# Patient Record
Sex: MALE | Race: WHITE | NOT HISPANIC OR LATINO | Employment: FULL TIME | URBAN - METROPOLITAN AREA
[De-identification: names, ages, dates, MRNs, and addresses within clinical notes are randomized per-mention and may not be internally consistent; named-entity substitution may affect disease eponyms.]

---

## 2017-01-11 ENCOUNTER — OFFICE VISIT (OUTPATIENT)
Dept: FAMILY MEDICINE | Facility: CLINIC | Age: 57
End: 2017-01-11
Payer: COMMERCIAL

## 2017-01-11 VITALS
HEIGHT: 72 IN | SYSTOLIC BLOOD PRESSURE: 141 MMHG | HEART RATE: 100 BPM | TEMPERATURE: 98.2 F | BODY MASS INDEX: 31.56 KG/M2 | OXYGEN SATURATION: 95 % | DIASTOLIC BLOOD PRESSURE: 86 MMHG | WEIGHT: 233 LBS

## 2017-01-11 DIAGNOSIS — I10 HYPERTENSION GOAL BP (BLOOD PRESSURE) < 140/90: ICD-10-CM

## 2017-01-11 DIAGNOSIS — E66.811 OBESITY (BMI 30.0-34.9): ICD-10-CM

## 2017-01-11 DIAGNOSIS — J20.9 ACUTE BRONCHITIS, UNSPECIFIED ORGANISM: Primary | ICD-10-CM

## 2017-01-11 DIAGNOSIS — E11.9 TYPE 2 DIABETES MELLITUS WITHOUT COMPLICATION, WITHOUT LONG-TERM CURRENT USE OF INSULIN (H): ICD-10-CM

## 2017-01-11 DIAGNOSIS — M79.672 LEFT FOOT PAIN: ICD-10-CM

## 2017-01-11 PROCEDURE — 99214 OFFICE O/P EST MOD 30 MIN: CPT | Performed by: FAMILY MEDICINE

## 2017-01-11 RX ORDER — DOXYCYCLINE 100 MG/1
100 CAPSULE ORAL 2 TIMES DAILY
Qty: 20 CAPSULE | Refills: 0 | Status: SHIPPED | OUTPATIENT
Start: 2017-01-11 | End: 2017-04-14

## 2017-01-11 ASSESSMENT — PAIN SCALES - GENERAL: PAINLEVEL: EXTREME PAIN (8)

## 2017-01-11 NOTE — MR AVS SNAPSHOT
After Visit Summary   1/11/2017    Zachary Chand    MRN: [id number]           Patient Information     Date Of Birth          1960        Visit Information        Provider Department      1/11/2017 12:20 PM Terry Emanuel MD [facility]        Today's Diagnoses     Acute bronchitis, unspecified organism    -  1     Left foot pain            Follow-ups after your visit        Additional Services     PODIATRY/FOOT & ANKLE SURGERY REFERRAL       Your provider has referred you to: [facility]: [facility] [phone]   [email or web address]/    Please be aware that coverage of these services is subject to the terms and limitations of your health insurance plan.  Call member services at your health plan with any benefit or coverage questions.      Please bring the following to your appointment:  >>   Any x-rays, CTs or MRIs which have been performed.  Contact the facility where they were done to arrange for  prior to your scheduled appointment.    >>   List of current medications   >>   This referral request   >>   Any documents/labs given to you for this referral                  Follow-up notes from your care team     Return if symptoms worsen or fail to improve.      Who to contact     If you have questions or need follow up information about today's clinic visit or your schedule please contact [facility] directly at [phone].  Normal or non-critical lab and imaging results will be communicated to you by MyChart, letter or phone within 4 business days after the clinic has received the results. If you do not hear from us within 7 days, please contact the clinic through MyChart or phone. If you have a critical or abnormal lab result, we will notify you by phone as soon as possible.  Submit refill requests through [employer] or call your pharmacy and they will forward the refill request to  "us. Please allow 3 business days for your refill to be completed.          Additional Information About Your Visit        [email or web address] Information     [employer] lets you send messages to your doctor, view your test results, renew your prescriptions, schedule appointments and more. To sign up, go to [email or web address] . Click on \"Log in\" on the left side of the screen, which will take you to the Welcome page. Then click on \"Sign up Now\" on the right side of the page.     You will be asked to enter the access code listed below, as well as some personal information. Please follow the directions to create your username and password.     Your access code is: [email or web address]  Expires: 2017 10:34 AM     Your access code will  in 90 days. If you need help or a new code, please call your [place] clinic or [phone].        Care EveryWhere ID     This is your [facility] EveryWhere ID. This could be used by other organizations to access your [place] medical records  [id number]        Your Vitals Were     Pulse Temperature Height BMI (Body Mass Index) Pulse Oximetry       100 98.2  F (36.8  C) (Oral) 5' 11.75\" (1.822 m) 31.84 kg/m2 95%        Blood Pressure from Last 3 Encounters:   17 147/84   16 152/97   16 144/88    Weight from Last 3 Encounters:   17 233 lb (105.688 kg)   16 232 lb 3.2 oz (105.325 kg)   16 235 lb (106.595 kg)              We Performed the Following     PODIATRY/FOOT & ANKLE SURGERY REFERRAL          Today's Medication Changes          These changes are accurate as of: 17  1:04 PM.  If you have any questions, ask your nurse or doctor.               Start taking these medicines.        Dose/Directions    doxycycline 100 MG capsule   Commonly known as:  VIBRAMYCIN   Used for:  Acute bronchitis, unspecified organism   Started by:  Terry Emanuel MD        Dose:  100 mg   Take 1 capsule (100 mg) by mouth 2 times daily   Quantity:  20 capsule   Refills:  0       "      Where to get your medicines      These medications were sent to [facility] - [place], [place] - [address]  [address], [facility] [address]     Phone:  [phone]    - doxycycline 100 MG capsule             Primary Care Provider Office Phone # Fax #    Fernando Sutton [phone]       [facility] [address]  [facility] [address]        Thank you!     Thank you for choosing [facility]  for your care. Our goal is always to provide you with excellent care. Hearing back from our patients is one way we can continue to improve our services. Please take a few minutes to complete the written survey that you may receive in the mail after your visit with us. Thank you!             Your Updated Medication List - Protect others around you: Learn how to safely use, store and throw away your medicines at www.disposemymeds.org.          This list is accurate as of: 1/11/17  1:04 PM.  Always use your most recent med list.                   Brand Name Dispense Instructions for use    ACE NOT PRESCRIBED (INTENTIONAL)     0 each    1 each continuous prn. ACE Inhibitor not prescribed due to Other:questionable SE. Will try again after DM stable.       aspirin 325 MG buffered tablet      Take  by mouth daily.       blood glucose monitoring meter device kit    no brand specified    1 kit    Use to test blood sugar 1 times daily or as directed.  Also refills on strips and lancets, refills for a year       doxycycline 100 MG capsule    VIBRAMYCIN    20 capsule    Take 1 capsule (100 mg) by mouth 2 times daily       EPINEPHrine 0.3 MG/0.3ML injection    EPIPEN 2-FAITH    1 each    Inject 0.3 mLs (0.3 mg) into the muscle once as needed for anaphylaxis       gabapentin 100 MG capsule    NEURONTIN    90 capsule    Take 1 capsule (100 mg) by mouth 3 times daily       glipiZIDE 5 MG tablet    GLUCOTROL    180 tablet     Take 1 tablet (5 mg) by mouth 2 times daily (before meals)       ibuprofen 200 MG tablet    ADVIL/MOTRIN     Take 200 mg by mouth every 4 hours as needed. As needed       indomethacin 50 MG capsule    INDOCIN    30 capsule    Take 1 capsule (50 mg) by mouth 2 times daily (with meals)       lisinopril 10 MG tablet    PRINIVIL/ZESTRIL    30 tablet    Take 1 tablet (10 mg) by mouth daily       metFORMIN 1000 MG tablet    GLUCOPHAGE    180 tablet    Take 1 tablet (1,000 mg) by mouth 2 times daily (with meals)       nicotine 21 MG/24HR 24 hr patch    NICODERM CQ    30 patch    Place 1 patch onto the skin every 24 hours       sildenafil 100 MG cap/tab    VIAGRA    6 tablet    Take 1 tablet (100 mg) by mouth daily as needed for erectile dysfunction

## 2017-01-11 NOTE — NURSING NOTE
"Chief Complaint   Patient presents with     URI     x 4 weeks, cough, fever for 4 days     Ankle/Foot left     Was hunting in November and moving a tree with a tree and strap. Chunk of wood hit the side of his foot. Feels like he is walking on a roll of dimes on the instep of his foot. Was seen on 11/18/16     Health Maintenance     Flu shot declined, FIT       Initial /84 mmHg  Pulse 100  Temp(Src) 98.2  F (36.8  C) (Oral)  Ht 5' 11.75\" (1.822 m)  Wt 233 lb (105.688 kg)  BMI 31.84 kg/m2  SpO2 95% Estimated body mass index is 31.84 kg/(m^2) as calculated from the following:    Height as of this encounter: 5' 11.75\" (1.822 m).    Weight as of this encounter: 233 lb (105.688 kg).  BP completed using cuff size: large right arm  Suzy Beebe CMA       "

## 2017-01-11 NOTE — PROGRESS NOTES
SUBJECTIVE:                                                    Zachary Chand is a 56 year old male who presents to clinic today for the following health issues:      Acute Illness   Acute illness concerns: cough, fever, chest congestion. Was in bed for 3-4 days  Onset: 4 weeks     Fever: no    Chills/Sweats: YES- Had it    Headache (location?): no    Sinus Pressure:no    Conjunctivitis:  no    Ear Pain: no    Rhinorrhea: YES- Little bit    Congestion: YES    Sore Throat: no     Cough: YES-productive of green sputum    Wheeze: no    Decreased Appetite: YES- Little bit    Nausea: no    Vomiting: no    Diarrhea:  no    Dysuria/Freq.: no    Fatigue/Achiness: YES    Sick/Strep Exposure: YES- girlfriend     Therapies Tried and outcome: OTC cold medicines    He is a smoker. He smokes about 1 pack per day.    Left foot      Duration: 2 months    Description (location/character/radiation): Left foot, Was hunting in November and moving a tree with a truck and strap. Chunk of wood hit the side of his foot. Feels like he is walking on a roll of dimes on the instep of his foot. Hurts to walk. Was seen on 11/18/16    Intensity:  severe    Accompanying signs and symptoms:     History (similar episodes/previous evaluation): Yes    Precipitating or alleviating factors: None    Therapies tried and outcome: None       He had an x-ray done in mid November which is negative for any fracture. He's continued to have ongoing discomfort, primarily in the mid arch of his foot.    He does have underlying diabetes. He is on baseline medications as below. He is not taking anything specifically for blood pressure now.    Problem list and histories reviewed & adjusted, as indicated.  Additional history: as documented    Patient Active Problem List   Diagnosis     Chronic daily headache     Venous stasis ulcers (H)     Type 2 diabetes, HbA1c goal < 7% (H)     Hyperlipidemia LDL goal <100     Hypertension goal BP (blood pressure) < 140/90      Venous insufficiency     Bee sting allergy     Contusion of bone     Varicose veins     Tobacco abuse     Past Surgical History   Procedure Laterality Date     Hernia repair  1997     bilateral groin     Orthopedic surgery  1977     right shoulder dislocation       Social History   Substance Use Topics     Smoking status: Current Every Day Smoker -- 1.00 packs/day     Types: Cigarettes     Smokeless tobacco: Never Used     Alcohol Use: Yes      Comment: 1-5 daily     Family History   Problem Relation Age of Onset     Alcohol/Drug Father          Current Outpatient Prescriptions   Medication Sig Dispense Refill            metFORMIN (GLUCOPHAGE) 1000 MG tablet Take 1 tablet (1,000 mg) by mouth 2 times daily (with meals) 180 tablet 1     glipiZIDE (GLUCOTROL) 5 MG tablet Take 1 tablet (5 mg) by mouth 2 times daily (before meals) 180 tablet 1     blood glucose monitoring (NO BRAND SPECIFIED) meter device kit Use to test blood sugar 1 times daily or as directed.    Also refills on strips and lancets, refills for a year 1 kit 11     EPINEPHrine (EPIPEN 2-FAITH) 0.3 MG/0.3ML injection Inject 0.3 mLs (0.3 mg) into the muscle once as needed for anaphylaxis 1 each 1     ACE NOT PRESCRIBED, INTENTIONAL, 1 each continuous prn. ACE Inhibitor not prescribed due to Other:questionable SE. Will try again after DM stable. 0 each 0     ibuprofen (ADVIL,MOTRIN) 200 MG tablet Take 200 mg by mouth every 4 hours as needed. As needed       indomethacin (INDOCIN) 50 MG capsule Take 1 capsule (50 mg) by mouth 2 times daily (with meals) 30 capsule 0     lisinopril (PRINIVIL,ZESTRIL) 10 MG tablet Take 1 tablet (10 mg) by mouth daily 30 tablet 2     gabapentin (NEURONTIN) 100 MG capsule Take 1 capsule (100 mg) by mouth 3 times daily 90 capsule 2     nicotine (NICODERM CQ) 21 MG/24HR patch 2h hr Place 1 patch onto the skin every 24 hours 30 patch 2     sildenafil (VIAGRA) 100 MG tablet Take 1 tablet (100 mg) by mouth daily as needed for erectile  "dysfunction 6 tablet 12     aspirin 325 MG buffered tablet Take  by mouth daily.       Allergies   Allergen Reactions     Bee Pollen      Swelling      No Known Drug Allergies        ROS:  Noncontributory except as above    OBJECTIVE:                                                    /86 mmHg  Pulse 100  Temp(Src) 98.2  F (36.8  C) (Oral)  Ht 5' 11.75\" (1.822 m)  Wt 233 lb (105.688 kg)  BMI 31.84 kg/m2  SpO2 95%  Body mass index is 31.84 kg/(m^2).  GENERAL: alert, no distress and over weight  EYES: Eyes grossly normal to inspection, PERRL and conjunctivae and sclerae normal  HENT: ear canals and TM's normal, nose and mouth without ulcers or lesions  NECK: no adenopathy, no asymmetry, masses, or scars and thyroid normal to palpation  RESP: lungs clear to auscultation - no rales, rhonchi or wheezes  MS: he has mild discomfort to palpation of the left mid foot on the plantar aspect. No swelling. No gross deformity. Minimal discomfort over the lateral left foot.    Diagnostic Test Results:  none      ASSESSMENT/PLAN:                                                        ICD-10-CM    1. Acute bronchitis, unspecified organism J20.9 doxycycline (VIBRAMYCIN) 100 MG capsule   2. Left foot pain M79.672 PODIATRY/FOOT & ANKLE SURGERY REFERRAL   3. Hypertension goal BP (blood pressure) < 140/90 I10    4. Type 2 diabetes mellitus without complication, without long-term current use of insulin (H) E11.9    5. Obesity (BMI 30.0-34.9) E66.9      Given his persistent cough and other URI symptoms in the setting of underlying smoking and diabetes, we'll treat him with doxycycline as above ( he did not want to use azithromycin )  I recommended conservative care for his left foot symptoms including an over-the-counter orthotic/insert  I offered him consultation with podiatry and he may pursue that if desired  His blood pressure is above goal and I encouraged him to go back on lisinopril, but to follow-up with his PCP at some " point on this  I also encouraged smoking cessation  I encouraged diet and exercise treatment for weight loss    If new, worsening or persistent symptoms, the patient is to call or return for a recheck.      Terry Emanuel MD  [facility]

## 2017-01-20 ENCOUNTER — TELEPHONE (OUTPATIENT)
Dept: FAMILY MEDICINE | Facility: CLINIC | Age: 57
End: 2017-01-20

## 2017-01-20 NOTE — TELEPHONE ENCOUNTER
1/20/2017    Call Regarding Preventive Health Screening Colonoscopy    Attempt 1    Message: VOICE MAIL NOT SET UP    Comments:       Outreach   CC

## 2017-02-06 PROBLEM — E66.9 OBESITY: Status: ACTIVE | Noted: 2017-02-06

## 2017-02-22 ENCOUNTER — TELEPHONE (OUTPATIENT)
Dept: FAMILY MEDICINE | Facility: CLINIC | Age: 57
End: 2017-02-22

## 2017-02-22 DIAGNOSIS — Z12.11 SPECIAL SCREENING FOR MALIGNANT NEOPLASMS, COLON: Primary | ICD-10-CM

## 2017-02-22 NOTE — TELEPHONE ENCOUNTER
Panel Management Review      Patient has the following on his problem list:     Diabetes    ASA: Passed    Last A1C  Lab Results   Component Value Date    A1C 7.9 12/30/2016    A1C 8.4 11/18/2016    A1C 10.7 12/14/2015    A1C 7.2 04/09/2013    A1C 7.5 08/02/2012     A1C tested: Passed    Last LDL:    Lab Results   Component Value Date    CHOL 198 12/30/2016     Lab Results   Component Value Date    HDL 29 12/30/2016     Lab Results   Component Value Date     12/30/2016     Lab Results   Component Value Date    TRIG 137 12/30/2016     Lab Results   Component Value Date    CHOLHDLRATIO 6.0 07/20/2012     Lab Results   Component Value Date    NHDL 169 12/30/2016       Is the patient on a Statin? NO             Is the patient on Aspirin? YES    Medications     Salicylates    aspirin 325 MG buffered tablet          Last three blood pressure readings:  BP Readings from Last 3 Encounters:   01/11/17 141/86   11/18/16 (!) 152/97   04/01/16 144/88       Date of last diabetes office visit: 04/2016 patient will follow up on April.     Tobacco History:     History   Smoking Status     Current Every Day Smoker     Packs/day: 1.00     Types: Cigarettes   Smokeless Tobacco     Never Used           Composite cancer screening  Chart review shows that this patient is due/due soon for the following Fecal Colorectal (FIT)  Summary:    Patient is due/failing the following:   FIT    Action needed:   Patient needs referral/order: fit test pended    Type of outreach:    Phone, spoke to patient.  he is willing to complete a fit test will have lab mail to him    Questions for provider review:    Will have Dr. Sutton sign order                                                                                                                                    Sandhya Hernandez [facility]       Chart routed to Provider .

## 2017-04-14 ENCOUNTER — OFFICE VISIT (OUTPATIENT)
Dept: INTERNAL MEDICINE | Facility: CLINIC | Age: 57
End: 2017-04-14
Payer: COMMERCIAL

## 2017-04-14 ENCOUNTER — TELEPHONE (OUTPATIENT)
Dept: FAMILY MEDICINE | Facility: CLINIC | Age: 57
End: 2017-04-14

## 2017-04-14 ENCOUNTER — RADIANT APPOINTMENT (OUTPATIENT)
Dept: ULTRASOUND IMAGING | Facility: CLINIC | Age: 57
End: 2017-04-14
Attending: NURSE PRACTITIONER
Payer: COMMERCIAL

## 2017-04-14 ENCOUNTER — TELEPHONE (OUTPATIENT)
Dept: NURSING | Facility: CLINIC | Age: 57
End: 2017-04-14

## 2017-04-14 VITALS
WEIGHT: 237 LBS | DIASTOLIC BLOOD PRESSURE: 72 MMHG | HEART RATE: 108 BPM | BODY MASS INDEX: 32.37 KG/M2 | TEMPERATURE: 98.5 F | OXYGEN SATURATION: 98 % | SYSTOLIC BLOOD PRESSURE: 138 MMHG

## 2017-04-14 DIAGNOSIS — M79.605 PAIN OF LEFT LOWER EXTREMITY: ICD-10-CM

## 2017-04-14 DIAGNOSIS — Z72.0 TOBACCO ABUSE: ICD-10-CM

## 2017-04-14 DIAGNOSIS — E11.9 TYPE 2 DIABETES MELLITUS WITHOUT COMPLICATION, WITHOUT LONG-TERM CURRENT USE OF INSULIN (H): ICD-10-CM

## 2017-04-14 DIAGNOSIS — M79.605 PAIN OF LEFT LOWER EXTREMITY: Primary | ICD-10-CM

## 2017-04-14 DIAGNOSIS — I10 HYPERTENSION GOAL BP (BLOOD PRESSURE) < 140/90: ICD-10-CM

## 2017-04-14 PROCEDURE — 93971 EXTREMITY STUDY: CPT | Mod: LT

## 2017-04-14 PROCEDURE — 99213 OFFICE O/P EST LOW 20 MIN: CPT | Performed by: NURSE PRACTITIONER

## 2017-04-14 ASSESSMENT — PAIN SCALES - GENERAL: PAINLEVEL: MODERATE PAIN (4)

## 2017-04-14 NOTE — TELEPHONE ENCOUNTER
"Call Type: Triage Call    Presenting Problem: \"I have a blood clot in my leg\". It was set to  strip it out. Insurance ran out then so didn't have it done. Other  leg is starting to hurt between knee and groin on the inner side.  Should get looked at.  Triage Note:  Guideline Title: Leg Non-Injury  Recommended Disposition: See Provider within 72 Hours  Original Inclination:  Override Disposition:  Intended Action:  Physician Contacted: No  Open area on soft tissue or over any pressure point not previously evaluated  WITHOUT signs of infection. ?  YES  Orthopedic hardware (metal plate, nahed or screw) newly bulging under or through  skin ? NO  Gradual onset (weeks to months) of episodes of pain shooting down any extremity ?  NO  Gradual onset or worsening weakness/paralysis OR inability to purposely move ? NO  Gradual onset or worsening numbness/tingling ? NO  Generalized muscle pain or aching ? NO  New marked swelling (twice the normal size as compared to usual appearance) ? NO  New swelling of legs that does NOT resolve with rest and elevation of legs ? NO  Painful spasms or cramping of large muscle groups (back, legs or abdomen) AND  recent heat exposure ? NO  New onset of severe pain AND change in sensation (numb, tingling, or no feeling),  change in color (pale or blue), feels cool to touch compared to other extremity.  ? NO  Sensations of numbness, tingling, extreme sensitivity, shooting or burning  discomfort that occurs with movement and stops with rest AND not previously  evaluated ? NO  Soft, balloon-like swelling behind knee that may or may not be painful AND has not  been previously evaluated ? NO  Open area on soft tissue or over any pressure point that has not improved with 2  weeks of treatment OR is getting larger, has changes in surrounding skin color,  or becoming painful. ? NO  New onset of inability to take unassisted weight-bearing steps ? NO  Sudden onset of shortness of breath, chest pain and " cough with blood tinged sputum  ? NO  New, painful cord-like swelling in calf or thigh of the leg; cord-like swelling  may feel warm or look red or discolored. ? NO  New or worsening one-sided leg swelling with pain that may be described as achy;  pain may worsen with standing or walking. ? NO  New onset of unbearable pain within last 24 hours ? NO  New onset mild to moderate pain that has not improved with 48 hours of medical  treatment or home care ? NO  Extremity swelling or limitation of range of motion AND known bleeding disorder OR  taking blood thinner, chemotherapy or transplant medications ? NO  Any new OR worsening signs and symptoms of soft tissue infection ? NO  Any signs and symptoms of soft tissue infection that have not improved with 48  hours of medical care ? NO  New unexplained weakness/paralysis, change in sensation (numbness or tingling) or  inability to purposely move, especially when one side of body is involved  occurring now or within last 4 hours ? NO  Physician Instructions:  Care Advice: SYMPTOM / CONDITION MANAGEMENT  See a provider within 4 hours if having new signs or symptoms of soft  tissue infection (such as redness, warmth, tenderness, swelling  may have drainage).

## 2017-04-14 NOTE — PROGRESS NOTES
"  SUBJECTIVE:                                                    Zachary Chand is a 56 year old male who presents to clinic today for the following health issues:      Joint Pain     Onset: 2 weeks    Description:   Location: Upper left leg  Character: Dull ace and throb    Intensity: mild    Progression of Symptoms: worse    Accompanying Signs & Symptoms:  Other symptoms: none   History:   Previous similar pain: YES- other leg, had a blood clot about 3 years ago      Precipitating factors:   Trauma or overuse: no     Alleviating factors:  Improved by: nothing       Therapies Tried and outcome:     Denies injury or trauma  Left medial thigh pain  Hx superficial thrombophlebitis right greater saphenous vein in 2013  States that this feels similar  No redness, warmth or swelling  Symptoms not worsened with exertion  Feels throbbing sensation when leg in certain positions    He is not taking an aspirin  Stopped taking lisinopril  \"I don't like to be on a lot of pills\"  Reports compliance with diabetes meds  A1c 7.9% 12/30/16      Problem list and histories reviewed & adjusted, as indicated.  Additional history: none    Patient Active Problem List   Diagnosis     Chronic daily headache     Venous stasis ulcers (H)     Type 2 diabetes, HbA1c goal < 7% (H)     Hyperlipidemia LDL goal <100     Hypertension goal BP (blood pressure) < 140/90     Venous insufficiency     Bee sting allergy     Contusion of bone     Varicose veins     Tobacco abuse     Obesity     Past Surgical History:   Procedure Laterality Date     HERNIA REPAIR  1997    bilateral groin     ORTHOPEDIC SURGERY  1977    right shoulder dislocation       Social History   Substance Use Topics     Smoking status: Current Every Day Smoker     Packs/day: 1.00     Types: Cigarettes     Smokeless tobacco: Never Used     Alcohol use Yes      Comment: 1-5 daily     Family History   Problem Relation Age of Onset     Alcohol/Drug Father          BP Readings from Last 3 " "Encounters:   04/14/17 138/72   01/11/17 141/86   11/18/16 (!) 152/97    Wt Readings from Last 3 Encounters:   04/14/17 237 lb (107.5 kg)   01/11/17 233 lb (105.7 kg)   11/18/16 232 lb 3.2 oz (105.3 kg)                  Labs reviewed in EPIC    Reviewed and updated as needed this visit by clinical staff  Tobacco  Allergies  Meds  Med Hx  Surg Hx  Fam Hx  Soc Hx      Reviewed and updated as needed this visit by Provider         ROS:  Constitutional, HEENT, cardiovascular, pulmonary, gi and gu systems are negative, except as otherwise noted.    OBJECTIVE:                                                    /72  Pulse 108  Temp 98.5  F (36.9  C) (Oral)  Wt 237 lb (107.5 kg)  SpO2 98%  BMI 32.37 kg/m2  Body mass index is 32.37 kg/(m^2).  GENERAL: healthy, alert and no distress  RESP: lungs clear to auscultation - no rales, rhonchi or wheezes  CV: regular rate and rhythm, normal S1 S2, no S3 or S4, no murmur, click or rub, no peripheral edema and peripheral pulses strong  Pain along proximal left greater saphenous vein, so swelling, erythema, or warmth  MS: no gross musculoskeletal defects noted, no edema  SKIN: no suspicious lesions or rashes  NEURO: Normal strength and tone, mentation intact and speech normal    Diagnostic Test Results:  none      ASSESSMENT/PLAN:                                                        ICD-10-CM    1. Pain of left lower extremity M79.605 US Lower Extremity Venous Duplex Left   2. Type 2 diabetes mellitus without complication, without long-term current use of insulin (H) E11.9    3. Tobacco abuse Z72.0    4. Hypertension goal BP (blood pressure) < 140/90 I10        Does not have typical S/S of DVT or thrombophlebitis but patient at higher risk with hx superficial thrombophlebitis, poorly controlled diabetes, HTN and tobacco abuse  He also states it \"feels\" like when he had thrombophlebitis in the past. Therefore, I will get US. He will be scheduled for this today.  I " advised that he should be taking aspirin daily. Reviewed risks and benefits, especially with diabetes  BP improved upon recheck. He would like to reduce BP through lifestyle changes. With improvement in BP, I did agree to this. Continue to monitor  A1c improved but above goal. He is due for follow up. I advised him to follow up with his primary care provider     BALDOMERO Herrera [facility]

## 2017-04-14 NOTE — TELEPHONE ENCOUNTER
"I see FNA triage note.  I called patient to clarify, he states he had US for superficial clot to right leg and was told was not an emergency, his insurance ran out and he never followed up with vascular.  He reports his right leg \"is a mess\" but denies changes in the past few years.  He is diabetic.  He states the left upper inner thigh has been moderately painful/achey for 2 days, denies recent history of travel, hospitalization or immobilization.   Denies chest pain or shortness of breath.  Denies redness, warm, or swelling to left leg.  States he has neuropathy but no acute change to sensation of left foot.  Is able to walk.  Advised he be seen to evaluate plan and need for US.  Scheduled with Sara Hernandes as PCP is already overbooked.  Patient verbalized understanding of and agreement with plan.  Source:  Telephone Triage Protocols for Nurses, Maldonado, 5th ed, leg pain/swelling  Gabi Lee, RN  [facility]    "

## 2017-04-14 NOTE — MR AVS SNAPSHOT
After Visit Summary   4/14/2017    Zachary Chand    MRN: [id number]           Patient Information     Date Of Birth          1960        Visit Information        Provider Department      4/14/2017 1:20 PM Sara Hernandes APRN CNP [facility]        Today's Diagnoses     Pain of left lower extremity    -  1    Type 2 diabetes mellitus without complication, without long-term current use of insulin (H)        Tobacco abuse        Hypertension goal BP (blood pressure) < 140/90           Follow-ups after your visit        Your next 10 appointments already scheduled     Apr 14, 2017  2:40 PM CDT   US LOWER EXTREMITY VENOUS DUPLEX LEFT with [id number]   [facility]    [address] [place] [address] [phone]           Please bring a list of your medicines (including vitamins, minerals and over-the-counter drugs). Also, tell your doctor about any allergies you may have. Wear comfortable clothes and leave your valuables at home.  You do not need to do anything special to prepare for your exam.  Please call the Imaging Department at your exam site with any questions.              Future tests that were ordered for you today     Open Future Orders        Priority Expected Expires Ordered    US Lower Extremity Venous Duplex Left Routine  4/14/2018 4/14/2017            Who to contact     If you have questions or need follow up information about today's clinic visit or your schedule please contact [facility] directly at [phone].  Normal or non-critical lab and imaging results will be communicated to you by MyChart, letter or phone within 4 business days after the clinic has received the results. If you do not hear from us within 7 days, please contact the clinic through MyChart or phone. If you have a critical or abnormal lab result, we will notify you by phone as soon as possible.  Submit refill  "requests through Dovo or call your pharmacy and they will forward the refill request to us. Please allow 3 business days for your refill to be completed.          Additional Information About Your Visit        Vertive ([email or web address])hart Information     Dovo lets you send messages to your doctor, view your test results, renew your prescriptions, schedule appointments and more. To sign up, go to [email or web address] . Click on \"Log in\" on the left side of the screen, which will take you to the Welcome page. Then click on \"Sign up Now\" on the right side of the page.     You will be asked to enter the access code listed below, as well as some personal information. Please follow the directions to create your username and password.     Your access code is: [email or web address]  Expires: 2017  2:04 PM     Your access code will  in 90 days. If you need help or a new code, please call your [place] clinic or [phone].        Care EveryWhere ID     This is your Care EveryWhere ID. This could be used by other organizations to access your [place] medical records  [id number]        Your Vitals Were     Pulse Temperature Pulse Oximetry BMI (Body Mass Index)          108 98.5  F (36.9  C) (Oral) 98% 32.37 kg/m2         Blood Pressure from Last 3 Encounters:   17 138/72   17 141/86   16 (!) 152/97    Weight from Last 3 Encounters:   17 237 lb (107.5 kg)   17 233 lb (105.7 kg)   16 232 lb 3.2 oz (105.3 kg)               Primary Care Provider Office Phone # Fax #    Fernando Sutton [phone]       [facility] [address] [facility] [address]        Thank you!     Thank you for choosing [facility]  for your care. Our goal is always to provide you with excellent care. Hearing back from our patients is one way we can continue to improve our services. Please take a few minutes to complete the written survey that you may receive in " the mail after your visit with us. Thank you!             Your Updated Medication List - Protect others around you: Learn how to safely use, store and throw away your medicines at www.disposemymeds.org.          This list is accurate as of: 4/14/17  2:09 PM.  Always use your most recent med list.                   Brand Name Dispense Instructions for use    ACE NOT PRESCRIBED (INTENTIONAL)     0 each    1 each continuous prn. ACE Inhibitor not prescribed due to Other:questionable SE. Will try again after DM stable.       aspirin 325 MG buffered tablet      Take  by mouth daily.       blood glucose monitoring meter device kit    no brand specified    1 kit    Use to test blood sugar 1 times daily or as directed.  Also refills on strips and lancets, refills for a year       EPINEPHrine 0.3 MG/0.3ML injection    EPIPEN 2-FAITH    1 each    Inject 0.3 mLs (0.3 mg) into the muscle once as needed for anaphylaxis       glipiZIDE 5 MG tablet    GLUCOTROL    180 tablet    Take 1 tablet (5 mg) by mouth 2 times daily (before meals)       ibuprofen 200 MG tablet    ADVIL/MOTRIN     Take 200 mg by mouth every 4 hours as needed Reported on 4/14/2017       metFORMIN 1000 MG tablet    GLUCOPHAGE    180 tablet    Take 1 tablet (1,000 mg) by mouth 2 times daily (with meals)       nicotine 21 MG/24HR 24 hr patch    NICODERM CQ    30 patch    Place 1 patch onto the skin every 24 hours       sildenafil 100 MG cap/tab    VIAGRA    6 tablet    Take 1 tablet (100 mg) by mouth daily as needed for erectile dysfunction

## 2017-04-14 NOTE — NURSING NOTE
"Chief Complaint   Patient presents with     Pain     Left Leg pain       Initial /84 (BP Location: Right arm, Patient Position: Chair, Cuff Size: Adult Large)  Pulse 108  Temp 98.5  F (36.9  C) (Oral)  Wt 237 lb (107.5 kg)  SpO2 98%  BMI 32.37 kg/m2 Estimated body mass index is 32.37 kg/(m^2) as calculated from the following:    Height as of 1/11/17: 5' 11.75\" (1.822 m).    Weight as of this encounter: 237 lb (107.5 kg).  Medication Reconciliation: complete.  RUBEN Gallardo MA      "

## 2017-04-14 NOTE — TELEPHONE ENCOUNTER
Reason for Call:  Other appointment    Detailed comments: Patient spoke with a triage nurse about a blood clot in his leg. Triage nurse suggested that he get an appt with a provider to get an ultrasound asap. No appointments were available today, so patient was wondering if he could either be squeezed in, or have an ultrasound ordered without an appt. Please call to advise.    Phone Number Patient can be reached at: Home number on file [phone] (home)    Best Time: anytime    Can we leave a detailed message on this number? YES    Call taken on 4/14/2017 at 8:20 AM by Kaveh Badillo

## 2017-06-05 ENCOUNTER — TELEPHONE (OUTPATIENT)
Dept: FAMILY MEDICINE | Facility: CLINIC | Age: 57
End: 2017-06-05

## 2017-06-05 NOTE — TELEPHONE ENCOUNTER
Panel Management Review      Patient has the following on his problem list:     Diabetes    ASA: Passed    Last A1C  Lab Results   Component Value Date    A1C 7.9 12/30/2016    A1C 8.4 11/18/2016    A1C 10.7 12/14/2015    A1C 7.2 04/09/2013    A1C 7.5 08/02/2012     A1C tested: Passed    Last LDL:    Lab Results   Component Value Date    CHOL 198 12/30/2016     Lab Results   Component Value Date    HDL 29 12/30/2016     Lab Results   Component Value Date     12/30/2016     Lab Results   Component Value Date    TRIG 137 12/30/2016     Lab Results   Component Value Date    CHOLHDLRATIO 6.0 07/20/2012     Lab Results   Component Value Date    NHDL 169 12/30/2016       Is the patient on a Statin? NO             Is the patient on Aspirin? YES    Medications     Salicylates    aspirin 325 MG buffered tablet          Last three blood pressure readings:  BP Readings from Last 3 Encounters:   04/14/17 138/72   01/11/17 141/86   11/18/16 (!) 152/97       Date of last diabetes office visit: 4/2016     Tobacco History:     History   Smoking Status     Current Every Day Smoker     Packs/day: 1.00     Types: Cigarettes   Smokeless Tobacco     Never Used         Hypertension   Last three blood pressure readings:  BP Readings from Last 3 Encounters:   04/14/17 138/72   01/11/17 141/86   11/18/16 (!) 152/97     Blood pressure: Passed    HTN Guidelines:  Age 18-59 BP range:  Less than 140/90  Age 60-85 with Diabetes:  Less than 140/90  Age 60-85 without Diabetes:  less than 150/90      Composite cancer screening  Chart review shows that this patient is due/due soon for the following Fecal Colorectal (FIT)  Summary:    Patient is due/failing the following:   FIT    Action needed:   Patient needs referral/order: FIT test given in February.    Type of outreach:    Sent letter.    Questions for provider review:    None                                                                                                                                     Kalina See NNAMDI Sol     Chart routed to Care Team .

## 2017-06-05 NOTE — LETTER
June 5, 2017    Zachary Chand  [address]  [facility] [address]    Dear Zachary    We care about your health and have reviewed your health plan. We have reviewed your medical conditions, medication list, and lab results and are making recommendations based on this review, to better manage your health.    You are in particular need of attention regarding:  - Completing a Colon Cancer Screening (FIT) - Please complete FIT test that was given to you in February and mail completed test to clinic.      Here is a list of Health Maintenance topics that are due now or due soon:  Health Maintenance Due   Topic Date Due     EYE EXAM Q1 YEAR  [birth year]06/1961     PNEUMOVAX 1X HI RISK PATIENT < 65 (NO IB MSG)  07/06/1963     FIT Q1 YR  07/06/1971     ADVANCE DIRECTIVE PLANNING Q5 YRS  07/06/1979     TOBACCO CESSATION COUNSELING Q1 YR  12/14/2016     A1C Q6 MO  06/30/2017     We will be calling you in the next couple of weeks to help you schedule any appointments that are needed.  Please call us at [phone] (or use AviantLogic) to address the above recommendations.     Thank you for trusting [facility] and we appreciate the opportunity to serve you.  We look forward to supporting your healthcare needs in the future.    Healthy Regards,    Your [facility]

## 2017-07-13 NOTE — TELEPHONE ENCOUNTER
Called patient and left voice message for patient to call back. Final letter sent.  Kalina See NNAMDI Sol

## 2017-08-21 ENCOUNTER — OFFICE VISIT (OUTPATIENT)
Dept: FAMILY MEDICINE | Facility: CLINIC | Age: 57
End: 2017-08-21
Payer: COMMERCIAL

## 2017-08-21 VITALS
OXYGEN SATURATION: 99 % | DIASTOLIC BLOOD PRESSURE: 80 MMHG | HEART RATE: 98 BPM | BODY MASS INDEX: 33.05 KG/M2 | TEMPERATURE: 97.1 F | SYSTOLIC BLOOD PRESSURE: 138 MMHG | WEIGHT: 242 LBS

## 2017-08-21 DIAGNOSIS — E11.65 TYPE 2 DIABETES MELLITUS WITH HYPERGLYCEMIA, WITHOUT LONG-TERM CURRENT USE OF INSULIN (H): ICD-10-CM

## 2017-08-21 DIAGNOSIS — M51.369 DDD (DEGENERATIVE DISC DISEASE), LUMBAR: Primary | ICD-10-CM

## 2017-08-21 PROCEDURE — 99213 OFFICE O/P EST LOW 20 MIN: CPT | Performed by: NURSE PRACTITIONER

## 2017-08-21 RX ORDER — GLIPIZIDE 5 MG/1
5 TABLET ORAL
Qty: 180 TABLET | Refills: 0 | Status: SHIPPED | OUTPATIENT
Start: 2017-08-21 | End: 2018-01-08

## 2017-08-21 RX ORDER — DIAZEPAM 10 MG
10 TABLET ORAL EVERY 6 HOURS PRN
Qty: 1 TABLET | Refills: 0 | Status: SHIPPED | OUTPATIENT
Start: 2017-08-21 | End: 2017-09-01

## 2017-08-21 ASSESSMENT — PAIN SCALES - GENERAL: PAINLEVEL: SEVERE PAIN (6)

## 2017-08-21 NOTE — NURSING NOTE
"Chief Complaint   Patient presents with     Back Pain     Recheck Medication       Initial /80 (BP Location: Right arm, Patient Position: Chair, Cuff Size: Adult Regular)  Pulse 98  Temp 97.1  F (36.2  C) (Oral)  Wt 242 lb (109.8 kg)  SpO2 99%  BMI 33.05 kg/m2 Estimated body mass index is 33.05 kg/(m^2) as calculated from the following:    Height as of 1/11/17: 5' 11.75\" (1.822 m).    Weight as of this encounter: 242 lb (109.8 kg).  Medication Reconciliation: complete     Jeniffer Reyes Gomez, MA      "

## 2017-08-21 NOTE — LETTER
[address] [address]  Phone: [phone]  Fax: [phone]    August 21, 2017        Zachary Chand  [address] [facility] [address]          To whom it may concern:    RE: Zachary hCand    Patient was seen and treated today at our clinic. He is experiencing low back pain that needs further workup. He reports he can not tolerate working until this workup can be done.     Please contact me for questions or concerns.      Sincerely,        BALDOMERO Herrera CNP

## 2017-08-21 NOTE — MR AVS SNAPSHOT
After Visit Summary   8/21/2017    Zachary Chand    MRN: [id number]           Patient Information     Date Of Birth          1960        Visit Information        Provider Department      8/21/2017 3:00 PM Sara Hernandes APRN [facility]        Today's Diagnoses     DDD (degenerative disc disease), lumbar    -  1    Type 2 diabetes mellitus with hyperglycemia, without long-term current use of insulin (H)          Care Instructions    Schedule appointment with orthopedist  Bring previous records with you    Schedule a follow up appointment with Dr. Sutton for your diabetes          Follow-ups after your visit        Additional Services     ORTHO  REFERRAL       [facility] is referring you to the Orthopedic  Services at [facility].       The  Representative will assist you in the coordination of your Orthopedic and Musculoskeletal Care as prescribed by your physician.    The  Representative will call you within 1 business day to help schedule your appointment, or you may contact the  Representative at:    All areas ~ [phone]     Type of Referral : Spine: Lumbar  **Choose Medical Spine Specialist (unless patient was seen by a Medical Spine Specialist within the past 6 months).**  Surgical Evaluation is advised if the patient presents with one or more of the following red flags: Evidence of Spinal Tumor, Infection or Fracture, Cauda Equina Syndrome, Sudden or Progressive Weakness, Loss of Bowel or Bladder Control, or any other documented emergent neurological condition resulting from a Lumbar Spinal Condition. Medical Spine Specialist        Timeframe requested: 3 - 5 days    Coverage of these services is subject to the terms and limitations of your health insurance plan.  Please call member services at your health plan with any benefit or coverage questions.      If X-rays, CT or  "MRI's have been performed, please contact the facility where they were done to arrange for , prior to your scheduled appointment.  Please bring this referral request to your appointment and present it to your specialist.                  Future tests that were ordered for you today     Open Future Orders        Priority Expected Expires Ordered    MR Lumbar Spine w/o Contrast Routine  2018            Who to contact     If you have questions or need follow up information about today's clinic visit or your schedule please contact [facility] directly at [phone].  Normal or non-critical lab and imaging results will be communicated to you by "[employer] ([email or web address])"hart, letter or phone within 4 business days after the clinic has received the results. If you do not hear from us within 7 days, please contact the clinic through E96t or phone. If you have a critical or abnormal lab result, we will notify you by phone as soon as possible.  Submit refill requests through CarWale or call your pharmacy and they will forward the refill request to us. Please allow 3 business days for your refill to be completed.          Additional Information About Your Visit        CarWale Information     [employer]Wale lets you send messages to your doctor, view your test results, renew your prescriptions, schedule appointments and more. To sign up, go to [email or web address] . Click on \"Log in\" on the left side of the screen, which will take you to the Welcome page. Then click on \"Sign up Now\" on the right side of the page.     You will be asked to enter the access code listed below, as well as some personal information. Please follow the directions to create your username and password.     Your access code is: [email or web address]  Expires: 2017  3:52 PM     Your access code will  in 90 days. If you need help or a new code, please call your [facility] or [phone].        Care EveryWhere ID     This is " your Care EveryWhere ID. This could be used by other organizations to access your [place] medical records  [id number]        Your Vitals Were     Pulse Temperature Pulse Oximetry BMI (Body Mass Index)          98 97.1  F (36.2  C) (Oral) 99% 33.05 kg/m2         Blood Pressure from Last 3 Encounters:   08/21/17 138/80   04/14/17 138/72   01/11/17 141/86    Weight from Last 3 Encounters:   08/21/17 242 lb (109.8 kg)   04/14/17 237 lb (107.5 kg)   01/11/17 233 lb (105.7 kg)              We Performed the Following     ORTHO  REFERRAL          Today's Medication Changes          These changes are accurate as of: 8/21/17  3:52 PM.  If you have any questions, ask your nurse or doctor.               Start taking these medicines.        Dose/Directions    blood glucose monitoring test strip   Commonly known as:  no brand specified   Used for:  Type 2 diabetes mellitus with hyperglycemia, without long-term current use of insulin (H)   Started by:  Sara Hernandes APRN CNP        Use to test blood sugars 2  times daily or as directed   Quantity:  100 strip   Refills:  1       diazepam 10 MG tablet   Commonly known as:  VALIUM   Used for:  DDD (degenerative disc disease), lumbar   Started by:  Sara Hernandes APRN CNP        Dose:  10 mg   Take 1 tablet (10 mg) by mouth every 6 hours as needed for anxiety or sleep Take 30-60 minutes before procedure.  Do not operate a vehicle after taking this medication.   Quantity:  1 tablet   Refills:  0            Where to get your medicines      These medications were sent to [facility] [place], [place] - [address]  [address], [facility] [address]     Phone:  [phone]     glipiZIDE 5 MG tablet    metFORMIN 1000 MG tablet         Some of these will need a paper prescription and others can be bought over the counter.  Ask your nurse if you have questions.     Bring a paper prescription for each of these  medications     blood glucose monitoring test strip    diazepam 10 MG tablet                Primary Care Provider Office Phone # Fax #    Fernando Sutton [phone]       [address] [facility] [address]        Equal Access to Services     AQUILINO JUAREZ : Taniya jolanta roche delio Sooneliaali, waaxda luqadaha, qaybta kaalmada adekaelynda, eddie shetty laEnedinaesthela ba. So [facility] [phone].    ATENCIÓN: Si habla español, tiene a quintero disposición servicios gratuitos de asistencia lingüística. Llame al [phone].    We comply with applicable federal civil rights laws and [place] laws. We do not discriminate on the basis of race, color, national origin, age, disability sex, sexual orientation or gender identity.            Thank you!     Thank you for choosing [facility]  for your care. Our goal is always to provide you with excellent care. Hearing back from our patients is one way we can continue to improve our services. Please take a few minutes to complete the written survey that you may receive in the mail after your visit with us. Thank you!             Your Updated Medication List - Protect others around you: Learn how to safely use, store and throw away your medicines at www.disposemymeds.org.          This list is accurate as of: 8/21/17  3:52 PM.  Always use your most recent med list.                   Brand Name Dispense Instructions for use Diagnosis    ACE NOT PRESCRIBED (INTENTIONAL)     0 each    1 each continuous prn. ACE Inhibitor not prescribed due to Other:questionable SE. Will try again after DM stable.    Type 2 diabetes, HbA1c goal < 7% (H)       aspirin 325 MG buffered tablet      Take  by mouth daily.        blood glucose monitoring meter device kit    no brand specified    1 kit    Use to test blood sugar 1 times daily or as directed.  Also refills on strips and lancets, refills for a year    Type 2 diabetes mellitus without  complication (H)       blood glucose monitoring test strip    no brand specified    100 strip    Use to test blood sugars 2  times daily or as directed    Type 2 diabetes mellitus with hyperglycemia, without long-term current use of insulin (H)       diazepam 10 MG tablet    VALIUM    1 tablet    Take 1 tablet (10 mg) by mouth every 6 hours as needed for anxiety or sleep Take 30-60 minutes before procedure.  Do not operate a vehicle after taking this medication.    DDD (degenerative disc disease), lumbar       EPINEPHrine 0.3 MG/0.3ML injection    EPIPEN 2-FAITH    1 each    Inject 0.3 mLs (0.3 mg) into the muscle once as needed for anaphylaxis    Bee sting allergy       glipiZIDE 5 MG tablet    GLUCOTROL    180 tablet    Take 1 tablet (5 mg) by mouth 2 times daily (before meals)    Type 2 diabetes mellitus with hyperglycemia, without long-term current use of insulin (H)       ibuprofen 200 MG tablet    ADVIL/MOTRIN     Take 200 mg by mouth every 4 hours as needed Reported on 4/14/2017        metFORMIN 1000 MG tablet    GLUCOPHAGE    180 tablet    Take 1 tablet (1,000 mg) by mouth 2 times daily (with meals)    Type 2 diabetes mellitus with hyperglycemia, without long-term current use of insulin (H)       nicotine 21 MG/24HR 24 hr patch    NICODERM CQ    30 patch    Place 1 patch onto the skin every 24 hours    Smoker       sildenafil 100 MG cap/tab    VIAGRA    6 tablet    Take 1 tablet (100 mg) by mouth daily as needed for erectile dysfunction    Erectile dysfunction

## 2017-08-21 NOTE — PATIENT INSTRUCTIONS
Schedule appointment with orthopedist  Bring previous records with you    Schedule a follow up appointment with Dr. Sutton for your diabetes

## 2017-08-21 NOTE — PROGRESS NOTES
"  SUBJECTIVE:   Zachary Chand is a 57 year old male who presents to clinic today for the following health issues:      Back Pain Follow Up      Description:   Location of pain:  bilateral  Character of pain: sharp and tightness   Pain radiation: radiates to upper back   Since last visit, pain is:  worsened  New numbness or weakness in legs, not attributed to pain:  no     Intensity: Currently 6/10    History:   Pain interferes with job: YES  Therapies tried without relief: med   Therapies tried with relief: ibuprofen      Accompanying Signs & Symptoms:  Risk of Fracture:  None  Risk of Cauda Equina:  None  Risk of Infection:  None  Risk of Cancer:  None    History of DDD lumbar spine  Now having an acute flare  MRIs in past (reports through [employer], but no records available)  Went fishing this weekend. Afterward, tightening of spine. Difficulty moving afterwards  Does not radiate  Denies paresthesias  Denies B/B incontinence  Was followed at pain clinic in the past  Took 1/2 percocet yesterday (leftover from old prescription) provided some relief  \"Popping sensation\" occurs intermittently past few months  Denies fever      Problem list and histories reviewed & adjusted, as indicated.  Additional history: none    Patient Active Problem List   Diagnosis     Chronic daily headache     Venous stasis ulcers (H)     Type 2 diabetes, HbA1c goal < 7% (H)     Hyperlipidemia LDL goal <100     Hypertension goal BP (blood pressure) < 140/90     Venous insufficiency     Bee sting allergy     Contusion of bone     Varicose veins     Tobacco abuse     Obesity     Past Surgical History:   Procedure Laterality Date     HERNIA REPAIR  1997    bilateral groin     ORTHOPEDIC SURGERY  1977    right shoulder dislocation       Social History   Substance Use Topics     Smoking status: Current Every Day Smoker     Packs/day: 1.00     Types: Cigarettes     Smokeless tobacco: Never Used     Alcohol use Yes      Comment: 1-5 daily     Family " History   Problem Relation Age of Onset     Alcohol/Drug Father              Reviewed and updated as needed this visit by clinical staffTobacco  Med Hx  Surg Hx  Fam Hx  Soc Hx      Reviewed and updated as needed this visit by Provider         ROS:  Constitutional, HEENT, cardiovascular, pulmonary, gi and gu systems are negative, except as otherwise noted.      OBJECTIVE:   /80 (BP Location: Right arm, Patient Position: Chair, Cuff Size: Adult Regular)  Pulse 98  Temp 97.1  F (36.2  C) (Oral)  Wt 242 lb (109.8 kg)  SpO2 99%  BMI 33.05 kg/m2  Body mass index is 33.05 kg/(m^2).  GENERAL: healthy, alert and no distress  RESP: lungs clear to auscultation - no rales, rhonchi or wheezes  CV: regular rate and rhythm, normal S1 S2, no S3 or S4, no murmur, click or rub, no peripheral edema and peripheral pulses strong  MS: No pain to palpation thoracic or lumbar spinous processes. Guarding with ambulation. Pain in bilateral lumbar region. No masses. Reduced ROM d/t pain. Lower extremity strength 5/5 and symmetric  SKIN: no suspicious lesions or rashes  NEURO: Normal strength and tone, mentation intact and speech normal  PSYCH: mentation appears normal, affect normal/bright, poor historian    Diagnostic Test Results:  none     ASSESSMENT/PLAN:       ICD-10-CM    1. DDD (degenerative disc disease), lumbar M51.36 ORTHO  REFERRAL     MR Lumbar Spine w/o Contrast     diazepam (VALIUM) 10 MG tablet   2. Type 2 diabetes mellitus with hyperglycemia, without long-term current use of insulin (H) E11.65 metFORMIN (GLUCOPHAGE) 1000 MG tablet     glipiZIDE (GLUCOTROL) 5 MG tablet     blood glucose monitoring (NO BRAND SPECIFIED) test strip     Patient reports he previously followed at [facility] for chronic back pain, but no imaging or records are available. Perhaps he was seen at another clinic. With acute worsening of pain, I recommend repeating an MRI. He does not know when his last one was, but thinks it's more  than 5 years ago. Advised to schedule ortho appointment after MRI complete and bring previous records to appointment.   No red flag symptoms to warrant emergent imaging or consult  He requests a letter for work. Reports that light duty is not available and his employer will not let him return to work unless he is at full capacity. Letter is written excusing his absence until his conditions improves, or he can be seen by ortho.   He is overdue for follow up of diabetes. Meds refilled x1. He is to schedule f/u with primary care provider. No further refills will be given    Patient Instructions   Schedule appointment with orthopedist  Bring previous records with you    Schedule a follow up appointment with Dr. Sutton for your diabetes      BALDOMERO Herrera [facility]

## 2017-08-24 ENCOUNTER — RADIANT APPOINTMENT (OUTPATIENT)
Dept: MRI IMAGING | Facility: CLINIC | Age: 57
End: 2017-08-24
Attending: NURSE PRACTITIONER
Payer: COMMERCIAL

## 2017-08-24 DIAGNOSIS — M51.369 DDD (DEGENERATIVE DISC DISEASE), LUMBAR: ICD-10-CM

## 2017-08-24 PROCEDURE — 72148 MRI LUMBAR SPINE W/O DYE: CPT | Mod: TC

## 2017-08-24 NOTE — LETTER
[facility]  [address] [place]  [address]  [phone]        August 24, 2017    Zachary Chand  [address]  [facility] [address]        Dear Zachary,    The results of your recent MRI are enclosed.   The MRI shows disc protrusion at L5-S1 and other changes consistent with degenerative disc disease. I see you have an appointment scheduled with Char Fritz CNP on 8/28/17. You can further discuss these findings at that time.     Please call the clinic if you have any concerns.    Results for orders placed or performed in visit on 08/24/17   MR Lumbar Spine w/o Contrast    Narrative    MRI LUMBAR SPINE WITHOUT CONTRAST   8/24/2017 7:54 AM     HISTORY: Two months of low back pain.    COMPARISON: None.    TECHNIQUE: Sagittal T1, T2, and STIR, and transverse proton density  and T2-weighted images were obtained through the lumbar spine.    FINDINGS: Numbering of the levels is based on what appear to be five  lumbar type vertebral bodies. Vertebral body alignment is normal. No  fracture is seen. No pars interarticularis defect is demonstrated. No  osseous lesion is seen. There is a small Schmorl's node in the  superior endplate of the L4 vertebral body with mild adjacent Modic  type II signal change. No other abnormal marrow signal intensity is  identified. The conus medullaris terminates at the level of the L1  vertebral body. No intrathecal abnormality is seen. The adjacent soft  tissues are unremarkable.    Findings by specific level:    T12-L1: The disc and facet joints are normal. No stenosis is seen.    L1-L2: The disc and facet joints are normal. No stenosis is seen.    L2-L3: The disc and facet joints are normal. No stenosis is seen.    L3-L4: There is disc dehydration with mild posterior disc height loss.  There is a prominent diffuse disc bulge with no focal herniation seen.  Moderate right and mild left facet joint degenerative changes are  present.  There is also marked prominence of the ligamentum flava. This  results in moderate to severe central canal stenosis with mild  bilateral neural foraminal stenosis.    L4-L5: There is disc dehydration. The disc height is well preserved.  There is a mild disc bulge with no focal herniation seen. There are  mild degenerative changes in the facet joints. There is mild central  canal stenosis. No neural foraminal narrowing is demonstrated.    L5-S1: There is disc dehydration with mild disc height loss. There is  a mild disc bulge which is somewhat eccentric to the left. This could  be considered to be a small broad-based left central disc protrusion.  This contacts, but does not displace, the left S1 nerve root. No  central canal stenosis is seen. There is mild narrowing of the left  neural foramen. The right neural foramen is widely patent. There are  mild degenerative changes in the facet joints.      Impression    IMPRESSION:   1. At L5-S1 there are degenerative changes and a small left central  disc protrusion which contacts, but does not displace, the left S1  nerve root. There is mild left foraminal stenosis.  2. At L3-L4 degenerative changes result in moderate to severe central  canal and mild bilateral neural foraminal stenosis.  3. At L4-L5 degenerative changes result in mild central canal  stenosis.    ROOSEVELT YAN MD       If you have any questions please call the clinic at [phone].    Sincerely,    Sara ALEXANDRE CNP  LMD

## 2017-08-24 NOTE — PROGRESS NOTES
[facility]  [address] [facility] [address]  Phone: [phone]      08/24/17    Zachary Chand  [facility] [facility] [address]      Dear Zachary,    The results of your recent MRI are enclosed.   The MRI shows disc protrusion at L5-S1 and other changes consistent with degenerative disc disease. I see you have an appointment scheduled with Char Fritz CNP on 8/28/17. You can further discuss these findings at that time.     Please call the clinic if you have any concerns.    Sincerely,    BALDOMERO Herrera CNP    Your [facility] Care Team

## 2017-08-28 ENCOUNTER — OFFICE VISIT (OUTPATIENT)
Dept: NEUROSURGERY | Facility: CLINIC | Age: 57
End: 2017-08-28
Attending: NURSE PRACTITIONER
Payer: COMMERCIAL

## 2017-08-28 ENCOUNTER — TELEPHONE (OUTPATIENT)
Dept: PALLIATIVE MEDICINE | Facility: CLINIC | Age: 57
End: 2017-08-28

## 2017-08-28 VITALS
HEART RATE: 106 BPM | OXYGEN SATURATION: 97 % | TEMPERATURE: 98 F | SYSTOLIC BLOOD PRESSURE: 152 MMHG | HEIGHT: 72 IN | DIASTOLIC BLOOD PRESSURE: 94 MMHG | BODY MASS INDEX: 32.37 KG/M2 | WEIGHT: 239 LBS

## 2017-08-28 DIAGNOSIS — M51.369 DEGENERATIVE DISC DISEASE, LUMBAR: Primary | ICD-10-CM

## 2017-08-28 PROCEDURE — 99203 OFFICE O/P NEW LOW 30 MIN: CPT | Performed by: NURSE PRACTITIONER

## 2017-08-28 PROCEDURE — 99211 OFF/OP EST MAY X REQ PHY/QHP: CPT | Performed by: NURSE PRACTITIONER

## 2017-08-28 NOTE — PATIENT INSTRUCTIONS
Schedule medical branch blocks (someone will contact you)  Please contact the clinic if pain persists at [phone].

## 2017-08-28 NOTE — TELEPHONE ENCOUNTER
Pre-screening questions for Radiology Injections:    Injection to be done at which interventional clinic site? [facility]    Procedure ordered by Dr. Fritz     Procedure ordered? Lumbar Medial Branch Block    What insurance would patient like us to bill for this procedure? BCBS      Worker's comp- Any injection DO NOT SCHEDULE and route to Janie Andersonn.      [employer] insurance - If scheduling an SI joint injection DO NOT SCHEDULE and route to Judy Madera.     [employer]- MBB's must be scheduled at LEAST two weeks apart      [employer] - Any injection besides hip/shoulder/knee joint DO NOT SCHEDULE and route to Judy Madera. She will obtain PA and call pt back to schedule procedure or notify pt of denial.     [employer]- PA required for all MBB's    Is an  needed? No     Patient has a drive home? (mandatory) Yes     Is patient taking any blood thinners (plavix, coumadin, jantoven, warfarin, heparin, pradaxa or dabigatran )? No   (If so, do not schedule, contact RN and/or MD)     Is patient taking any aspirin products? No   (If more than 325mg/day do not schedule; Contact RN/MD. For all non-cervical interventional procedures if patient is taking MORE than 325mg/day, limit aspirin to 81-325mg/day x 1 week. No hold required day of procedure.  For CERVICAL procedures, hold all aspirin products for 6 days.)      Does the patient have a bleeding or clotting disorder? No   (If yes, okay to schedule, but contact RN/MD).  **For any patients with platelet count <100, must be forwarded to provider**    Is patient diabetic? Yes If YES, have them bring their glucometer.    Does patient have an active infection or treated for one within the past week? No    Is patient currently taking any antibiotics?  No  For patients on chronic, preventative, or prophylactic antibiotics, procedures can be scheduled.   For patients on antibiotics for active or recent infection:  Nigel Loredo,  Shraddha Alexander Burton-antibiotic course must have been completed for 4 days  Nigel Garcia-antibiotic course must have been completed for 7 days    Is patient currently taking any steroid medications? (i.e. Prednisone, Medrol)  No   For patients on steroid medications:  Benjamin Franco [place], Burton-steroid course must have been completed for 4 days  Nigel Garcia-steroid course must have been completed for 7 days    Review with patient:  If you are started on any steroids or antibiotics between now and your appointment, you must contact us because it may affect our ability to perform your procedure Yes    Is patient actively being treated for cancer or immunocompromised, including the spleen having been removed? No  **For Dr. Tam patients without spleens should have the chart sent to her**  (If YES, do NOT schedule and route to RN)    Are you able to get on and off an exam table with minimal or no assistance? Yes  (If NO, do NOT schedule and route to RN)  Are you able to roll over and lay on your stomach with minimal or no assistance? Yes  (If NO, do NOT schedule and route to RN)         Any allergies to contrast dye, iodine, shellfish, or numbing and steroid medications? No  (If so, inform nursing and note in scheduling comments.)    Allergies: Bee pollen and No known drug allergies      Any chance of pregnancy? No    Has the patient had a flu shot or any other vaccinations within 7 days before or after the procedure.    No       Does patient have an MRI/CT? MRI  (SI joint, hip injections, lumbar sympathetic blocks, and stellate ganglion blocks do not require an MRI)    If so, was it done at [facility]? Yes      If not, where was it done?      Was the MRI done w/in the last 3 years? Yes     If MRI was not done at [facility], [facility] do NOT schedule. Route to nursing.  (If pt has disc the injection can be scheduled but pt has to bring disc to appt. If they show up w/out disc  the injection cannot be done)      **Must be scheduled with elapsed time interval of at least 2 weeks and not more than 6 months between the First MBB and the Second MBB**       Medial Branch Block Pre-Procedure Instructions    It is okay to take long acting pain medications (if you are on them) the day of the procedure but try not to take any short acting medications unless absolutely necessary. Informed        Long acting meds would include: Gabapentin (Neurontin), MS Contin, Oxycontin        Short acting meds would include:  Percocet, Oxycodone, Vicodin, Ibuprofen     The day of the procedure, you should try to do things that provoke your pain, since the injection is being done to see if it will relieve your pain . Informed    If your pain level is a 4 out of 10 or less on the day of the procedure, please call [phone] to reschedule.  Informed      Reminders (please tell patient if applicable):        Instructed pt to arrive 30 minutes early for IV start if this is for a cervical procedure, ALL sympathetic (stellate ganglion, hypogastric, or lumbar sympathetic block) and all sedation procedures (RFA, spinal cord stimulation trials).         -IVs are not routinely placed for Alexander and Egyhazi cervical cases       If NPO for sedation, it is okay to take medications with sips of water (except if they are to hold blood thinners).    *DO take blood pressure medication if it is prescribed*      If this is for a cervical MBB aspirin needs to be held for 6 days.        Do not schedule procedures requiring IV placement in the first appointment after lunch         For patients 85 or older we recommend having an adult stay w/ them for the remainder of the day.              Does the patient have any questions?

## 2017-08-28 NOTE — MR AVS SNAPSHOT
After Visit Summary   8/28/2017    Zachary Chand    MRN: [id number]           Patient Information     Date Of Birth          1960        Visit Information        Provider Department      8/28/2017 1:50 PM Char Fritz NP [facility]        Today's Diagnoses     Degenerative disc disease, lumbar    -  1      Care Instructions    Schedule medical branch blocks (someone will contact you)  Please contact the clinic if pain persists at [phone].            Follow-ups after your visit        Additional Services     PAIN MANAGEMENT CENTER ([place]) REFERRAL       Your provider has referred you to the [facility].    Reason for Referral: Procedure or injection only - patient will be contacted within 24 hrs to schedule: Diagnostic Medial Branch Block: Lumbar    Please complete the following questions:    What is your diagnosis for the patient's pain? Low back pain, lumbar ddd    Do you have any specific questions for the pain specialist? No    Are there any red flags that may impact the assessment or management of the patient? None    **ANY DIAGNOSTIC TESTS THAT ARE NOT IN EPIC SHOULD BE SENT TO THE PAIN CENTER**    Please note the Pre-Op Pain Consult must be scheduled 2-3 weeks prior to the patient's surgery.  Patient's trying to schedule within 2 weeks of surgery may not be accommodated.     Pre-Op Pain Consults are only good for 30 days.    REGARDING OPIOID MEDICATIONS:  We will always address appropriateness of opioid pain medications, but we generally will not automatically take on a prescribing role. When we do take on prescribing of opioids for chronic pain, it is in collaboration with the referring physician for an intermediate period of time (months), with an expectation that the primary physician or provider will assume the prescribing role if medications are effective at stable doses with demonstrated compliance.  Therefore, please do  "not assume that your prescribing responsibilities end on the day of pain clinic consultation.  Is this agreeable to you? YES    For any questions, contact the [facility] at [phone].    Please be aware that coverage of these services is subject to the terms and limitations of your health insurance plan.  Call member services at your health plan with any benefit or coverage questions.      Please bring the following with you to your appointment:    (1) Any X-Rays, CTs or MRIs which have been performed.  Contact the facility where they were done to arrange for  prior to your scheduled appointment.    (2) List of current medications   (3) This referral request   (4) Any documents/labs given to you for this referral                  Who to contact     If you have questions or need follow up information about today's clinic visit or your schedule please contact [facility] directly at [phone].  Normal or non-critical lab and imaging results will be communicated to you by MyChart, letter or phone within 4 business days after the clinic has received the results. If you do not hear from us within 7 days, please contact the clinic through MyChart or phone. If you have a critical or abnormal lab result, we will notify you by phone as soon as possible.  Submit refill requests through [employer] or call your pharmacy and they will forward the refill request to us. Please allow 3 business days for your refill to be completed.          Additional Information About Your Visit        [employer] Information     [employer] lets you send messages to your doctor, view your test results, renew your prescriptions, schedule appointments and more. To sign up, go to [email or web address] . Click on \"Log in\" on the left side of the screen, which will take you to the Welcome page. Then click on \"Sign up Now\" on the right side of the page.     You will be asked to enter the access " code listed below, as well as some personal information. Please follow the directions to create your username and password.     Your access code is: [email or web address]  Expires: 2017  3:52 PM     Your access code will  in 90 days. If you need help or a new code, please call your [facility] clinic or [phone].        Care EveryWhere ID     This is your Care EveryWhere ID. This could be used by other organizations to access your [facility] medical records  [id number]        Your Vitals Were     Pulse Temperature Height Pulse Oximetry BMI (Body Mass Index)       106 98  F (36.7  C) (Oral) 6' (1.829 m) 97% 32.41 kg/m2        Blood Pressure from Last 3 Encounters:   17 (!) 152/94   17 138/80   17 138/72    Weight from Last 3 Encounters:   17 239 lb (108.4 kg)   17 242 lb (109.8 kg)   17 237 lb (107.5 kg)              We Performed the Following     PAIN MANAGEMENT CENTER ([place]) REFERRAL        Primary Care Provider Office Phone # Fax #    Fernando Sutton [phone]       [address] [address]        Equal Access to Services     KORTNEY JUAREZ : Hadii aad ku hadasho Soomaali, waaxda luqadaha, qaybta kaalmada adeegyada, waxay idiin hayevonn john kelly . So [facility] [phone].    ATENCIÓN: Si habla español, tiene a quintero disposición servicios gratuitos de asistencia lingüística. Llame al [phone].    We comply with applicable federal civil rights laws and [place] laws. We do not discriminate on the basis of race, color, national origin, age, disability sex, sexual orientation or gender identity.            Thank you!     Thank you for choosing [facility]  for your care. Our goal is always to provide you with excellent care. Hearing back from our patients is one way we can continue to improve our services. Please take a few minutes to complete the written survey that you may receive in the mail after  your visit with us. Thank you!             Your Updated Medication List - Protect others around you: Learn how to safely use, store and throw away your medicines at www.disposemymeds.org.          This list is accurate as of: 8/28/17  2:16 PM.  Always use your most recent med list.                   Brand Name Dispense Instructions for use Diagnosis    ACE NOT PRESCRIBED (INTENTIONAL)     0 each    1 each continuous prn. ACE Inhibitor not prescribed due to Other:questionable SE. Will try again after DM stable.    Type 2 diabetes, HbA1c goal < 7% (H)       aspirin 325 MG buffered tablet      Take  by mouth daily.        blood glucose monitoring meter device kit    no brand specified    1 kit    Use to test blood sugar 1 times daily or as directed.  Also refills on strips and lancets, refills for a year    Type 2 diabetes mellitus without complication (H)       blood glucose monitoring test strip    no brand specified    100 strip    Use to test blood sugars 2  times daily or as directed    Type 2 diabetes mellitus with hyperglycemia, without long-term current use of insulin (H)       diazepam 10 MG tablet    VALIUM    1 tablet    Take 1 tablet (10 mg) by mouth every 6 hours as needed for anxiety or sleep Take 30-60 minutes before procedure.  Do not operate a vehicle after taking this medication.    DDD (degenerative disc disease), lumbar       EPINEPHrine 0.3 MG/0.3ML injection    EPIPEN 2-FAITH    1 each    Inject 0.3 mLs (0.3 mg) into the muscle once as needed for anaphylaxis    Bee sting allergy       glipiZIDE 5 MG tablet    GLUCOTROL    180 tablet    Take 1 tablet (5 mg) by mouth 2 times daily (before meals)    Type 2 diabetes mellitus with hyperglycemia, without long-term current use of insulin (H)       ibuprofen 200 MG tablet    ADVIL/MOTRIN     Take 200 mg by mouth every 4 hours as needed Reported on 4/14/2017        metFORMIN 1000 MG tablet    GLUCOPHAGE    180 tablet    Take 1 tablet (1,000 mg) by mouth 2  times daily (with meals)    Type 2 diabetes mellitus with hyperglycemia, without long-term current use of insulin (H)       nicotine 21 MG/24HR 24 hr patch    NICODERM CQ    30 patch    Place 1 patch onto the skin every 24 hours    Smoker       sildenafil 100 MG cap/tab    VIAGRA    6 tablet    Take 1 tablet (100 mg) by mouth daily as needed for erectile dysfunction    Erectile dysfunction

## 2017-08-28 NOTE — PROGRESS NOTES
"Dr. Levon Andres  [facility]  Neurosurgery Clinic Visit        CC: Low back pain    Primary care Provider: Fernando Sutton      Reason For Visit:   I was asked by Polina Ruiz CNP to consult on the patient for lumbar DDD.      HPI: Zachary Chand is a 57 year old male with low back pain for the past 10+ years.  He states it has progressively worsened over the past several months.  He describes his pain as a constant pain, \"Like a bubble,\" to his mid-lower back.  The pain intensifies with walking, standing, bending, reaching, \"And just about everything.\"  He has some relief while seated but he needs to switch positions frequently.  He denies radicular symptoms.  He denies leg weakness or foot drop.  He denies changes to bowel or bladder.  He has not had recent injections in the past 2 years, however, states he has had epidurals in the past without relief.    Pain right now:  8-9    Past Medical History:   Diagnosis Date     Arthritis      Chronic daily headache 3/24/2011     ED (erectile dysfunction)      Hyperlipidemia LDL goal <100 8/2/2012     Hypertension goal BP (blood pressure) < 140/90 8/2/2012     Smoker 8/2/2012     Type 2 diabetes, HbA1c goal < 7% (H) 8/2/2012     Venous stasis ulcers (H) 7/26/2012       Past Medical History reviewed with patient during visit.    Past Surgical History:   Procedure Laterality Date     HERNIA REPAIR  1997    bilateral groin     ORTHOPEDIC SURGERY  1977    right shoulder dislocation     Past Surgical History reviewed with patient during visit.    Current Outpatient Prescriptions   Medication     metFORMIN (GLUCOPHAGE) 1000 MG tablet     glipiZIDE (GLUCOTROL) 5 MG tablet     blood glucose monitoring (NO BRAND SPECIFIED) test strip     blood glucose monitoring (NO BRAND SPECIFIED) meter device kit     aspirin 325 MG buffered tablet     ibuprofen (ADVIL,MOTRIN) 200 MG tablet     diazepam (VALIUM) 10 MG tablet     nicotine (NICODERM CQ) 21 MG/24HR patch 2h " hr     sildenafil (VIAGRA) 100 MG tablet     EPINEPHrine (EPIPEN 2-FAITH) 0.3 MG/0.3ML injection     ACE NOT PRESCRIBED, INTENTIONAL,     No current facility-administered medications for this visit.        Allergies   Allergen Reactions     Bee Pollen      Swelling      No Known Drug Allergies        Social History     Social History     Marital status: Single     Spouse name: N/A     Number of children: 0     Years of education: 12     Occupational History     [employer]     Social History Main Topics     Smoking status: Current Every Day Smoker     Packs/day: 1.00     Types: Cigarettes     Smokeless tobacco: Never Used     Alcohol use Yes      Comment: 1-5 daily     Drug use: No     Sexual activity: Yes     Partners: Female     Other Topics Concern     Parent/Sibling W/ Cabg, Mi Or Angioplasty Before 65f 55m? No     Social History Narrative       Family History   Problem Relation Age of Onset     Alcohol/Drug Father          ROS: 10 point ROS neg other than the symptoms noted above in the HPI.    Vital Signs: BP (!) 152/94 (BP Location: Right arm, Cuff Size: Adult Regular)  Pulse 106  Temp 98  F (36.7  C) (Oral)  Ht 6' (1.829 m)  Wt 239 lb (108.4 kg)  SpO2 97%  BMI 32.41 kg/m2    Examination:  Constitutional:  Alert, well nourished, NAD.  HEENT: Normocephalic, atraumatic.   Pulm:  Without shortness of breath   CV:  No pitting edema of BLE.    Neurological:  Awake  Alert  Oriented x 3  Speech clear  Cranial nerves II - XII intact    Motor exam   Shoulder Abduction:  Right:  5/5   Left:  5/5  Biceps:                      Right:  5/5   Left:  5/5  Triceps:                     Right:  5/5   Left:  5/5  Wrist Extensors:       Right:  5/5   Left:  5/5  Wrist Flexors:           Right:  5/5   Left:  5/5  Intrinsics:                   Right:  5/5   Left:  5/5  Hip Flexor:                Right: 5/5  Left:  5/5  Hip Adductor:             Right:  5/5  Left:  5/5  Hip Abductor:             Right:  5/5  Left:   "5/5  Gastroc Soleus:        Right:  5/5  Left:  5/5  Tib/Ant:                      Right:  5/5  Left:  5/5  EHL:                          Right:  5/5  Left:  5/5   Sensation normal to bilateral upper and lower extremities  Clonus negative  DTRs 1+ symmetric  Gait: Able to stand from a seated position. Normal non-antalgic, non-myelopathic gait.  Able to heel/toe walk without loss of balance  Cervical examination reveals good range of motion.  No tenderness to palpation of the cervical spine or paraspinous muscles bilaterally.    Lumbar examination reveals tenderness of the spine midline and paraspinous muscles. Limited ROM due to pain. Hip height is symmetrical. Negative SI joint, sciatic notch or greater trochanteric tenderness to palpation bilaterally.  Straight leg raise is negative bilaterally.      Imaging: Lumbar MRI:  IMPRESSION:   1. At L5-S1 there are degenerative changes and a small left central disc protrusion which contacts, but does not displace, the left S1 nerve root. There is mild left foraminal stenosis.  2. At L3-L4 degenerative changes result in moderate to severe central canal and mild bilateral neural foraminal stenosis.  3. At L4-L5 degenerative changes result in mild central canal  stenosis.    Assessment/Plan:  -Lumbar DDD with L3-4 moderate to severe canal stenosis  -Lumbar Radiculopathy    Zachary Chand is a 57 year old male with low back pain for the past 10+ years.  He states it has progressively worsened over the past several months.  He describes his pain as a constant pain, \"Like a bubble,\" to his mid-lower back.  The pain intensifies with walking, standing, bending, reaching, \"And just about everything.\"  He has some relief while seated but he needs to switch positions frequently.  He denies radicular symptoms.  He denies leg weakness or foot drop.  We reviewed MRI results today.  Dr. Andres had reviewed them as well.  The patient is not having radicular symptoms and states the pain is " localized to the lower back.  We are not recommending surgery.  We discussed possible radiofrequency rhizotomy which the patient does not recall having completed in the past.  We will refer him for lumbar MBB to see if he is a candidate for RF.  He is agreeable.  He will contact us if the pain increases, weakness to extremities, and/or pain radiating into the legs.      Patient Instructions   Schedule medical branch blocks (someone will contact you)  Please contact the clinic if pain persists at [phone].        [facility]  [facility]  [address] [address]    Tel [phone]  Pager [phone]

## 2017-08-28 NOTE — NURSING NOTE
"Zachary Chand is a 57 year old male who presents for:  Chief Complaint   Patient presents with     Neurologic Problem     referral from CELI Hernandes CNP for lumbar DDD        Initial Vitals:  There were no vitals taken for this visit. Estimated body mass index is 33.05 kg/(m^2) as calculated from the following:    Height as of 1/11/17: 5' 11.75\" (1.822 m).    Weight as of 8/21/17: 242 lb (109.8 kg).. There is no height or weight on file to calculate BSA. BP completed using cuff size: regular  Data Unavailable    Do you feel safe in your environment?  Yes  Do you need any refills today? No    Nursing Comments: referral from CELI Hernandes CNP for lumbar DDD.  Patient rates his pain today as 8-9       5 min. nursing intake time  Rosalie Giron CMA, AAS      Discharge plan:   See providers dictation   2 min. nursing discharge time  Rosalie Giron CMA, AAS       "

## 2017-08-30 ENCOUNTER — TELEPHONE (OUTPATIENT)
Dept: FAMILY MEDICINE | Facility: CLINIC | Age: 57
End: 2017-08-30

## 2017-08-30 NOTE — TELEPHONE ENCOUNTER
Reason for Call:  Form, our goal is to have forms completed with 72 hours, however, some forms may require a visit or additional information.    Type of letter, form or note:  FMLA    Who is the form from?: Patient    Where did the form come from: Patient or family brought in       What clinic location was the form placed at?: [place] ()    Where the form was placed: 's Box    What number is listed as a contact on the form?: [phone]       Additional comments: Patient says he needs a new letter for work.  It should say something about the return of date is unknown and also what's wrong.  Patient also wants a copy of the form once it has been completed.    Please fax to [phone] and to [phone].     Call taken on 8/30/2017 at 12:33 PM by Monica Caballero

## 2017-08-31 NOTE — TELEPHONE ENCOUNTER
Patient should make an office visit to discuss plans to return to work. With the medial branch block, he should be able to return at reduced capacity and increase as tolerated. I will wait to complete the paperwork, incase he schedules with his primary care provider.

## 2017-09-01 ENCOUNTER — RADIANT APPOINTMENT (OUTPATIENT)
Dept: RADIOLOGY | Facility: CLINIC | Age: 57
End: 2017-09-01
Attending: ANESTHESIOLOGY

## 2017-09-01 ENCOUNTER — RADIOLOGY INJECTION OFFICE VISIT (OUTPATIENT)
Dept: PALLIATIVE MEDICINE | Facility: CLINIC | Age: 57
End: 2017-09-01
Payer: COMMERCIAL

## 2017-09-01 ENCOUNTER — OFFICE VISIT (OUTPATIENT)
Dept: FAMILY MEDICINE | Facility: CLINIC | Age: 57
End: 2017-09-01
Payer: COMMERCIAL

## 2017-09-01 VITALS
WEIGHT: 240 LBS | TEMPERATURE: 99.2 F | HEART RATE: 110 BPM | DIASTOLIC BLOOD PRESSURE: 78 MMHG | OXYGEN SATURATION: 96 % | SYSTOLIC BLOOD PRESSURE: 122 MMHG | BODY MASS INDEX: 32.55 KG/M2

## 2017-09-01 VITALS
HEART RATE: 120 BPM | TEMPERATURE: 99.9 F | DIASTOLIC BLOOD PRESSURE: 85 MMHG | OXYGEN SATURATION: 97 % | SYSTOLIC BLOOD PRESSURE: 141 MMHG

## 2017-09-01 DIAGNOSIS — M54.16 LUMBAR RADICULOPATHY: Primary | ICD-10-CM

## 2017-09-01 DIAGNOSIS — M47.816 LUMBAR FACET ARTHROPATHY: ICD-10-CM

## 2017-09-01 DIAGNOSIS — Z79.2 PREVENTIVE ANTIBIOTIC: Primary | ICD-10-CM

## 2017-09-01 DIAGNOSIS — M51.369 LUMBAR DEGENERATIVE DISC DISEASE: ICD-10-CM

## 2017-09-01 DIAGNOSIS — M47.817 LUMBOSACRAL SPONDYLOSIS WITHOUT MYELOPATHY: ICD-10-CM

## 2017-09-01 DIAGNOSIS — M51.369 DDD (DEGENERATIVE DISC DISEASE), LUMBAR: ICD-10-CM

## 2017-09-01 PROCEDURE — 99213 OFFICE O/P EST LOW 20 MIN: CPT | Performed by: NURSE PRACTITIONER

## 2017-09-01 PROCEDURE — 64493 INJ PARAVERT F JNT L/S 1 LEV: CPT | Mod: 50 | Performed by: ANESTHESIOLOGY

## 2017-09-01 PROCEDURE — 64494 INJ PARAVERT F JNT L/S 2 LEV: CPT | Mod: 50 | Performed by: ANESTHESIOLOGY

## 2017-09-01 RX ORDER — AMOXICILLIN AND CLAVULANATE POTASSIUM 500; 125 MG/1; MG/1
1 TABLET, FILM COATED ORAL
Qty: 15 TABLET | Refills: 0 | Status: SHIPPED | OUTPATIENT
Start: 2017-09-01 | End: 2018-01-08

## 2017-09-01 RX ORDER — OXYCODONE AND ACETAMINOPHEN 5; 325 MG/1; MG/1
1 TABLET ORAL EVERY 6 HOURS PRN
Qty: 30 TABLET | Refills: 0 | Status: SHIPPED | OUTPATIENT
Start: 2017-09-01 | End: 2017-10-26

## 2017-09-01 ASSESSMENT — PAIN SCALES - GENERAL
PAINLEVEL: NO PAIN (0)
PAINLEVEL: SEVERE PAIN (6)
PAINLEVEL: WORST PAIN (10)

## 2017-09-01 NOTE — PATIENT INSTRUCTIONS
[facility]   Medial Branch Block Discharge Instructions      Your procedure was performed by:  Dr. Kian Loredo      Medications used include:  Lidocaine  Bupivicaine  Omnipaque      You will need to complete the Pain Scale Log form and return it to us as soon as possible.  Once we have received the form, we will review it and call you to determine the next steps.     The form can be faxed to [phone] or mailed to:   [facility]   [address]   [place] [address]      You may resume your regular medications    You may resume your regular activities    Be cautious with walking as numbness and/or weakness in the lower extremities may occur for up to 6-8 hours due to the effects of the anesthetic.    Avoid driving for 6 hours. The local anesthetic could slow your reflexes.     You may shower, however no swimming or tub baths or hot tubs for 24 hours following your procedure.    Your pain will return after the numbing medications have worn off.  You may use your current pain medications as needed.    You may use anti-inflammatory medications (such as ibuprofen, aleve, or advil) or Tylenol for pain control if necessary.  Some people find it helpful to alternate ibuprofen and Tylenol every 3 hours for a couple of days.    You may use ice packs 10-15 minutes three to four times a day at the injection site for comfort.     Do not use heat to painful areas for 6 to 8 hours. This will give the local anesthetic time to wear off and prevent you from accidentally burning your skin.     If you experience any of the following, call the pain center nursing line during work hours at [phone] or the after hours provider line at [phone]:  -Fever over 100 degree F  -Swelling, bleeding, redness, drainage, warmth at the injection site  -Progressive weakness or numbness in your legs   -If lumbar, call if you have a loss of bowel or bladder function  -Unusual new  onset of pain that is not improving

## 2017-09-01 NOTE — NURSING NOTE
Chief Complaint   Patient presents with     Pain       Initial /86  Pulse 123 Estimated body mass index is 32.41 kg/(m^2) as calculated from the following:    Height as of 8/28/17: 1.829 m (6').    Weight as of 8/28/17: 108.4 kg (239 lb).  Medication Reconciliation: complete     Injection intake:    If this procedure is requiring IV sedation has patient been NPO for 6  Hours? NA    Is patient on coumadin, plavix or other prescribed blood thinner?   No    If patient is on coumadin was it held for 5 days?   NA    If patient is on plavix was it held for 7 days?    Yes     Does patient take aspirin?  Yes -   ASA    If this is for a cervical procedure and patient is on aspirin has it been held for 6 days?   NA    Any allergies to contrast dye, iodine, steroid and/or numbing medications?  NO    Is patient currently taking antibiotics or have an active infection?  NO    Does patient have a ? Yes       Is patient pregnant or breastfeeding?  Not Applicable    Are the vital signs normal?  Yes    Kaitlynn Kuhn CMA ([facility])

## 2017-09-01 NOTE — PROGRESS NOTES
Pre procedure Diagnosis: lumbar facet arthropathy, lumbosacral spondylosis   Post procedure Diagnosis: Same  Procedure performed: lumbar medial branch block #1 bilaterally at L3, L4, and L5  Indication:  Diagnostic   Anesthesia: none  Complications: none  Operators: Kian Loredo MD     Indications:   Zachary Chand is a 57 year old male was sent by  BALDOMERO Rodriguez for lumbar facet procedures.  The patient has a history of chronic lower back pain without radiation.  Exam shows lumbar tenderness to palpation over the paraspinal muscles and lumbar facet joints and increased pain with extension and lateral rotation of the lumbar spine and they have tried conservative treatment including physical therapy, activity modifications, and medications.    Options/alternatives, benefits and risks were discussed with the patient including but not limited to bleeding, infection, tissue trauma, exposure to radiation, reaction to medications, spinal cord injury, weakness, numbness and paralysis.  Questions were answered to his satisfaction and he agrees to proceed. Voluntary informed consent was obtained and signed.     Vitals were reviewed: Yes  /85  Pulse 120  Temp 99.9  F (37.7  C)  SpO2 97%  Allergies were reviewed:  Yes   Medications were reviewed:  Yes   Pre-procedure pain score: 9/10    Procedure:  After obtaining signed informed consent, the patient was brought into the procedure suite and was placed in a prone position on the procedure table.   A Pause for the Cause was performed.  The patient was prepped and draped in the usual sterile fashion.     Under AP fluoroscopic guidance the L3, L4, L5 vertebral bodies were identified. The C-arm was rotated to the oblique view to afford optimal visualization the pedicles.  Lidocaine 1% was used to anesthetize the skin at each level.  Under intermittent fluoroscopy, 25 G 5 inch spinal needles were positioned inferior and lateral to the intersection of the  transverse process and pedicle at the Bilateral L3, L4 & L5 levels. The needle positions were verified and optimized from the AP view.    The anatomic targets for the L2, L3 & L4 medial nerves were the  L3, L4 & L5 transverse processes, with laterality as described above, resulting in blockade of the L3-4 and L4/5 facet joints.    Omnipaque-300 was injected at each site, and appropriate spread of contrast was noted without vascular uptake.  A total of 1 ml of Omnipaque was used.  9 ml was wasted. Bupivacaine 0.5% 0.5 ml was injected at each location.  The needles were removed.      Hemostasis was achieved, the area was cleaned, and bandaids were placed when appropriate.  The patient tolerated the procedure well, and was taken to the recovery room.    Images were saved to PACS.     The patient will continue to monitor progress, and they were given a pain diary to complete at home.  They will either fax or mail this back to us or bring it to their next appointment. We will determine the treatment plan after we review the diary.      Post-procedure pain score: 0/10  Follow-up includes:   -f/u phone call in one week  -will await diary for further planning.    Kian Loredo MD  [facility]

## 2017-09-01 NOTE — MR AVS SNAPSHOT
"              After Visit Summary   2017    Zachary Chand    MRN: [id number]           Patient Information     Date Of Birth          1960        Visit Information        Provider Department      2017 1:00 PM Sara Hernandes APRN CNP [facility]        Today's Diagnoses     Lumbar radiculopathy    -  1    DDD (degenerative disc disease), lumbar           Follow-ups after your visit        Who to contact     If you have questions or need follow up information about today's clinic visit or your schedule please contact [facility] directly at [phone].  Normal or non-critical lab and imaging results will be communicated to you by MyChart, letter or phone within 4 business days after the clinic has received the results. If you do not hear from us within 7 days, please contact the clinic through [facility] or phone. If you have a critical or abnormal lab result, we will notify you by phone as soon as possible.  Submit refill requests through [facility] or call your pharmacy and they will forward the refill request to us. Please allow 3 business days for your refill to be completed.          Additional Information About Your Visit        MyChart Information     [facility] lets you send messages to your doctor, view your test results, renew your prescriptions, schedule appointments and more. To sign up, go to [email or web address] . Click on \"Log in\" on the left side of the screen, which will take you to the Welcome page. Then click on \"Sign up Now\" on the right side of the page.     You will be asked to enter the access code listed below, as well as some personal information. Please follow the directions to create your username and password.     Your access code is: [email or web address]  Expires: 2017  3:52 PM     Your access code will  in 90 days. If you need help or a new code, please call your [facility] or [phone].        Care EveryWhere ID  "    This is your Care EveryWhere ID. This could be used by other organizations to access your [facility] medical records  [id number]        Your Vitals Were     Pulse Temperature Pulse Oximetry BMI (Body Mass Index)          110 99.2  F (37.3  C) (Oral) 96% 32.55 kg/m2         Blood Pressure from Last 3 Encounters:   09/01/17 122/78   09/01/17 141/85   08/28/17 (!) 152/94    Weight from Last 3 Encounters:   09/01/17 240 lb (108.9 kg)   08/28/17 239 lb (108.4 kg)   08/21/17 242 lb (109.8 kg)              Today, you had the following     No orders found for display         Today's Medication Changes          These changes are accurate as of: 9/1/17  1:25 PM.  If you have any questions, ask your nurse or doctor.               Start taking these medicines.        Dose/Directions    amoxicillin-clavulanate 500-125 MG per tablet   Commonly known as:  AUGMENTIN   Used for:  Preventive antibiotic   Started by:  Kian Ambrose MD        Dose:  1 tablet   Take 1 tablet by mouth 3 times daily (with meals)   Quantity:  15 tablet   Refills:  0       oxyCODONE-acetaminophen 5-325 MG per tablet   Commonly known as:  PERCOCET   Used for:  Lumbar radiculopathy, DDD (degenerative disc disease), lumbar   Started by:  Sara Hernandes APRN CNP        Dose:  1 tablet   Take 1 tablet by mouth every 6 hours as needed for pain maximum 2 tablet(s) per day   Quantity:  30 tablet   Refills:  0            Where to get your medicines      These medications were sent to [facility] - [address] [address]  [address] [address]     Phone:  [phone]     amoxicillin-clavulanate 500-125 MG per tablet         Some of these will need a paper prescription and others can be bought over the counter.  Ask your nurse if you have questions.     Bring a paper prescription for each of these medications     oxyCODONE-acetaminophen 5-325 MG per tablet                Primary Care Provider Office  Phone # Fax #    Fernando Sutton [phone]       [address] [facility] [address]        Equal Access to Services     AQUILINO JUAREZ : Hadii aad ku hadvalerichi Torytheo, waglendyda mitchelleidyha, giancarlota kaminhda lisha, eddie shetty laEnedinaesthela ba. So [facility] [phone].    ATENCIÓN: Si habla español, tiene a quintero disposición servicios gratuitos de asistencia lingüística. Llame al [phone].    We comply with applicable federal civil rights laws and [place] laws. We do not discriminate on the basis of race, color, national origin, age, disability sex, sexual orientation or gender identity.            Thank you!     Thank you for choosing [facility]  for your care. Our goal is always to provide you with excellent care. Hearing back from our patients is one way we can continue to improve our services. Please take a few minutes to complete the written survey that you may receive in the mail after your visit with us. Thank you!             Your Updated Medication List - Protect others around you: Learn how to safely use, store and throw away your medicines at www.disposemymeds.org.          This list is accurate as of: 9/1/17  1:25 PM.  Always use your most recent med list.                   Brand Name Dispense Instructions for use Diagnosis    ACE NOT PRESCRIBED (INTENTIONAL)     0 each    1 each continuous prn. ACE Inhibitor not prescribed due to Other:questionable SE. Will try again after DM stable.    Type 2 diabetes, HbA1c goal < 7% (H)       amoxicillin-clavulanate 500-125 MG per tablet    AUGMENTIN    15 tablet    Take 1 tablet by mouth 3 times daily (with meals)    Preventive antibiotic       aspirin 325 MG buffered tablet      Take  by mouth daily.        blood glucose monitoring meter device kit    no brand specified    1 kit    Use to test blood sugar 1 times daily or as directed.  Also refills on strips and lancets, refills for a year    Type 2  diabetes mellitus without complication (H)       blood glucose monitoring test strip    no brand specified    100 strip    Use to test blood sugars 2  times daily or as directed    Type 2 diabetes mellitus with hyperglycemia, without long-term current use of insulin (H)       EPINEPHrine 0.3 MG/0.3ML injection    EPIPEN 2-FAITH    1 each    Inject 0.3 mLs (0.3 mg) into the muscle once as needed for anaphylaxis    Bee sting allergy       glipiZIDE 5 MG tablet    GLUCOTROL    180 tablet    Take 1 tablet (5 mg) by mouth 2 times daily (before meals)    Type 2 diabetes mellitus with hyperglycemia, without long-term current use of insulin (H)       ibuprofen 200 MG tablet    ADVIL/MOTRIN     Take 200 mg by mouth every 4 hours as needed Reported on 4/14/2017        metFORMIN 1000 MG tablet    GLUCOPHAGE    180 tablet    Take 1 tablet (1,000 mg) by mouth 2 times daily (with meals)    Type 2 diabetes mellitus with hyperglycemia, without long-term current use of insulin (H)       nicotine 21 MG/24HR 24 hr patch    NICODERM CQ    30 patch    Place 1 patch onto the skin every 24 hours    Smoker       oxyCODONE-acetaminophen 5-325 MG per tablet    PERCOCET    30 tablet    Take 1 tablet by mouth every 6 hours as needed for pain maximum 2 tablet(s) per day    Lumbar radiculopathy, DDD (degenerative disc disease), lumbar       sildenafil 100 MG cap/tab    VIAGRA    6 tablet    Take 1 tablet (100 mg) by mouth daily as needed for erectile dysfunction    Erectile dysfunction

## 2017-09-01 NOTE — LETTER
[address] [address]  Phone: [phone]  Fax: [phone]    September 1, 2017        Zachary Chand  [address] [address]          To whom it may concern:    RE: Zachary Chand    Patient was seen and treated today at our clinic. He has degenerative disc disease of the lumbar spine with disc herniation and nerve impingement at L5-S1. He is undergoing treatment with our pain team and may require surgery in the future. I recommended he return to work with the following restrictions: work maximum of 4 hours/day, do not lift/push/pull greater than 10 pounds, no bending. If you are unable to accommodate these restrictions he will be unable to return to work until his symptoms resolve (date unknown).     Please contact me for questions or concerns.      Sincerely,        BALDOMERO Herrera CNP

## 2017-09-01 NOTE — NURSING NOTE
Discharge Information    IV Discontiued Time:  NA    Amount of Fluid Infused:  NA    Discharge Criteria = When patient returns to baseline or as per MD order    Consciousness:  Pt is fully awake    Circulation:  BP +/- 20% of pre-procedure level    Respiration:  Patient is able to breathe deeply    O2 Sat:  Patient is able to maintain O2 Sat >92% on room air    Activity:  Moves 4 extremities on command    Ambulation:  Patient is able to stand and walk or stand and pivot into wheelchair    Dressing:  Clean/dry or No Dressing    Notes:   Discharge instructions and AVS given to patient    Patient meets criteria for discharge?  YES    Admitted to PCU?  No    Responsible adult present to accompany patient home?  Yes    Signature/Title:    Nelly Walden RN Care Coordinator  [facility]

## 2017-09-01 NOTE — MR AVS SNAPSHOT
After Visit Summary   9/1/2017    Zachary Chand    MRN: [id number]           Patient Information     Date Of Birth          1960        Visit Information        Provider Department      9/1/2017 8:15 AM Kian Ambrose MD [facility]        Care Instructions    [facility]   Medial Branch Block Discharge Instructions      Your procedure was performed by:  Dr. Kian Loredo      Medications used include:  Lidocaine  Bupivicaine  Omnipaque      You will need to complete the Pain Scale Log form and return it to us as soon as possible.  Once we have received the form, we will review it and call you to determine the next steps.     The form can be faxed to [phone] or mailed to:   [facility]   [id number] [place] [address] [address]   [place] [address]      You may resume your regular medications    You may resume your regular activities    Be cautious with walking as numbness and/or weakness in the lower extremities may occur for up to 6-8 hours due to the effects of the anesthetic.    Avoid driving for 6 hours. The local anesthetic could slow your reflexes.     You may shower, however no swimming or tub baths or hot tubs for 24 hours following your procedure.    Your pain will return after the numbing medications have worn off.  You may use your current pain medications as needed.    You may use anti-inflammatory medications (such as ibuprofen, aleve, or advil) or Tylenol for pain control if necessary.  Some people find it helpful to alternate ibuprofen and Tylenol every 3 hours for a couple of days.    You may use ice packs 10-15 minutes three to four times a day at the injection site for comfort.     Do not use heat to painful areas for 6 to 8 hours. This will give the local anesthetic time to wear off and prevent you from accidentally burning your skin.     If you experience any of the following, call the pain center nursing line during work  "hours at [phone] or the after hours provider line at [phone]:  -Fever over 100 degree F  -Swelling, bleeding, redness, drainage, warmth at the injection site  -Progressive weakness or numbness in your legs   -If lumbar, call if you have a loss of bowel or bladder function  -Unusual new onset of pain that is not improving            Follow-ups after your visit        Your next 10 appointments already scheduled     Sep 01, 2017  1:00 PM CDT   Office Visit with BALDOMERO Stewart CNP   [facility]    [address] [place] [id number] [phone]           Bring a current list of meds and any records pertaining to this visit. For Physicals, please bring immunization records and any forms needing to be filled out. Please arrive 10 minutes early to complete paperwork.              Who to contact     If you have questions or need follow up information about today's clinic visit or your schedule please contact [facility] directly at [phone].  Normal or non-critical lab and imaging results will be communicated to you by nfonhart, letter or phone within 4 business days after the clinic has received the results. If you do not hear from us within 7 days, please contact the clinic through [employer] or phone. If you have a critical or abnormal lab result, we will notify you by phone as soon as possible.  Submit refill requests through [employer] or call your pharmacy and they will forward the refill request to us. Please allow 3 business days for your refill to be completed.          Additional Information About Your Visit        [employer] Information     [employer] lets you send messages to your doctor, view your test results, renew your prescriptions, schedule appointments and more. To sign up, go to [email or web address] . Click on \"Log in\" on the left side of the screen, which will take you to the Welcome " "page. Then click on \"Sign up Now\" on the right side of the page.     You will be asked to enter the access code listed below, as well as some personal information. Please follow the directions to create your username and password.     Your access code is: [email or web address]  Expires: 2017  3:52 PM     Your access code will  in 90 days. If you need help or a new code, please call your [facility] clinic or [phone].        Care EveryWhere ID     This is your Care EveryWhere ID. This could be used by other organizations to access your [facility] medical records  [id number]        Your Vitals Were     Pulse                   123            Blood Pressure from Last 3 Encounters:   17 146/86   17 (!) 152/94   17 138/80    Weight from Last 3 Encounters:   17 108.4 kg (239 lb)   17 109.8 kg (242 lb)   17 107.5 kg (237 lb)              Today, you had the following     No orders found for display       Primary Care Provider Office Phone # Fax #    Fernando Sutton [phone]       [address] [facility] [address]        Equal Access to Services     [facility] Hospital[facility]SEANN AH: Hadii aad ku hadasho Soomaali, waaxda luqadaha, qaybta kaalmada adeegyada, eddie gonzalezin hayevonn john kelly . So [facility] [phone].    ATENCIÓN: Si habla español, tiene a quintero disposición servicios gratuitos de asistencia lingüística. Llame al [phone].    We comply with applicable federal civil rights laws and [place] laws. We do not discriminate on the basis of race, color, national origin, age, disability sex, sexual orientation or gender identity.            Thank you!     Thank you for choosing [facility]  for your care. Our goal is always to provide you with excellent care. Hearing back from our patients is one way we can continue to improve our services. Please take a few minutes to complete the written survey that you may receive in the mail after your visit " with us. Thank you!             Your Updated Medication List - Protect others around you: Learn how to safely use, store and throw away your medicines at www.disposemymeds.org.          This list is accurate as of: 9/1/17  8:31 AM.  Always use your most recent med list.                   Brand Name Dispense Instructions for use Diagnosis    ACE NOT PRESCRIBED (INTENTIONAL)     0 each    1 each continuous prn. ACE Inhibitor not prescribed due to Other:questionable SE. Will try again after DM stable.    Type 2 diabetes, HbA1c goal < 7% (H)       aspirin 325 MG buffered tablet      Take  by mouth daily.        blood glucose monitoring meter device kit    no brand specified    1 kit    Use to test blood sugar 1 times daily or as directed.  Also refills on strips and lancets, refills for a year    Type 2 diabetes mellitus without complication (H)       blood glucose monitoring test strip    no brand specified    100 strip    Use to test blood sugars 2  times daily or as directed    Type 2 diabetes mellitus with hyperglycemia, without long-term current use of insulin (H)       diazepam 10 MG tablet    VALIUM    1 tablet    Take 1 tablet (10 mg) by mouth every 6 hours as needed for anxiety or sleep Take 30-60 minutes before procedure.  Do not operate a vehicle after taking this medication.    DDD (degenerative disc disease), lumbar       EPINEPHrine 0.3 MG/0.3ML injection    EPIPEN 2-FAITH    1 each    Inject 0.3 mLs (0.3 mg) into the muscle once as needed for anaphylaxis    Bee sting allergy       glipiZIDE 5 MG tablet    GLUCOTROL    180 tablet    Take 1 tablet (5 mg) by mouth 2 times daily (before meals)    Type 2 diabetes mellitus with hyperglycemia, without long-term current use of insulin (H)       ibuprofen 200 MG tablet    ADVIL/MOTRIN     Take 200 mg by mouth every 4 hours as needed Reported on 4/14/2017        metFORMIN 1000 MG tablet    GLUCOPHAGE    180 tablet    Take 1 tablet (1,000 mg) by mouth 2 times daily  (with meals)    Type 2 diabetes mellitus with hyperglycemia, without long-term current use of insulin (H)       nicotine 21 MG/24HR 24 hr patch    NICODERM CQ    30 patch    Place 1 patch onto the skin every 24 hours    Smoker       sildenafil 100 MG cap/tab    VIAGRA    6 tablet    Take 1 tablet (100 mg) by mouth daily as needed for erectile dysfunction    Erectile dysfunction

## 2017-09-01 NOTE — PROGRESS NOTES
"  SUBJECTIVE:   Zachary Chand is a 57 year old male who presents to clinic today for the following health issues:      FMLA Paper work   Patient met with neurosurgery. Recommended injection vs surgery  He failed multiple injections \"13\" in the past and declined surgery  Received MBB this morning. Only minimal relief  Plan to follow up with Dr. Catalina Loredo for repeat MBB and if appropriate radiofrequency rhizotomy    Has paperwork for STD and FMLA  Reports his boss won't allow him to return to work until he is at full capacity  Reports he is unable to flex his spine  \"i can't tie my shoes\"  Pain not relieved with tylenol or ibuprofen    Pain improved with laying flat on hard floor with tennis ball under his back      Problem list and histories reviewed & adjusted, as indicated.  Additional history: none    Patient Active Problem List   Diagnosis     Chronic daily headache     Venous stasis ulcers (H)     Type 2 diabetes, HbA1c goal < 7% (H)     Hyperlipidemia LDL goal <100     Hypertension goal BP (blood pressure) < 140/90     Venous insufficiency     Bee sting allergy     Contusion of bone     Varicose veins     Tobacco abuse     Obesity     Past Surgical History:   Procedure Laterality Date     HERNIA REPAIR  1997    bilateral groin     ORTHOPEDIC SURGERY  1977    right shoulder dislocation       Social History   Substance Use Topics     Smoking status: Current Every Day Smoker     Packs/day: 1.00     Types: Cigarettes     Smokeless tobacco: Never Used     Alcohol use Yes      Comment: 1-5 daily     Family History   Problem Relation Age of Onset     Alcohol/Drug Father              Reviewed and updated as needed this visit by clinical staffTobacco  Allergies  Med Hx  Surg Hx  Fam Hx  Soc Hx      Reviewed and updated as needed this visit by Provider         ROS:  Constitutional, HEENT, cardiovascular, pulmonary, gi and gu systems are negative, except as otherwise noted.      OBJECTIVE:   /78 (BP Location: " Right arm, Patient Position: Sitting, Cuff Size: Adult Large)  Pulse 110  Temp 99.2  F (37.3  C) (Oral)  Wt 240 lb (108.9 kg)  SpO2 96%  BMI 32.55 kg/m2  Body mass index is 32.55 kg/(m^2).  GENERAL: healthy, alert and no distress  RESP: lungs clear to auscultation - no rales, rhonchi or wheezes  CV: regular rate and rhythm, normal S1 S2, no S3 or S4, no murmur, click or rub, no peripheral edema and peripheral pulses strong  MS: Gait slow and guarded. Pain to palpation lumbar spine. ROM limited by pain.   SKIN: no suspicious lesions or rashes  NEURO: mentation intact and speech normal, sensation grossly intact    Diagnostic Test Results:  none     ASSESSMENT/PLAN:       ICD-10-CM    1. Lumbar radiculopathy M54.16 oxyCODONE-acetaminophen (PERCOCET) 5-325 MG per tablet   2. DDD (degenerative disc disease), lumbar M51.36 oxyCODONE-acetaminophen (PERCOCET) 5-325 MG per tablet       Paperwork for STD and FMLA completed. Will be faxed appropriately and patient requests copy to be mailed to him. Patient to follow up with Dr. Catalina Loredo as previously discussed.   If MBB was not successful, will unlikely be a candidate for radiofrequency rhizotomy. At that time, would recommend follow up with surgery. Advised patient to return to work at reduced capacity (please refer to work note).   Did write for percocet for break through pain. Reviewed risks and benefits, including habit formation, drowsiness, confusion, constipation. Do not take before driving or working. Advised patient that this medication will not be continued for long-term    BALDOMERO Herrera [facility]

## 2017-09-05 ENCOUNTER — TELEPHONE (OUTPATIENT)
Dept: PALLIATIVE MEDICINE | Facility: CLINIC | Age: 57
End: 2017-09-05

## 2017-09-05 NOTE — TELEPHONE ENCOUNTER
Pt had a bilateral lumbar medial branch block # 1 on 9/1/17.  The post medial branch block form was received.      According to pain diary pt would like to proceed with medial branch block #2.       Max relief from block is:    At rest:                 100%  Left fact load:       91%  Right facet load:  78%  Normal activity:    50%    Insurance:  Unsure.  [employer] according to previous intake.    Does pt have adequate relief insurance-wise to proceed to medial branch block #2?  TBD     Routed to scheduling coordinators to call pt to schedule a lumbar medial branch block #2.      Lili Rojo, RN-BSN  [facility]

## 2017-09-05 NOTE — LETTER
September 6, 2017    Zachary Chand  [address] [address]          Dear Zachary,                                                                     You have been referred to [facility]. We have been unable to contact you by telephone to schedule your appointment. Please call our clinic at [phone] to schedule this appointment.       Sincerely,        [facility]

## 2017-09-06 NOTE — TELEPHONE ENCOUNTER
Tried reaching patient to schedule injection.   VM not set up. Sending letter.       Mary Jane FRIEDMAN    [facility]

## 2017-09-11 ENCOUNTER — TELEPHONE (OUTPATIENT)
Dept: FAMILY MEDICINE | Facility: CLINIC | Age: 57
End: 2017-09-11

## 2017-09-11 DIAGNOSIS — M51.369 DDD (DEGENERATIVE DISC DISEASE), LUMBAR: Primary | ICD-10-CM

## 2017-09-11 NOTE — TELEPHONE ENCOUNTER
Reason for Call: Request for an order or referral:    Order or referral being requested: Patient calling and requesting a referral to a back surgeon. Patient recently completed MRI imaging. Patient would like to see a Dr Horne at [facility] in [place].    Date needed: as soon as possible    Has the patient been seen by the PCP for this problem? YES    Additional comments:     Phone number Patient can be reached at:  Home number on file [phone] (home)    Best Time:  any    Can we leave a detailed message on this number?  YES    Call taken on 9/11/2017 at 9:32 AM by Rosalie Shea

## 2017-09-18 ENCOUNTER — TELEPHONE (OUTPATIENT)
Dept: FAMILY MEDICINE | Facility: CLINIC | Age: 57
End: 2017-09-18

## 2017-09-18 NOTE — TELEPHONE ENCOUNTER
Reason for Call:  Other - Requesting Records    Detailed comments: Char from [facility] called and stated it was her 3rd time calling. Patient was referred to them by Sara Hernandes, however, the referral did not include any office notes pertaining to this patient's condition the last two years. Char has called medical records twice but has only gotten voicemail. She asked if Sara Hernandes or Dr. Sutton's care team could fax over any pertinent visit notes from the last two years. Please fax to [phone] as soon as possible. Patient is being seeing there tomorrow.     Phone Number Patient can be reached at: Char: [facility]: [phone]    Best Time: As soon as possible    Can we leave a detailed message on this number? YES    Call taken on 9/18/2017 at 2:33 PM by Renee Herr

## 2017-09-19 ENCOUNTER — TELEPHONE (OUTPATIENT)
Dept: FAMILY MEDICINE | Facility: CLINIC | Age: 57
End: 2017-09-19

## 2017-09-19 ENCOUNTER — TRANSFERRED RECORDS (OUTPATIENT)
Dept: HEALTH INFORMATION MANAGEMENT | Facility: CLINIC | Age: 57
End: 2017-09-19

## 2017-09-19 NOTE — TELEPHONE ENCOUNTER
Reason for Call:  Last office notes & MRI report and Images from 08-    Detailed comments: Patient is seeing  now and the physician is calling requesting this as soon as possible.  Please fax to [phone].    Phone Number Patient can be reached at:   - Neurosurgery [phone]         Best Time: ASAP    Can we leave a detailed message on this number? YES    Call taken on 9/19/2017 at 11:31 AM by Bebe Oliveros

## 2017-09-22 NOTE — TELEPHONE ENCOUNTER
Closing encounter- when pt calls back he can schedule.     Uma Walden RN, [facility]  Pain Clinic Care Coordinator

## 2017-10-26 DIAGNOSIS — M54.16 LUMBAR RADICULOPATHY: ICD-10-CM

## 2017-10-26 DIAGNOSIS — M51.369 DDD (DEGENERATIVE DISC DISEASE), LUMBAR: ICD-10-CM

## 2017-10-26 RX ORDER — OXYCODONE AND ACETAMINOPHEN 5; 325 MG/1; MG/1
1 TABLET ORAL EVERY 6 HOURS PRN
Qty: 30 TABLET | Refills: 0 | Status: SHIPPED | OUTPATIENT
Start: 2017-10-26 | End: 2018-01-08

## 2017-10-26 NOTE — TELEPHONE ENCOUNTER
Reason for Call:  Medication or medication refill:    Name of the pharmacy and phone number for the current request:  Hard Copy    Name of the medication requested: Percocet    Other request: Patient is having surgery on 11-10, seeing  on 10-31 for pre-op.    Can we leave a detailed message on this number? YES    Phone number patient can be reached at:     [phone]     Best Time: anytime    Call taken on 10/26/2017 at 2:51 PM by Bebe Oliveros

## 2017-10-26 NOTE — TELEPHONE ENCOUNTER
Last seen 9/1/17 by sundar [facility] on 9/1/17 for 30 tabs with no refills.  See patient note, scheduled for surgery and pre-op.    Routed to provider to address.    Routing refill request to provider for review/approval because:  Drug not on the [facility] refill protocol   Gabi Lee RN  [facility]

## 2017-10-27 NOTE — TELEPHONE ENCOUNTER
Message left with female at provided number that script is available for  at the [facility] .

## 2017-10-31 ENCOUNTER — OFFICE VISIT (OUTPATIENT)
Dept: FAMILY MEDICINE | Facility: CLINIC | Age: 57
End: 2017-10-31
Payer: COMMERCIAL

## 2017-10-31 VITALS
WEIGHT: 241.25 LBS | DIASTOLIC BLOOD PRESSURE: 78 MMHG | TEMPERATURE: 98 F | SYSTOLIC BLOOD PRESSURE: 152 MMHG | BODY MASS INDEX: 32.72 KG/M2 | HEART RATE: 98 BPM

## 2017-10-31 DIAGNOSIS — M54.40 LOW BACK PAIN WITH SCIATICA, SCIATICA LATERALITY UNSPECIFIED, UNSPECIFIED BACK PAIN LATERALITY, UNSPECIFIED CHRONICITY: ICD-10-CM

## 2017-10-31 DIAGNOSIS — Z01.818 PREOP GENERAL PHYSICAL EXAM: Primary | ICD-10-CM

## 2017-10-31 DIAGNOSIS — I10 HYPERTENSION GOAL BP (BLOOD PRESSURE) < 140/90: ICD-10-CM

## 2017-10-31 DIAGNOSIS — E11.9 TYPE 2 DIABETES MELLITUS WITHOUT COMPLICATION, WITHOUT LONG-TERM CURRENT USE OF INSULIN (H): ICD-10-CM

## 2017-10-31 LAB
ALBUMIN SERPL-MCNC: 3.8 G/DL (ref 3.4–5)
ALP SERPL-CCNC: 55 U/L (ref 40–150)
ALT SERPL W P-5'-P-CCNC: 36 U/L (ref 0–70)
ANION GAP SERPL CALCULATED.3IONS-SCNC: 6 MMOL/L (ref 3–14)
AST SERPL W P-5'-P-CCNC: 13 U/L (ref 0–45)
BASOPHILS # BLD AUTO: 0 10E9/L (ref 0–0.2)
BASOPHILS NFR BLD AUTO: 0.5 %
BILIRUB SERPL-MCNC: 0.3 MG/DL (ref 0.2–1.3)
BUN SERPL-MCNC: 12 MG/DL (ref 7–30)
CALCIUM SERPL-MCNC: 9.3 MG/DL (ref 8.5–10.1)
CHLORIDE SERPL-SCNC: 103 MMOL/L (ref 94–109)
CO2 SERPL-SCNC: 29 MMOL/L (ref 20–32)
CREAT SERPL-MCNC: 0.78 MG/DL (ref 0.66–1.25)
DIFFERENTIAL METHOD BLD: ABNORMAL
EOSINOPHIL # BLD AUTO: 0.2 10E9/L (ref 0–0.7)
EOSINOPHIL NFR BLD AUTO: 3.3 %
ERYTHROCYTE [DISTWIDTH] IN BLOOD BY AUTOMATED COUNT: 12 % (ref 10–15)
GFR SERPL CREATININE-BSD FRML MDRD: >90 ML/MIN/1.7M2
GLUCOSE SERPL-MCNC: 206 MG/DL (ref 70–99)
HBA1C MFR BLD: 7.2 % (ref 4.3–6)
HCT VFR BLD AUTO: 45.1 % (ref 40–53)
HGB BLD-MCNC: 15.6 G/DL (ref 13.3–17.7)
LYMPHOCYTES # BLD AUTO: 2.2 10E9/L (ref 0.8–5.3)
LYMPHOCYTES NFR BLD AUTO: 36.5 %
MCH RBC QN AUTO: 33.3 PG (ref 26.5–33)
MCHC RBC AUTO-ENTMCNC: 34.6 G/DL (ref 31.5–36.5)
MCV RBC AUTO: 96 FL (ref 78–100)
MONOCYTES # BLD AUTO: 0.8 10E9/L (ref 0–1.3)
MONOCYTES NFR BLD AUTO: 12.9 %
NEUTROPHILS # BLD AUTO: 2.9 10E9/L (ref 1.6–8.3)
NEUTROPHILS NFR BLD AUTO: 46.8 %
PLATELET # BLD AUTO: 205 10E9/L (ref 150–450)
POTASSIUM SERPL-SCNC: 4.5 MMOL/L (ref 3.4–5.3)
PROT SERPL-MCNC: 7.9 G/DL (ref 6.8–8.8)
RBC # BLD AUTO: 4.68 10E12/L (ref 4.4–5.9)
SODIUM SERPL-SCNC: 138 MMOL/L (ref 133–144)
WBC # BLD AUTO: 6.1 10E9/L (ref 4–11)

## 2017-10-31 PROCEDURE — 36415 COLL VENOUS BLD VENIPUNCTURE: CPT | Performed by: FAMILY MEDICINE

## 2017-10-31 PROCEDURE — 93005 ELECTROCARDIOGRAM TRACING: CPT | Performed by: FAMILY MEDICINE

## 2017-10-31 PROCEDURE — 83036 HEMOGLOBIN GLYCOSYLATED A1C: CPT | Performed by: FAMILY MEDICINE

## 2017-10-31 PROCEDURE — 85025 COMPLETE CBC W/AUTO DIFF WBC: CPT | Performed by: FAMILY MEDICINE

## 2017-10-31 PROCEDURE — 99215 OFFICE O/P EST HI 40 MIN: CPT | Performed by: FAMILY MEDICINE

## 2017-10-31 PROCEDURE — 80053 COMPREHEN METABOLIC PANEL: CPT | Performed by: FAMILY MEDICINE

## 2017-10-31 PROCEDURE — 93010 ELECTROCARDIOGRAM REPORT: CPT | Performed by: INTERNAL MEDICINE

## 2017-10-31 RX ORDER — LISINOPRIL 10 MG/1
10 TABLET ORAL DAILY
Qty: 30 TABLET | Refills: 1 | Status: SHIPPED | OUTPATIENT
Start: 2017-10-31 | End: 2018-01-08

## 2017-10-31 ASSESSMENT — PAIN SCALES - GENERAL: PAINLEVEL: NO PAIN (0)

## 2017-10-31 NOTE — PATIENT INSTRUCTIONS
Before Your Surgery      Call your surgeon if there is any change in your health. This includes signs of a cold or flu (such as a sore throat, runny nose, cough, rash or fever).    Do not smoke, drink alcohol or take over the counter medicine (unless your surgeon or primary care doctor tells you to) for the 24 hours before and after surgery.    If you take prescribed drugs: Follow your doctor s orders about which medicines to take and which to stop until after surgery.    Eating and drinking prior to surgery: follow the instructions from your surgeon    Take a shower or bath the night before surgery. Use the soap your surgeon gave you to gently clean your skin. If you do not have soap from your surgeon, use your regular soap. Do not shave or scrub the surgery site.  Wear clean pajamas and have clean sheets on your bed.     Hold metformin and glipizide night before and morning of procedure    Work on smoking cessation    Start lisinopril 10 mg once daily, and take this early am of procedure    Hold aspirin one week prior to procedure.  Resume after surgery    See Dr. Sutton in 1-2 months, sooner if needed    We will send you lab results .

## 2017-10-31 NOTE — PROGRESS NOTES
[address] [address]  [phone]  Dept: [phone]    PRE-OP EVALUATION:  Today's date: 10/31/2017    Zachary Chand (: 1960) presents for pre-operative evaluation assessment as requested by Dr. Castañeda.  He requires evaluation and anesthesia risk assessment prior to undergoing surgery/procedure for treatment of back pain .  Proposed procedure: Lumbar 3-4 Laminectomy    Date of Surgery/ Procedure: 11/10/2017  Time of Surgery/ Procedure:   Hospital/Surgical Facility: [facility]  Fax number for surgical facility: [phone]  Primary Physician: Fernando Sutton  Type of Anesthesia Anticipated: General    Patient has a Health Care Directive or Living Will:  NO    Preop Questions 10/31/2017   1.  Do you have a history of heart attack, stroke, stent, bypass or surgery on an artery in the head, neck, heart or legs? No   2.  Do you ever have any pain or discomfort in your chest? No   3.  Do you have a history of  Heart Failure? No   4.   Are you troubled by shortness of breath when:  walking on a level surface, or up a slight hill, or at night? No   5.  Do you currently have a cold, bronchitis or other respiratory infection? No   6.  Do you have a cough, shortness of breath, or wheezing? No   7.  Do you sometimes get pains in the calves of your legs when you walk? YES - back pain radiates down legs   8. Do you or anyone in your family have previous history of blood clots? YES - venous stasis ulcers and varicose veins, but no dvt   9.  Do you or does anyone in your family have a serious bleeding problem such as prolonged bleeding following surgeries or cuts? No   10. Have you ever had problems with anemia or been told to take iron pills? No   11. Have you had any abnormal blood loss such as black, tarry or bloody stools? No   12. Have you ever had a blood transfusion? No   13. Have you or any of your relatives ever had problems with  anesthesia? No   14. Do you have sleep apnea, excessive snoring or daytime drowsiness? YES - snoring, no diagnosed sleep apnea   15. Do you have any prosthetic heart valves? No   16. Do you have prosthetic joints? No           HPI:                                                      Brief HPI related to upcoming procedure: 57 year old with years of low back pain.  Worse recently.  Going down legs.  To have lumbar procedure.            MEDICAL HISTORY:                                                    Patient Active Problem List    Diagnosis Date Noted     Obesity 02/06/2017     Priority: Medium     Tobacco abuse 04/01/2016     Priority: Medium     Varicose veins 12/14/2015     Priority: Medium     Contusion of bone 11/18/2014     Priority: Medium     Bee sting allergy 09/27/2013     Priority: Medium     Venous insufficiency 04/23/2013     Priority: Medium     Right leg only       Type 2 diabetes, HbA1c goal < 7% (H) 08/02/2012     Priority: Medium     Hyperlipidemia LDL goal <100 08/02/2012     Priority: Medium     Hypertension goal BP (blood pressure) < 140/90 08/02/2012     Priority: Medium     Venous stasis ulcers (H) 07/26/2012     Priority: Medium     Chronic daily headache 03/24/2011     Priority: Medium      Past Medical History:   Diagnosis Date     Arthritis      Chronic daily headache 3/24/2011     ED (erectile dysfunction)      Hyperlipidemia LDL goal <100 8/2/2012     Hypertension goal BP (blood pressure) < 140/90 8/2/2012     Smoker 8/2/2012     Type 2 diabetes, HbA1c goal < 7% (H) 8/2/2012     Venous stasis ulcers (H) 7/26/2012     Past Surgical History:   Procedure Laterality Date     HERNIA REPAIR  1997    bilateral groin     ORTHOPEDIC SURGERY  1977    right shoulder dislocation     Current Outpatient Prescriptions   Medication Sig Dispense Refill     oxyCODONE-acetaminophen (PERCOCET) 5-325 MG per tablet Take 1 tablet by mouth every 6 hours as needed for pain maximum 2 tablet(s) per day 30 tablet  0     amoxicillin-clavulanate (AUGMENTIN) 500-125 MG per tablet Take 1 tablet by mouth 3 times daily (with meals) 15 tablet 0     metFORMIN (GLUCOPHAGE) 1000 MG tablet Take 1 tablet (1,000 mg) by mouth 2 times daily (with meals) 180 tablet 0     glipiZIDE (GLUCOTROL) 5 MG tablet Take 1 tablet (5 mg) by mouth 2 times daily (before meals) 180 tablet 0     blood glucose monitoring (NO BRAND SPECIFIED) test strip Use to test blood sugars 2   times daily or as directed 100 strip 1     nicotine (NICODERM CQ) 21 MG/24HR patch 2h hr Place 1 patch onto the skin every 24 hours 30 patch 2     blood glucose monitoring (NO BRAND SPECIFIED) meter device kit Use to test blood sugar 1 times daily or as directed.    Also refills on strips and lancets, refills for a year 1 kit 11     sildenafil (VIAGRA) 100 MG tablet Take 1 tablet (100 mg) by mouth daily as needed for erectile dysfunction 6 tablet 12     EPINEPHrine (EPIPEN 2-FAITH) 0.3 MG/0.3ML injection Inject 0.3 mLs (0.3 mg) into the muscle once as needed for anaphylaxis 1 each 1     ACE NOT PRESCRIBED, INTENTIONAL, 1 each continuous prn. ACE Inhibitor not prescribed due to Other:questionable SE. Will try again after DM stable. 0 each 0     aspirin 325 MG buffered tablet Take  by mouth daily.       ibuprofen (ADVIL,MOTRIN) 200 MG tablet Take 200 mg by mouth every 4 hours as needed Reported on 4/14/2017     of note, patient is mainly just taking the 2 diabetes meds and the prn pain med currently.      OTC products: None, except as noted above    Allergies   Allergen Reactions     Bee Pollen      Swelling      No Known Drug Allergies       Latex Allergy: NO    Social History   Substance Use Topics     Smoking status: Current Every Day Smoker     Packs/day: 1.00     Types: Cigarettes     Smokeless tobacco: Never Used     Alcohol use Yes      Comment: 1-5 daily     History   Drug Use No       REVIEW OF SYSTEMS:                                                    Constitutional, neuro,  ENT, endocrine, pulmonary, cardiac, gastrointestinal, genitourinary, musculoskeletal, integument and psychiatric systems are negative, except as otherwise noted.    Had sickness a while ago but fine now          EXAM:                                                    /90 (BP Location: Left arm, Patient Position: Chair, Cuff Size: Adult Regular)  Pulse 98  Temp 98  F (36.7  C) (Oral)  Wt 241 lb 4 oz (109.4 kg)  BMI 32.72 kg/m2    GENERAL APPEARANCE: healthy, alert and no distress     HENT: ear canals and TM's normal and nose and mouth without ulcers or lesions     NECK: no adenopathy, no asymmetry, masses, or scars and thyroid normal to palpation     RESP: lungs clear to auscultation - no rales, rhonchi or wheezes     CV: regular rates and rhythm, normal S1 S2, no S3 or S4 and no murmur, click or rub     ABDOMEN:  soft, nontender, no HSM or masses and bowel sounds normal     MS: extremities normal- no gross deformities noted, no evidence of inflammation in joints, FROM in all extremities.     MS: some soreness over low back     SKIN: no suspicious lesions or rashes     NEURO: Normal strength and tone, sensory exam grossly normal, mentation intact and speech normal     PSYCH: mentation appears normal. and affect normal/bright     LYMPHATICS: No axillary, cervical, or supraclavicular nodes    DIAGNOSTICS:                                                    ekg today     Labs drawn, pending    Recent Labs   Lab Test  12/30/16   0700  11/18/16   0945  12/14/15   0719  04/09/13   1427   HGB   --    --   15.2  15.4   PLT   --    --   184  184   NA  134   --   133   --    POTASSIUM  4.3   --   4.2   --    CR  0.85   --   0.78   --    A1C  7.9*  8.4*  10.7*  7.2*        IMPRESSION:                                                    Reason for surgery/procedure: low back pain  Diagnosis/reason for consult: pre-op clearance    The proposed surgical procedure is considered INTERMEDIATE risk.    REVISED CARDIAC RISK  INDEX  The patient has the following serious cardiovascular risks for perioperative complications such as (MI, PE, VFib and 3  AV Block):  No serious cardiac risks  INTERPRETATION: 0 risks: Class I (very low risk - 0.4% complication rate)    The patient has the following additional risks for perioperative complications:  No identified additional risks      ICD-10-CM    1. Preop general physical exam Z01.818 EKG 12-lead, tracing only       RECOMMENDATIONS:                                                      --Consult hospital rounder / IM to assist post-op medical management    --Patient is to take all scheduled medications on the day of surgery EXCEPT for modifications listed below.  Advised patient hold the aspirin for a week prior to procedure    APPROVAL GIVEN to proceed with proposed procedure, without further diagnostic evaluation, providing labs are okay    Of note, patient has never had any heart problems    No current cardiac type symptoms.    Blood pressure high here.  In past he was prescribed lisinopril but never started it.  I did re - prescribe it today.  Start lisinopril 10 mg once daily.  Take early am of procedure.    Hold metformin and glipizide night before and morning of procedure.    Encourage smoking cessation.        Signed Electronically by: Fernando Sutton MD    Copy of this evaluation report is provided to requesting physician.    [facility] Preop Guidelines

## 2017-10-31 NOTE — MR AVS SNAPSHOT
After Visit Summary   10/31/2017    Zachary Chand    MRN: [id number]           Patient Information     Date Of Birth          1960        Visit Information        Provider Department      10/31/2017 6:40 AM Fernando Sutton MD [facility]        Today's Diagnoses     Preop general physical exam    -  1    Low back pain with sciatica, sciatica laterality unspecified, unspecified back pain laterality, unspecified chronicity        Type 2 diabetes mellitus without complication, without long-term current use of insulin (H)        Hypertension goal BP (blood pressure) < 140/90          Care Instructions      Before Your Surgery      Call your surgeon if there is any change in your health. This includes signs of a cold or flu (such as a sore throat, runny nose, cough, rash or fever).    Do not smoke, drink alcohol or take over the counter medicine (unless your surgeon or primary care doctor tells you to) for the 24 hours before and after surgery.    If you take prescribed drugs: Follow your doctor s orders about which medicines to take and which to stop until after surgery.    Eating and drinking prior to surgery: follow the instructions from your surgeon    Take a shower or bath the night before surgery. Use the soap your surgeon gave you to gently clean your skin. If you do not have soap from your surgeon, use your regular soap. Do not shave or scrub the surgery site.  Wear clean pajamas and have clean sheets on your bed.     Hold metformin and glipizide night before and morning of procedure    Work on smoking cessation    Start lisinopril 10 mg once daily, and take this early am of procedure    Hold aspirin one week prior to procedure.  Resume after surgery    See Dr. Sutton in 1-2 months, sooner if needed    We will send you lab results .                   Follow-ups after your visit        Who to contact     If you have questions or need follow up information about today's  "clinic visit or your schedule please contact [facility] directly at [phone].  Normal or non-critical lab and imaging results will be communicated to you by MyChart, letter or phone within 4 business days after the clinic has received the results. If you do not hear from us within 7 days, please contact the clinic through [employer] or phone. If you have a critical or abnormal lab result, we will notify you by phone as soon as possible.  Submit refill requests through [employer] or call your pharmacy and they will forward the refill request to us. Please allow 3 business days for your refill to be completed.          Additional Information About Your Visit        MyChart Information     [employer] lets you send messages to your doctor, view your test results, renew your prescriptions, schedule appointments and more. To sign up, go to [email or web address] . Click on \"Log in\" on the left side of the screen, which will take you to the Welcome page. Then click on \"Sign up Now\" on the right side of the page.     You will be asked to enter the access code listed below, as well as some personal information. Please follow the directions to create your username and password.     Your access code is: [email or web address]  Expires: 2017  3:52 PM     Your access code will  in 90 days. If you need help or a new code, please call your [facility] clinic or [phone].        Care EveryWhere ID     This is your Care EveryWhere ID. This could be used by other organizations to access your [facility] medical records  [id number]        Your Vitals Were     Pulse Temperature BMI (Body Mass Index)             98 98  F (36.7  C) (Oral) 32.72 kg/m2          Blood Pressure from Last 3 Encounters:   10/31/17 152/78   17 122/78   17 141/85    Weight from Last 3 Encounters:   10/31/17 241 lb 4 oz (109.4 kg)   17 240 lb (108.9 kg)   17 239 lb (108.4 kg)              We Performed the Following     " CBC with platelets differential     Comprehensive metabolic panel     EKG 12-lead, tracing only     Hemoglobin A1c          Today's Medication Changes          These changes are accurate as of: 10/31/17  7:22 AM.  If you have any questions, ask your nurse or doctor.               Start taking these medicines.        Dose/Directions    lisinopril 10 MG tablet   Commonly known as:  PRINIVIL/ZESTRIL   Used for:  Hypertension goal BP (blood pressure) < 140/90   Started by:  Fernando Sutton MD        Dose:  10 mg   Take 1 tablet (10 mg) by mouth daily   Quantity:  30 tablet   Refills:  1            Where to get your medicines      These medications were sent to [facility] - [place], [place] - [address]  [address], [facility] [address]     Phone:  [phone]     lisinopril 10 MG tablet                Primary Care Provider Office Phone # Fax #    Fernando Sutton [phone]       [address] [place], [place]. \[facility] Outlook.\"" [id number]        Equal Access to Services     [facility] HospitalROSEANN : Hadii jolanta ku hadasho Soomaali, waaxda luqadaha, qaybta kaalmada adeegyada, waxay idiin hayevonn john kelly . So [facility] [phone].    ATENCIÓN: Si habla español, tiene a quintero disposición servicios gratuitos de asistencia lingüística. Llame al [phone].    We comply with applicable federal civil rights laws and [place] laws. We do not discriminate on the basis of race, color, national origin, age, disability, sex, sexual orientation, or gender identity.            Thank you!     Thank you for choosing [facility]  for your care. Our goal is always to provide you with excellent care. Hearing back from our patients is one way we can continue to improve our services. Please take a few minutes to complete the written survey that you may receive in the mail after your visit with us. Thank you!             Your Updated Medication List - Protect  others around you: Learn how to safely use, store and throw away your medicines at www.disposemymeds.org.          This list is accurate as of: 10/31/17  7:22 AM.  Always use your most recent med list.                   Brand Name Dispense Instructions for use Diagnosis    ACE NOT PRESCRIBED (INTENTIONAL)     0 each    1 each continuous prn. ACE Inhibitor not prescribed due to Other:questionable SE. Will try again after DM stable.    Type 2 diabetes, HbA1c goal < 7% (H)       amoxicillin-clavulanate 500-125 MG per tablet    AUGMENTIN    15 tablet    Take 1 tablet by mouth 3 times daily (with meals)    Preventive antibiotic       aspirin 325 MG buffered tablet      Take  by mouth daily.        blood glucose monitoring meter device kit    no brand specified    1 kit    Use to test blood sugar 1 times daily or as directed.  Also refills on strips and lancets, refills for a year    Type 2 diabetes mellitus without complication (H)       blood glucose monitoring test strip    no brand specified    100 strip    Use to test blood sugars 2  times daily or as directed    Type 2 diabetes mellitus with hyperglycemia, without long-term current use of insulin (H)       EPINEPHrine 0.3 MG/0.3ML injection    EPIPEN 2-FAITH    1 each    Inject 0.3 mLs (0.3 mg) into the muscle once as needed for anaphylaxis    Bee sting allergy       glipiZIDE 5 MG tablet    GLUCOTROL    180 tablet    Take 1 tablet (5 mg) by mouth 2 times daily (before meals)    Type 2 diabetes mellitus with hyperglycemia, without long-term current use of insulin (H)       ibuprofen 200 MG tablet    ADVIL/MOTRIN     Take 200 mg by mouth every 4 hours as needed Reported on 4/14/2017        lisinopril 10 MG tablet    PRINIVIL/ZESTRIL    30 tablet    Take 1 tablet (10 mg) by mouth daily    Hypertension goal BP (blood pressure) < 140/90       metFORMIN 1000 MG tablet    GLUCOPHAGE    180 tablet    Take 1 tablet (1,000 mg) by mouth 2 times daily (with meals)    Type 2  diabetes mellitus with hyperglycemia, without long-term current use of insulin (H)       nicotine 21 MG/24HR 24 hr patch    NICODERM CQ    30 patch    Place 1 patch onto the skin every 24 hours    Smoker       oxyCODONE-acetaminophen 5-325 MG per tablet    PERCOCET    30 tablet    Take 1 tablet by mouth every 6 hours as needed for pain maximum 2 tablet(s) per day    Lumbar radiculopathy, DDD (degenerative disc disease), lumbar       sildenafil 100 MG tablet    VIAGRA    6 tablet    Take 1 tablet (100 mg) by mouth daily as needed for erectile dysfunction    Erectile dysfunction

## 2017-10-31 NOTE — NURSING NOTE
Chief Complaint   Patient presents with     Pre-Op Exam     Health Maintenance     *_* Health Care Directive *_*       Initial /90 (BP Location: Left arm, Patient Position: Chair, Cuff Size: Adult Regular)  Pulse 98  Temp 98  F (36.7  C) (Oral)  Wt 241 lb 4 oz (109.4 kg)  BMI 32.72 kg/m2 Estimated body mass index is 32.72 kg/(m^2) as calculated from the following:    Height as of 8/28/17: 6' (1.829 m).    Weight as of this encounter: 241 lb 4 oz (109.4 kg).  Medication Reconciliation: complete   Sandhya Hernandez CMA

## 2017-10-31 NOTE — LETTER
[facility]  [address] [place]  [address]  [phone]        November 1, 2017    Zachary Chand  [address]  [address]  [place] [address]        Dear Zachary,    Your pre-op labs are okay.  Diabetes test ( hemoglobin a1c ) is better than last time.    Results for orders placed or performed in visit on 10/31/17   Hemoglobin A1c   Result Value Ref Range    Hemoglobin A1C 7.2 (H) 4.3 - 6.0 %   Comprehensive metabolic panel   Result Value Ref Range    Sodium 138 133 - 144 mmol/L    Potassium 4.5 3.4 - 5.3 mmol/L    Chloride 103 94 - 109 mmol/L    Carbon Dioxide 29 20 - 32 mmol/L    Anion Gap 6 3 - 14 mmol/L    Glucose 206 (H) 70 - 99 mg/dL    Urea Nitrogen 12 7 - 30 mg/dL    Creatinine 0.78 0.66 - 1.25 mg/dL    GFR Estimate >90 >60 mL/min/1.7m2    GFR Estimate If Black >90 >60 mL/min/1.7m2    Calcium 9.3 8.5 - 10.1 mg/dL    Bilirubin Total 0.3 0.2 - 1.3 mg/dL    Albumin 3.8 3.4 - 5.0 g/dL    Protein Total 7.9 6.8 - 8.8 g/dL    Alkaline Phosphatase 55 40 - 150 U/L    ALT 36 0 - 70 U/L    AST 13 0 - 45 U/L   CBC with platelets differential   Result Value Ref Range    WBC 6.1 4.0 - 11.0 10e9/L    RBC Count 4.68 4.4 - 5.9 10e12/L    Hemoglobin 15.6 13.3 - 17.7 g/dL    Hematocrit 45.1 40.0 - 53.0 %    MCV 96 78 - 100 fl    MCH 33.3 (H) 26.5 - 33.0 pg    MCHC 34.6 31.5 - 36.5 g/dL    RDW 12.0 10.0 - 15.0 %    Platelet Count 205 150 - 450 10e9/L    Diff Method Automated Method     % Neutrophils 46.8 %    % Lymphocytes 36.5 %    % Monocytes 12.9 %    % Eosinophils 3.3 %    % Basophils 0.5 %    Absolute Neutrophil 2.9 1.6 - 8.3 10e9/L    Absolute Lymphocytes 2.2 0.8 - 5.3 10e9/L    Absolute Monocytes 0.8 0.0 - 1.3 10e9/L    Absolute Eosinophils 0.2 0.0 - 0.7 10e9/L    Absolute Basophils 0.0 0.0 - 0.2 10e9/L       If you have any questions please call the clinic at [phone].    Sincerely,    Fernando VASQUES

## 2017-11-01 NOTE — PROGRESS NOTES
Your pre-op labs are okay.  Diabetes test ( hemoglobin a1c ) is better than last time.    Fernando Sutton MD    TC- please fax to [facility] pre-op  Thanks  Fernando Sutton MD

## 2017-11-16 ENCOUNTER — TELEPHONE (OUTPATIENT)
Dept: FAMILY MEDICINE | Facility: CLINIC | Age: 57
End: 2017-11-16

## 2017-11-16 NOTE — TELEPHONE ENCOUNTER
Panel Management Review      Patient has the following on his problem list:     Diabetes    ASA: Passed    Last A1C  Lab Results   Component Value Date    A1C 7.2 10/31/2017    A1C 7.9 12/30/2016    A1C 8.4 11/18/2016    A1C 10.7 12/14/2015    A1C 7.2 04/09/2013     A1C tested: Passed    Last LDL:    Lab Results   Component Value Date    CHOL 198 12/30/2016     Lab Results   Component Value Date    HDL 29 12/30/2016     Lab Results   Component Value Date     12/30/2016     Lab Results   Component Value Date    TRIG 137 12/30/2016     Lab Results   Component Value Date    CHOLHDLRATIO 6.0 07/20/2012     Lab Results   Component Value Date    NHDL 169 12/30/2016       Is the patient on a Statin? NO             Is the patient on Aspirin? YES    Medications     Salicylates    aspirin 325 MG buffered tablet          Last three blood pressure readings:  BP Readings from Last 3 Encounters:   10/31/17 152/78   09/01/17 122/78   09/01/17 141/85       Date of last diabetes office visit: 10/31/17     Tobacco History:     History   Smoking Status     Current Every Day Smoker     Packs/day: 1.00     Types: Cigarettes   Smokeless Tobacco     Never Used           IVD   ASA: Passed    Last LDL:    Lab Results   Component Value Date    CHOL 198 12/30/2016     Lab Results   Component Value Date    HDL 29 12/30/2016     Lab Results   Component Value Date     12/30/2016     Lab Results   Component Value Date    TRIG 137 12/30/2016        Lab Results   Component Value Date    CHOLHDLRATIO 6.0 07/20/2012        Is the patient on a Statin? NO   Is the patient on Aspirin? YES                  Medications     Salicylates    aspirin 325 MG buffered tablet          Last three blood pressure readings:  BP Readings from Last 3 Encounters:   10/31/17 152/78   09/01/17 122/78   09/01/17 141/85        Tobacco History:     History   Smoking Status     Current Every Day Smoker     Packs/day: 1.00     Types: Cigarettes   Smokeless  Tobacco     Never Used       Hypertension   Last three blood pressure readings:  BP Readings from Last 3 Encounters:   10/31/17 152/78   09/01/17 122/78   09/01/17 141/85     Blood pressure: FAILED    HTN Guidelines:  Age 18-59 BP range:  Less than 140/90  Age 60-85 with Diabetes:  Less than 140/90  Age 60-85 without Diabetes:  less than 150/90            Composite cancer screening  Chart review shows that this patient is due/due soon for the following Fecal Colorectal (FIT)  Summary:    Patient is due/failing the following:   FIT    Action needed:   Patient needs referral/order: FIT ordered 2/22/17    Type of outreach:    Sent letter.    Questions for provider review:    None                                                                                                                                    Kalina See NNAMDI Sol     Chart routed to Care Team .

## 2017-11-16 NOTE — LETTER
December 29, 2017    Zachary Chand  [address] [place] [place] [address]      Dear Zachary Chand,     We have tried to contact you about your health, but have been unable to reach you.  Please call us as soon as possible so we can provide you with the best care possible.  We will continue to check in with you throughout the year to complete these items of care, if you are not able to complete these items at this time.  If you would like to complete the missing items for your care, please contact us at [phone].    We recommend the following:  -complete a FIT TEST a FIT test or Fecal Immunochemical Occult Blood Test is a take home stool sample kit.  It does not replace the colonoscopy for colorectal cancer screening, but it can detect hidden bleeding in the lower colon.  It does need to be repeated every year and if a positive result is obtained, you would be referred for a colonoscopy.  If you have completed either one of these tests at another facility, please have the records sent to our clinic so that we can best coordinate your care.    Sincerely,     Your Care Team at [place]

## 2017-11-28 ENCOUNTER — TRANSFERRED RECORDS (OUTPATIENT)
Dept: HEALTH INFORMATION MANAGEMENT | Facility: CLINIC | Age: 57
End: 2017-11-28

## 2017-12-12 ENCOUNTER — TRANSFERRED RECORDS (OUTPATIENT)
Dept: HEALTH INFORMATION MANAGEMENT | Facility: CLINIC | Age: 57
End: 2017-12-12

## 2017-12-26 ENCOUNTER — TRANSFERRED RECORDS (OUTPATIENT)
Dept: HEALTH INFORMATION MANAGEMENT | Facility: CLINIC | Age: 57
End: 2017-12-26

## 2017-12-29 NOTE — TELEPHONE ENCOUNTER
Called pt and was unable to reach pt. Left  for pt to return call. Final letter sent.  Kalina See NNAMDI Sol

## 2018-01-02 ENCOUNTER — TELEPHONE (OUTPATIENT)
Dept: FAMILY MEDICINE | Facility: CLINIC | Age: 58
End: 2018-01-02

## 2018-01-02 DIAGNOSIS — M54.16 LUMBAR RADICULOPATHY: ICD-10-CM

## 2018-01-02 DIAGNOSIS — E11.65 TYPE 2 DIABETES MELLITUS WITH HYPERGLYCEMIA, WITHOUT LONG-TERM CURRENT USE OF INSULIN (H): ICD-10-CM

## 2018-01-02 DIAGNOSIS — M51.369 DDD (DEGENERATIVE DISC DISEASE), LUMBAR: ICD-10-CM

## 2018-01-02 RX ORDER — OXYCODONE AND ACETAMINOPHEN 5; 325 MG/1; MG/1
1 TABLET ORAL EVERY 6 HOURS PRN
Qty: 30 TABLET | Refills: 0 | Status: CANCELLED | OUTPATIENT
Start: 2018-01-02

## 2018-01-02 NOTE — TELEPHONE ENCOUNTER
Requested Prescriptions   Pending Prescriptions Disp Refills     metFORMIN (GLUCOPHAGE) 1000 MG tablet [Pharmacy Med Name: METFORMIN HCL 1000MG TABS] 180 tablet 0    Last Written Prescription Date:  8-21-17  Last Fill Quantity: 180,  # refills: 0   Last Office Visit with NARDA, ANDRÉS or [place] prescribing provider:  10-31-17   Future Office Visit:      Sig: TAKE ONE TABLET BY MOUTH TWICE A DAY WITH MEALS    Biguanide Agents Failed    1/2/2018  8:39 AM       Failed - Patient's BP is less than 140/90    BP Readings from Last 3 Encounters:   10/31/17 152/78   09/01/17 122/78   09/01/17 141/85                Failed - Patient has documented LDL within the past 12 mos.    Recent Labs   Lab Test  12/30/16   0700   LDL  142*            Failed - Patient has had a Microalbumin in the past 12 mos.    Recent Labs   Lab Test  12/30/16   0751   MICROL  21   UMALCR  13.01            Passed - Patient is age 10 or older       Passed - Patient has documented A1c within the specified period of time.    Recent Labs   Lab Test  10/31/17   0729   A1C  7.2*            Passed - Patient's CR is NOT>1.4 OR Patient's EGFR is NOT<45 within past 12 mos.    Recent Labs   Lab Test  10/31/17   0729   GFRESTIMATED  >90   GFRESTBLACK  >90       Recent Labs   Lab Test  10/31/17   0729   CR  0.78            Passed - Patient does NOT have a diagnosis of CHF.       Passed - Recent (6 mos) or future visit with authorizing provider's specialty    Patient had office visit in the last 6 months or has a visit in the next 30 days with authorizing provider.  See chart review.

## 2018-01-03 NOTE — TELEPHONE ENCOUNTER
Routing refill request to provider for review/approval because:  Labs out of range  Overdue for lipids  BP not at goal.    Clare Spears, RN [facility] Triage.

## 2018-01-03 NOTE — TELEPHONE ENCOUNTER
I refilled metformin  He is overdue for OV for HTN. Please help him to schedule. Come fasting if able as he's due for fasting lipids

## 2018-01-08 ENCOUNTER — OFFICE VISIT (OUTPATIENT)
Dept: FAMILY MEDICINE | Facility: CLINIC | Age: 58
End: 2018-01-08
Payer: COMMERCIAL

## 2018-01-08 ENCOUNTER — TELEPHONE (OUTPATIENT)
Dept: FAMILY MEDICINE | Facility: CLINIC | Age: 58
End: 2018-01-08

## 2018-01-08 VITALS
TEMPERATURE: 97.9 F | HEIGHT: 72 IN | WEIGHT: 235 LBS | BODY MASS INDEX: 31.83 KG/M2 | OXYGEN SATURATION: 100 % | DIASTOLIC BLOOD PRESSURE: 84 MMHG | HEART RATE: 96 BPM | SYSTOLIC BLOOD PRESSURE: 135 MMHG

## 2018-01-08 DIAGNOSIS — I10 HYPERTENSION GOAL BP (BLOOD PRESSURE) < 140/90: ICD-10-CM

## 2018-01-08 DIAGNOSIS — T81.89XA DELAYED SURGICAL WOUND HEALING, INITIAL ENCOUNTER: ICD-10-CM

## 2018-01-08 DIAGNOSIS — M51.369 DDD (DEGENERATIVE DISC DISEASE), LUMBAR: ICD-10-CM

## 2018-01-08 DIAGNOSIS — E11.65 TYPE 2 DIABETES MELLITUS WITH HYPERGLYCEMIA, WITHOUT LONG-TERM CURRENT USE OF INSULIN (H): ICD-10-CM

## 2018-01-08 DIAGNOSIS — Z12.11 SPECIAL SCREENING FOR MALIGNANT NEOPLASMS, COLON: Primary | ICD-10-CM

## 2018-01-08 DIAGNOSIS — M54.16 LUMBAR RADICULOPATHY: ICD-10-CM

## 2018-01-08 LAB
ANION GAP SERPL CALCULATED.3IONS-SCNC: 10 MMOL/L (ref 3–14)
BUN SERPL-MCNC: 9 MG/DL (ref 7–30)
CALCIUM SERPL-MCNC: 9.3 MG/DL (ref 8.5–10.1)
CHLORIDE SERPL-SCNC: 102 MMOL/L (ref 94–109)
CHOLEST SERPL-MCNC: 200 MG/DL
CO2 SERPL-SCNC: 25 MMOL/L (ref 20–32)
CREAT SERPL-MCNC: 0.78 MG/DL (ref 0.66–1.25)
CREAT UR-MCNC: 196 MG/DL
GFR SERPL CREATININE-BSD FRML MDRD: >90 ML/MIN/1.7M2
GLUCOSE SERPL-MCNC: 166 MG/DL (ref 70–99)
HDLC SERPL-MCNC: 42 MG/DL
LDLC SERPL CALC-MCNC: 127 MG/DL
MICROALBUMIN UR-MCNC: 12 MG/L
MICROALBUMIN/CREAT UR: 6.33 MG/G CR (ref 0–17)
NONHDLC SERPL-MCNC: 158 MG/DL
POTASSIUM SERPL-SCNC: 4.9 MMOL/L (ref 3.4–5.3)
SODIUM SERPL-SCNC: 137 MMOL/L (ref 133–144)
TRIGL SERPL-MCNC: 157 MG/DL

## 2018-01-08 PROCEDURE — 99214 OFFICE O/P EST MOD 30 MIN: CPT | Performed by: NURSE PRACTITIONER

## 2018-01-08 PROCEDURE — 80061 LIPID PANEL: CPT | Performed by: NURSE PRACTITIONER

## 2018-01-08 PROCEDURE — 80048 BASIC METABOLIC PNL TOTAL CA: CPT | Performed by: NURSE PRACTITIONER

## 2018-01-08 PROCEDURE — 82043 UR ALBUMIN QUANTITATIVE: CPT | Performed by: NURSE PRACTITIONER

## 2018-01-08 PROCEDURE — 99207 C FOOT EXAM  NO CHARGE: CPT | Mod: 25 | Performed by: NURSE PRACTITIONER

## 2018-01-08 PROCEDURE — 36415 COLL VENOUS BLD VENIPUNCTURE: CPT | Performed by: NURSE PRACTITIONER

## 2018-01-08 RX ORDER — ATORVASTATIN CALCIUM 20 MG/1
20 TABLET, FILM COATED ORAL DAILY
Qty: 90 TABLET | Refills: 3 | Status: SHIPPED | OUTPATIENT
Start: 2018-01-08 | End: 2018-11-12

## 2018-01-08 RX ORDER — LISINOPRIL 10 MG/1
10 TABLET ORAL DAILY
Qty: 90 TABLET | Refills: 3 | Status: SHIPPED | OUTPATIENT
Start: 2018-01-08 | End: 2018-11-12

## 2018-01-08 RX ORDER — GLIPIZIDE 5 MG/1
5 TABLET ORAL
Qty: 180 TABLET | Refills: 0 | Status: SHIPPED | OUTPATIENT
Start: 2018-01-08 | End: 2018-05-16

## 2018-01-08 RX ORDER — OXYCODONE AND ACETAMINOPHEN 5; 325 MG/1; MG/1
1 TABLET ORAL EVERY 6 HOURS PRN
Qty: 10 TABLET | Refills: 0 | Status: SHIPPED | OUTPATIENT
Start: 2018-01-08 | End: 2018-11-12

## 2018-01-08 ASSESSMENT — PAIN SCALES - GENERAL: PAINLEVEL: NO PAIN (0)

## 2018-01-08 NOTE — MR AVS SNAPSHOT
"              After Visit Summary   1/8/2018    Zachary Chand    MRN: [id number]           Patient Information     Date Of Birth          1960        Visit Information        Provider Department      1/8/2018 8:20 AM Sara Hernandes APRN CNP [facility]        Today's Diagnoses     Special screening for malignant neoplasms, colon    -  1    Hypertension goal BP (blood pressure) < 140/90        Type 2 diabetes mellitus with hyperglycemia, without long-term current use of insulin (H)           Follow-ups after your visit        Future tests that were ordered for you today     Open Future Orders        Priority Expected Expires Ordered    Fecal colorectal cancer screen (FIT) Routine 1/29/2018 4/2/2018 1/8/2018            Who to contact     If you have questions or need follow up information about today's clinic visit or your schedule please contact [facility] directly at [phone].  Normal or non-critical lab and imaging results will be communicated to you by MyChart, letter or phone within 4 business days after the clinic has received the results. If you do not hear from us within 7 days, please contact the clinic through MyChart or phone. If you have a critical or abnormal lab result, we will notify you by phone as soon as possible.  Submit refill requests through Listen Up or call your pharmacy and they will forward the refill request to us. Please allow 3 business days for your refill to be completed.          Additional Information About Your Visit        MyChart Information     Listen Up lets you send messages to your doctor, view your test results, renew your prescriptions, schedule appointments and more. To sign up, go to [email or web address] . Click on \"Log in\" on the left side of the screen, which will take you to the Welcome page. Then click on \"Sign up Now\" on the right side of the page.     You will be asked to enter the access code listed below, " as well as some personal information. Please follow the directions to create your username and password.     Your access code is: [email or web address]  Expires: 2018  8:53 AM     Your access code will  in 90 days. If you need help or a new code, please call your [facility] clinic or [phone].        Care EveryWhere ID     This is your Care EveryWhere ID. This could be used by other organizations to access your [facility] medical records  [id number]        Your Vitals Were     Pulse Temperature Height Pulse Oximetry BMI (Body Mass Index)       96 97.9  F (36.6  C) (Oral) 6' (1.829 m) 100% 31.87 kg/m2        Blood Pressure from Last 3 Encounters:   18 135/84   10/31/17 152/78   17 122/78    Weight from Last 3 Encounters:   18 235 lb (106.6 kg)   10/31/17 241 lb 4 oz (109.4 kg)   17 240 lb (108.9 kg)              We Performed the Following     Albumin Random Urine Quantitative with Creat Ratio     Basic metabolic panel     Lipid panel reflex to direct LDL Fasting          Today's Medication Changes          These changes are accurate as of: 18  8:53 AM.  If you have any questions, ask your nurse or doctor.               Start taking these medicines.        Dose/Directions    aspirin 81 MG EC tablet   Used for:  Type 2 diabetes mellitus with hyperglycemia, without long-term current use of insulin (H)   Started by:  Sara Hernandes APRN CNP        Dose:  81 mg   Take 1 tablet (81 mg) by mouth daily   Quantity:  90 tablet   Refills:  3       atorvastatin 20 MG tablet   Commonly known as:  LIPITOR   Used for:  Type 2 diabetes mellitus with hyperglycemia, without long-term current use of insulin (H)   Started by:  Sara Hernandes APRN CNP        Dose:  20 mg   Take 1 tablet (20 mg) by mouth daily   Quantity:  90 tablet   Refills:  3         These medicines have changed or have updated prescriptions.        Dose/Directions    metFORMIN 1000 MG tablet   Commonly known as:   GLUCOPHAGE   This may have changed:  See the new instructions.   Used for:  Type 2 diabetes mellitus with hyperglycemia, without long-term current use of insulin (H)   Changed by:  Sara Hernandes APRN CNP        Dose:  1000 mg   Take 1 tablet (1,000 mg) by mouth 2 times daily (with meals)   Quantity:  180 tablet   Refills:  0         Stop taking these medicines if you haven't already. Please contact your care team if you have questions.     aspirin 325 MG buffered tablet   Stopped by:  Sara Hernandes APRN CNP                Where to get your medicines      These medications were sent to [facility] - [place], [place] - [address]  [address], [facility] [address]     Phone:  [phone]     aspirin 81 MG EC tablet    atorvastatin 20 MG tablet    glipiZIDE 5 MG tablet    lisinopril 10 MG tablet    metFORMIN 1000 MG tablet                Primary Care Provider Office Phone # Fax #    Fernando Sutton [phone]       [address] [facility] [address]        Equal Access to Services     [facility]: Hadii jolanta ku hadasho Soomaali, waaxda luqadaha, qaybta kaalmada adeegyada, waxay idiin hayesthela kelly . So [facility] [phone].    ATENCIÓN: Si habla español, tiene a quintero disposición servicios gratuitos de asistencia lingüística. Llame al [phone].    We comply with applicable federal civil rights laws and [place] laws. We do not discriminate on the basis of race, color, national origin, age, disability, sex, sexual orientation, or gender identity.            Thank you!     Thank you for choosing [facility]  for your care. Our goal is always to provide you with excellent care. Hearing back from our patients is one way we can continue to improve our services. Please take a few minutes to complete the written survey that you may receive in the mail after your visit with us. Thank  you!             Your Updated Medication List - Protect others around you: Learn how to safely use, store and throw away your medicines at www.disposemymeds.org.          This list is accurate as of: 1/8/18  8:53 AM.  Always use your most recent med list.                   Brand Name Dispense Instructions for use Diagnosis    aspirin 81 MG EC tablet     90 tablet    Take 1 tablet (81 mg) by mouth daily    Type 2 diabetes mellitus with hyperglycemia, without long-term current use of insulin (H)       atorvastatin 20 MG tablet    LIPITOR    90 tablet    Take 1 tablet (20 mg) by mouth daily    Type 2 diabetes mellitus with hyperglycemia, without long-term current use of insulin (H)       blood glucose monitoring meter device kit    no brand specified    1 kit    Use to test blood sugar 1 times daily or as directed.  Also refills on strips and lancets, refills for a year    Type 2 diabetes mellitus without complication (H)       blood glucose monitoring test strip    no brand specified    100 strip    Use to test blood sugars 2  times daily or as directed    Type 2 diabetes mellitus with hyperglycemia, without long-term current use of insulin (H)       EPINEPHrine 0.3 MG/0.3ML injection    EPIPEN 2-FAITH    1 each    Inject 0.3 mLs (0.3 mg) into the muscle once as needed for anaphylaxis    Bee sting allergy       glipiZIDE 5 MG tablet    GLUCOTROL    180 tablet    Take 1 tablet (5 mg) by mouth 2 times daily (before meals)    Type 2 diabetes mellitus with hyperglycemia, without long-term current use of insulin (H)       ibuprofen 200 MG tablet    ADVIL/MOTRIN     Take 200 mg by mouth every 4 hours as needed Reported on 4/14/2017        lisinopril 10 MG tablet    PRINIVIL/ZESTRIL    90 tablet    Take 1 tablet (10 mg) by mouth daily    Hypertension goal BP (blood pressure) < 140/90       metFORMIN 1000 MG tablet    GLUCOPHAGE    180 tablet    Take 1 tablet (1,000 mg) by mouth 2 times daily (with meals)    Type 2 diabetes  mellitus with hyperglycemia, without long-term current use of insulin (H)       nicotine 21 MG/24HR 24 hr patch    NICODERM CQ    30 patch    Place 1 patch onto the skin every 24 hours    Smoker       oxyCODONE-acetaminophen 5-325 MG per tablet    PERCOCET    30 tablet    Take 1 tablet by mouth every 6 hours as needed for pain maximum 2 tablet(s) per day    Lumbar radiculopathy, DDD (degenerative disc disease), lumbar       sildenafil 100 MG tablet    VIAGRA    6 tablet    Take 1 tablet (100 mg) by mouth daily as needed for erectile dysfunction    Erectile dysfunction

## 2018-01-08 NOTE — PROGRESS NOTES
SUBJECTIVE:   Zachary Chand is a 57 year old male who presents to clinic today for the following health issues:    Hypertension Follow-up      Outpatient blood pressures are not being checked.    Low Salt Diet: low salt    Amount of exercise or physical activity: None    Problems taking medications regularly: Yes,  Not taking his lisinopril    Medication side effects: none  Diet: regular (no restrictions) and low salt    He has not been taking lisinopril  Only taking metformin and glipizide  (Though with last refill for glipizide he should have run out over 1 month ago)  Not checking blood sugars  Not on aspirin or statin  He is doing physical therapy s/p lumbar surgery  Gradually increasing physical activity  Wants to get back to work        Problem list and histories reviewed & adjusted, as indicated.  Additional history: none    Patient Active Problem List   Diagnosis     Chronic daily headache     Venous stasis ulcers (H)     Type 2 diabetes, HbA1c goal < 7% (H)     Hyperlipidemia LDL goal <100     Hypertension goal BP (blood pressure) < 140/90     Venous insufficiency     Bee sting allergy     Contusion of bone     Varicose veins     Tobacco abuse     Obesity     Type 2 diabetes mellitus without complication, without long-term current use of insulin (H)     Past Surgical History:   Procedure Laterality Date     HERNIA REPAIR  1997    bilateral groin     ORTHOPEDIC SURGERY  1977    right shoulder dislocation       Social History   Substance Use Topics     Smoking status: Current Every Day Smoker     Packs/day: 1.00     Types: Cigarettes     Smokeless tobacco: Never Used     Alcohol use Yes      Comment: 1-5 daily     Family History   Problem Relation Age of Onset     Alcohol/Drug Father          Recent Labs   Lab Test  10/31/17   0729  12/30/16   0700  11/18/16   0945  12/14/15   0719  04/09/13   1427   07/20/12   0735   A1C  7.2*  7.9*  8.4*  10.7*  7.2*   < >   --    LDL   --   142*   --    --    --    --    109   HDL   --   29*   --    --    --    --   34*   TRIG   --   137   --    --    --    --   301*   ALT  36  24   --   45   --    --   34   CR  0.78  0.85   --   0.78   --    --   0.83   GFRESTIMATED  >90  >90  Non African American GFR Calc     --   >90  Non  GFR Calc     --    --   >90   GFRESTBLACK  >90  >90  African American GFR Calc     --   >90   GFR Calc     --    --   >90   POTASSIUM  4.5  4.3   --   4.2   --    --   4.1   TSH   --    --    --   1.50  2.56   --    --     < > = values in this interval not displayed.            Reviewed and updated as needed this visit by clinical staffTobacco  Allergies  Meds  Med Hx  Surg Hx  Fam Hx  Soc Hx      Reviewed and updated as needed this visit by Provider         ROS:  Constitutional, HEENT, cardiovascular, pulmonary, gi and gu systems are negative, except as otherwise noted.      OBJECTIVE:   /84 (BP Location: Right arm, Patient Position: Chair, Cuff Size: Adult Large)  Pulse 96  Temp 97.9  F (36.6  C) (Oral)  Ht 6' (1.829 m)  Wt 235 lb (106.6 kg)  SpO2 100%  BMI 31.87 kg/m2  Body mass index is 31.87 kg/(m^2).  GENERAL: healthy, alert and no distress  RESP: lungs clear to auscultation - no rales, rhonchi or wheezes  CV: regular rate and rhythm, normal S1 S2, no S3 or S4, no murmur, click or rub, no peripheral edema and peripheral pulses strong  SKIN: Large yellow scab (approximatley 2 cm round) over surgical incision with small area (approximately 2 mm) where surgical incision edges are not approximated  Diabetic foot exam: normal DP and PT pulses, no trophic changes or ulcerative lesions and normal sensory exam    Diagnostic Test Results:  none     ASSESSMENT/PLAN:   1. Hypertension goal BP (blood pressure) < 140/90  - BP within goal today. Should be on ACE for renal protective effects. Will resume previous dose  - lisinopril (PRINIVIL/ZESTRIL) 10 MG tablet; Take 1 tablet (10 mg) by mouth daily  Dispense: 90  tablet; Refill: 3  - Lipid panel reflex to direct LDL Fasting  - Albumin Random Urine Quantitative with Creat Ratio  - Basic metabolic panel    2. Type 2 diabetes mellitus with hyperglycemia, without long-term current use of insulin (H)  - Improved with last A1c 7.2. Discussed goal < 7%. Spent much time teaching on importance of diabetes control in reducing CVD risk. Discussed 5 goals for diabetics. Will start statin and asa.   - metFORMIN (GLUCOPHAGE) 1000 MG tablet; Take 1 tablet (1,000 mg) by mouth 2 times daily (with meals)  Dispense: 180 tablet; Refill: 0  - glipiZIDE (GLUCOTROL) 5 MG tablet; Take 1 tablet (5 mg) by mouth 2 times daily (before meals)  Dispense: 180 tablet; Refill: 0  - aspirin 81 MG EC tablet; Take 1 tablet (81 mg) by mouth daily  Dispense: 90 tablet; Refill: 3  - atorvastatin (LIPITOR) 20 MG tablet; Take 1 tablet (20 mg) by mouth daily  Dispense: 90 tablet; Refill: 3    3. Special screening for malignant neoplasms, colon  - Fecal colorectal cancer screen (FIT); Future    4. Delayed surgical wound healing, initial encounter  - Does not look infection, but he is at risk for infection. He is nearly 2 months s/p lumbar 3-4 laminectomy. I am concerned for delayed wound healing. Advised to follow up with surgeon    BALDOMERO Herrera [facility]

## 2018-01-08 NOTE — TELEPHONE ENCOUNTER
Requested Prescriptions   Pending Prescriptions Disp Refills     oxyCODONE-acetaminophen (PERCOCET) 5-325 MG per tablet 30 tablet 0     Sig: Take 1 tablet by mouth every 6 hours as needed for pain maximum 2 tablet(s) per day    There is no refill protocol information for this order         Last Written Prescription Date:  10-26-17  Last Fill Quantity: 30,   # refills: 0  Last Office Visit: 1-8-18  Future Office visit:       Routing refill request to provider for review/approval because:  Drug not on the [facility], P or [facility] refill protocol or controlled substance

## 2018-01-08 NOTE — TELEPHONE ENCOUNTER
Okayed ten pills only    Advise clinic appointment soon if pain not resolving    Please inform patient    Fernando Sutton MD

## 2018-01-08 NOTE — LETTER
[facility]  [address] [place]  [address]  [phone]        January 9, 2018    Zachary Chand  [address] [facility]  [address]  [place] [address]        Dear Zachary,    The results of your recent labs are enclosed.  Your urine and blood tests for kidney function were within normal ranges.  Your cholesterol is elevated. This should improve now that we started you on a statin (Lipitor).  Your cholesterol and blood sugar should also improve as you're gradually able to increase your physical activity.       Please call the clinic if you have any concerns.    Results for orders placed or performed in visit on 01/08/18   Lipid panel reflex to direct LDL Fasting   Result Value Ref Range    Cholesterol 200 (H) <200 mg/dL    Triglycerides 157 (H) <150 mg/dL    HDL Cholesterol 42 >39 mg/dL    LDL Cholesterol Calculated 127 (H) <100 mg/dL    Non HDL Cholesterol 158 (H) <130 mg/dL   Albumin Random Urine Quantitative with Creat Ratio   Result Value Ref Range    Creatinine Urine 196 mg/dL    Albumin Urine mg/L 12 mg/L    Albumin Urine mg/g Cr 6.33 0 - 17 mg/g Cr   Basic metabolic panel   Result Value Ref Range    Sodium 137 133 - 144 mmol/L    Potassium 4.9 3.4 - 5.3 mmol/L    Chloride 102 94 - 109 mmol/L    Carbon Dioxide 25 20 - 32 mmol/L    Anion Gap 10 3 - 14 mmol/L    Glucose 166 (H) 70 - 99 mg/dL    Urea Nitrogen 9 7 - 30 mg/dL    Creatinine 0.78 0.66 - 1.25 mg/dL    GFR Estimate >90 >60 mL/min/1.7m2    GFR Estimate If Black >90 >60 mL/min/1.7m2    Calcium 9.3 8.5 - 10.1 mg/dL       If you have any questions please call the clinic at [phone].    Sincerely,    Sara ALEXANDRE CNP  LMD

## 2018-01-09 NOTE — TELEPHONE ENCOUNTER
Prescription of oxyCODONE-acetaminophen (PERCOCET) 5-325 MG per tablet was given to  Pharmacy.  Spoke with patient and informed of provider note below.

## 2018-01-09 NOTE — PROGRESS NOTES
[address] [place] [address]  Phone: [phone]  Fax: [phone]      01/09/18    Zachary Chand  [address]  [address]  [place] [address]      Dear Zachary,    The results of your recent labs are enclosed.  Your urine and blood tests for kidney function were within normal ranges.  Your cholesterol is elevated. This should improve now that we started you on a statin (Lipitor).  Your cholesterol and blood sugar should also improve as you're gradually able to increase your physical activity.       Please call the clinic if you have any concerns.    Sincerely,    BALDOMERO Herrera CNP    Your [facility] Care Team

## 2018-03-02 ENCOUNTER — TELEPHONE (OUTPATIENT)
Dept: GENERAL RADIOLOGY | Facility: CLINIC | Age: 58
End: 2018-03-02

## 2018-04-05 ENCOUNTER — TELEPHONE (OUTPATIENT)
Dept: GENERAL RADIOLOGY | Facility: CLINIC | Age: 58
End: 2018-04-05

## 2018-04-05 NOTE — TELEPHONE ENCOUNTER
4-5-18 Called and left message to complete and return Fit test      [facility] X-ray tech

## 2018-08-13 DIAGNOSIS — E11.65 TYPE 2 DIABETES MELLITUS WITH HYPERGLYCEMIA, WITHOUT LONG-TERM CURRENT USE OF INSULIN (H): ICD-10-CM

## 2018-08-13 NOTE — TELEPHONE ENCOUNTER
Reason for Call:  Medication or medication refill:    Do you use a [facility]?  Name of the pharmacy and phone number for the current request:  [facility] [address], [place] Phone: [phone].    Name of the medication requested: MetFORMIN (GLUCOPHAGE) 1000 MG tablet    Other request: Patient requested that since he move, from now on his refills be sent to [facility] located at [address] in [place] / [facility] Phone [phone].  Patient also stated that he is out of medication and they gave him a three-day supply but he does not have anymore medication and he would appreciate this refill as soon as possible.    Can we leave a detailed message on this number? YES    Phone number patient can be reached at: Cell number on file:    Telephone Information:   Mobile [phone]       Best Time: Anytime    Call taken on 8/13/2018 at 12:17 PM by Roseline Gandhi

## 2018-08-13 NOTE — TELEPHONE ENCOUNTER
"Requested Prescriptions   Pending Prescriptions Disp Refills     metFORMIN (GLUCOPHAGE) 1000 MG tablet 180 tablet 0     Sig: Take 1 tablet (1,000 mg) by mouth 2 times daily (with meals)    Biguanide Agents Failed    8/13/2018 12:28 PM       Failed - Blood pressure less than 140/90 in past 6 months    BP Readings from Last 3 Encounters:   01/08/18 135/84   10/31/17 152/78   09/01/17 122/78                Failed - Patient has documented A1c within the specified period of time.    If HgbA1C is 8 or greater, it needs to be on file within the past 3 months.  If less than 8, must be on file within the past 6 months.     Recent Labs   Lab Test  10/31/17   0729   A1C  7.2*            Failed - Recent (6 mo) or future (30 days) visit within the authorizing provider's specialty    Patient had office visit in the last 6 months or has a visit in the next 30 days with authorizing provider or within the authorizing provider's specialty.  See \"Patient Info\" tab in inbasket, or \"Choose Columns\" in Meds & Orders section of the refill encounter.           Passed - Patient has documented LDL within the past 12 mos.    Recent Labs   Lab Test  01/08/18   0858   LDL  127*            Passed - Patient has had a Microalbumin in the past 12 mos.    Recent Labs   Lab Test  01/08/18   0910   MICROL  12   UMALCR  6.33            Passed - Patient is age 10 or older       Passed - Patient's CR is NOT>1.4 OR Patient's EGFR is NOT<45 within past 12 mos.    Recent Labs   Lab Test  01/08/18   0858   GFRESTIMATED  >90   GFRESTBLACK  >90       Recent Labs   Lab Test  01/08/18   0858   CR  0.78            Passed - Patient does NOT have a diagnosis of CHF.        Routing refill request to provider for review/approval because:  Failed protocols        Azeb Mac RN  [facility]      "

## 2018-08-14 NOTE — TELEPHONE ENCOUNTER
Okayed refill but see us in the next couple months    Please inform patient    Fernando Sutton MD

## 2018-11-12 ENCOUNTER — OFFICE VISIT (OUTPATIENT)
Dept: FAMILY MEDICINE | Facility: CLINIC | Age: 58
End: 2018-11-12
Payer: COMMERCIAL

## 2018-11-12 ENCOUNTER — RADIANT APPOINTMENT (OUTPATIENT)
Dept: GENERAL RADIOLOGY | Facility: CLINIC | Age: 58
End: 2018-11-12
Attending: INTERNAL MEDICINE
Payer: COMMERCIAL

## 2018-11-12 ENCOUNTER — ALLIED HEALTH/NURSE VISIT (OUTPATIENT)
Dept: NURSING | Facility: CLINIC | Age: 58
End: 2018-11-12
Payer: COMMERCIAL

## 2018-11-12 VITALS
SYSTOLIC BLOOD PRESSURE: 170 MMHG | TEMPERATURE: 98.1 F | RESPIRATION RATE: 16 BRPM | DIASTOLIC BLOOD PRESSURE: 109 MMHG | HEART RATE: 95 BPM | OXYGEN SATURATION: 95 %

## 2018-11-12 DIAGNOSIS — E78.5 HYPERLIPIDEMIA LDL GOAL <100: ICD-10-CM

## 2018-11-12 DIAGNOSIS — E11.9 TYPE 2 DIABETES MELLITUS WITHOUT COMPLICATION, WITHOUT LONG-TERM CURRENT USE OF INSULIN (H): ICD-10-CM

## 2018-11-12 DIAGNOSIS — M62.81 GENERALIZED MUSCLE WEAKNESS: ICD-10-CM

## 2018-11-12 DIAGNOSIS — E11.65 TYPE 2 DIABETES MELLITUS WITH HYPERGLYCEMIA, WITHOUT LONG-TERM CURRENT USE OF INSULIN (H): ICD-10-CM

## 2018-11-12 DIAGNOSIS — C34.31 MALIGNANT NEOPLASM OF LOWER LOBE OF RIGHT LUNG (H): ICD-10-CM

## 2018-11-12 DIAGNOSIS — R07.81 RIB PAIN ON RIGHT SIDE: Primary | ICD-10-CM

## 2018-11-12 DIAGNOSIS — R07.9 CHEST PAIN: Primary | ICD-10-CM

## 2018-11-12 DIAGNOSIS — R10.11 ABDOMINAL PAIN, RIGHT UPPER QUADRANT: ICD-10-CM

## 2018-11-12 DIAGNOSIS — R07.81 RIB PAIN ON RIGHT SIDE: ICD-10-CM

## 2018-11-12 DIAGNOSIS — I10 HYPERTENSION GOAL BP (BLOOD PRESSURE) < 140/90: ICD-10-CM

## 2018-11-12 DIAGNOSIS — Z12.11 SPECIAL SCREENING FOR MALIGNANT NEOPLASMS, COLON: ICD-10-CM

## 2018-11-12 LAB
ALBUMIN SERPL-MCNC: 4 G/DL (ref 3.4–5)
ALP SERPL-CCNC: 62 U/L (ref 40–150)
ALT SERPL W P-5'-P-CCNC: 31 U/L (ref 0–70)
AMYLASE SERPL-CCNC: 35 U/L (ref 30–110)
ANION GAP SERPL CALCULATED.3IONS-SCNC: 8 MMOL/L (ref 3–14)
AST SERPL W P-5'-P-CCNC: 18 U/L (ref 0–45)
BASOPHILS # BLD AUTO: 0 10E9/L (ref 0–0.2)
BASOPHILS NFR BLD AUTO: 0.6 %
BILIRUB DIRECT SERPL-MCNC: 0.1 MG/DL (ref 0–0.2)
BILIRUB SERPL-MCNC: 0.3 MG/DL (ref 0.2–1.3)
BUN SERPL-MCNC: 8 MG/DL (ref 7–30)
CALCIUM SERPL-MCNC: 8.8 MG/DL (ref 8.5–10.1)
CHLORIDE SERPL-SCNC: 99 MMOL/L (ref 94–109)
CK SERPL-CCNC: 152 U/L (ref 30–300)
CO2 SERPL-SCNC: 25 MMOL/L (ref 20–32)
CREAT SERPL-MCNC: 0.78 MG/DL (ref 0.66–1.25)
DIFFERENTIAL METHOD BLD: ABNORMAL
EOSINOPHIL # BLD AUTO: 0.1 10E9/L (ref 0–0.7)
EOSINOPHIL NFR BLD AUTO: 1.6 %
ERYTHROCYTE [DISTWIDTH] IN BLOOD BY AUTOMATED COUNT: 11.5 % (ref 10–15)
ERYTHROCYTE [SEDIMENTATION RATE] IN BLOOD BY WESTERGREN METHOD: 7 MM/H (ref 0–20)
GFR SERPL CREATININE-BSD FRML MDRD: >90 ML/MIN/1.7M2
GLUCOSE SERPL-MCNC: 255 MG/DL (ref 70–99)
HBA1C MFR BLD: 9.6 % (ref 0–5.6)
HCT VFR BLD AUTO: 45.7 % (ref 40–53)
HGB BLD-MCNC: 16.1 G/DL (ref 13.3–17.7)
LIPASE SERPL-CCNC: 157 U/L (ref 73–393)
LYMPHOCYTES # BLD AUTO: 1.9 10E9/L (ref 0.8–5.3)
LYMPHOCYTES NFR BLD AUTO: 30.5 %
MCH RBC QN AUTO: 33.5 PG (ref 26.5–33)
MCHC RBC AUTO-ENTMCNC: 35.2 G/DL (ref 31.5–36.5)
MCV RBC AUTO: 95 FL (ref 78–100)
MONOCYTES # BLD AUTO: 0.7 10E9/L (ref 0–1.3)
MONOCYTES NFR BLD AUTO: 11.8 %
NEUTROPHILS # BLD AUTO: 3.5 10E9/L (ref 1.6–8.3)
NEUTROPHILS NFR BLD AUTO: 55.5 %
PLATELET # BLD AUTO: 198 10E9/L (ref 150–450)
POTASSIUM SERPL-SCNC: 4.4 MMOL/L (ref 3.4–5.3)
PROT SERPL-MCNC: 7.9 G/DL (ref 6.8–8.8)
RBC # BLD AUTO: 4.81 10E12/L (ref 4.4–5.9)
SODIUM SERPL-SCNC: 132 MMOL/L (ref 133–144)
WBC # BLD AUTO: 6.3 10E9/L (ref 4–11)

## 2018-11-12 PROCEDURE — 85652 RBC SED RATE AUTOMATED: CPT | Performed by: INTERNAL MEDICINE

## 2018-11-12 PROCEDURE — 93000 ELECTROCARDIOGRAM COMPLETE: CPT

## 2018-11-12 PROCEDURE — 82085 ASSAY OF ALDOLASE: CPT | Mod: 90 | Performed by: INTERNAL MEDICINE

## 2018-11-12 PROCEDURE — 83036 HEMOGLOBIN GLYCOSYLATED A1C: CPT | Performed by: INTERNAL MEDICINE

## 2018-11-12 PROCEDURE — 83690 ASSAY OF LIPASE: CPT | Performed by: INTERNAL MEDICINE

## 2018-11-12 PROCEDURE — 99207 ZZC NO CHARGE NURSE ONLY: CPT

## 2018-11-12 PROCEDURE — 80076 HEPATIC FUNCTION PANEL: CPT | Performed by: INTERNAL MEDICINE

## 2018-11-12 PROCEDURE — 80048 BASIC METABOLIC PNL TOTAL CA: CPT | Performed by: INTERNAL MEDICINE

## 2018-11-12 PROCEDURE — 71101 X-RAY EXAM UNILAT RIBS/CHEST: CPT | Mod: RT

## 2018-11-12 PROCEDURE — 85025 COMPLETE CBC W/AUTO DIFF WBC: CPT | Performed by: INTERNAL MEDICINE

## 2018-11-12 PROCEDURE — 99000 SPECIMEN HANDLING OFFICE-LAB: CPT | Performed by: INTERNAL MEDICINE

## 2018-11-12 PROCEDURE — 82550 ASSAY OF CK (CPK): CPT | Performed by: INTERNAL MEDICINE

## 2018-11-12 PROCEDURE — 82150 ASSAY OF AMYLASE: CPT | Performed by: INTERNAL MEDICINE

## 2018-11-12 PROCEDURE — 99214 OFFICE O/P EST MOD 30 MIN: CPT | Performed by: INTERNAL MEDICINE

## 2018-11-12 PROCEDURE — 36415 COLL VENOUS BLD VENIPUNCTURE: CPT | Performed by: INTERNAL MEDICINE

## 2018-11-12 RX ORDER — GLIPIZIDE 5 MG/1
TABLET ORAL
Qty: 180 TABLET | Refills: 0 | Status: SHIPPED | OUTPATIENT
Start: 2018-11-12 | End: 2019-02-11

## 2018-11-12 RX ORDER — AZITHROMYCIN 250 MG/1
TABLET, FILM COATED ORAL
Qty: 6 TABLET | Refills: 0 | Status: SHIPPED | OUTPATIENT
Start: 2018-11-12 | End: 2019-03-15

## 2018-11-12 RX ORDER — LISINOPRIL 10 MG/1
10 TABLET ORAL DAILY
Qty: 90 TABLET | Refills: 3 | Status: SHIPPED | OUTPATIENT
Start: 2018-11-12 | End: 2020-03-13

## 2018-11-12 ASSESSMENT — PAIN SCALES - GENERAL: PAINLEVEL: SEVERE PAIN (6)

## 2018-11-12 NOTE — NURSING NOTE
Per Dr. Rolon EKG is okay and will be seen shortly, notified patient of this, Patient verbalized understanding and agreement with plan.     Azeb Mac RN  [facility]

## 2018-11-12 NOTE — MR AVS SNAPSHOT
"              After Visit Summary   11/12/2018    Zachary Chand    MRN: [id number]           Patient Information     Date Of Birth          1960        Visit Information        Provider Department      11/12/2018 9:00 AM CP RN [facility]        Today's Diagnoses     Chest pain    -  1       Follow-ups after your visit        Your next 10 appointments already scheduled     Nov 12, 2018  9:40 AM CST   SHORT with Martell Rolon MD   [facility] ([facility])    [address] [place] [id number] [phone]              Who to contact     If you have questions or need follow up information about today's clinic visit or your schedule please contact [facility] directly at [phone].  Normal or non-critical lab and imaging results will be communicated to you by MyChart, letter or phone within 4 business days after the clinic has received the results. If you do not hear from us within 7 days, please contact the clinic through MyChart or phone. If you have a critical or abnormal lab result, we will notify you by phone as soon as possible.  Submit refill requests through [employer] or call your pharmacy and they will forward the refill request to us. Please allow 3 business days for your refill to be completed.          Additional Information About Your Visit        [employer] Information     [employer] lets you send messages to your doctor, view your test results, renew your prescriptions, schedule appointments and more. To sign up, go to [email or web address] . Click on \"Log in\" on the left side of the screen, which will take you to the Welcome page. Then click on \"Sign up Now\" on the right side of the page.     You will be asked to enter the access code listed below, as well as some personal information. Please follow the directions to create your username and password.     Your access code is: " [email or web address]  Expires: 2/10/2019  9:20 AM     Your access code will  in 90 days. If you need help or a new code, please call your [facility] or [phone].        Care EveryWhere ID     This is your Care EveryWhere ID. This could be used by other organizations to access your [facility] medical records  [id number]        Your Vitals Were     Pulse Temperature Respirations Pulse Oximetry          95 98.1  F (36.7  C) (Oral) 16 95%         Blood Pressure from Last 3 Encounters:   18 (!) 170/109   18 135/84   10/31/17 152/78    Weight from Last 3 Encounters:   18 235 lb (106.6 kg)   10/31/17 241 lb 4 oz (109.4 kg)   17 240 lb (108.9 kg)              We Performed the Following     EKG 12-lead complete w/read - Clinics        Primary Care Provider Office Phone # Fax #    Fernando Sutton [phone]       [address] [facility] [address]        Equal Access to Services     [facility] Hospital[facility]SE[facility] : Hadii aad ku hadasho Soomaali, waaxda luqadaha, qaybta kaalmada adejhoanayada, eddie kelly . So [facility] [phone].    ATENCIÓN: Si habla español, tiene a quintero disposición servicios gratuitos de asistencia lingüística. Llame al [phone].    We comply with applicable federal civil rights laws and [place] laws. We do not discriminate on the basis of race, color, national origin, age, disability, sex, sexual orientation, or gender identity.            Thank you!     Thank you for choosing [facility]  for your care. Our goal is always to provide you with excellent care. Hearing back from our patients is one way we can continue to improve our services. Please take a few minutes to complete the written survey that you may receive in the mail after your visit with us. Thank you!             Your Updated Medication List - Protect others around you: Learn how to safely use, store and throw away your medicines at  www.disposemymeds.org.          This list is accurate as of 11/12/18  9:20 AM.  Always use your most recent med list.                   Brand Name Dispense Instructions for use Diagnosis    aspirin 81 MG EC tablet     90 tablet    Take 1 tablet (81 mg) by mouth daily    Type 2 diabetes mellitus with hyperglycemia, without long-term current use of insulin (H)       atorvastatin 20 MG tablet    LIPITOR    90 tablet    Take 1 tablet (20 mg) by mouth daily    Type 2 diabetes mellitus with hyperglycemia, without long-term current use of insulin (H)       blood glucose monitoring meter device kit    no brand specified    1 kit    Use to test blood sugar 1 times daily or as directed.  Also refills on strips and lancets, refills for a year    Type 2 diabetes mellitus without complication (H)       blood glucose monitoring test strip    no brand specified    100 strip    Use to test blood sugars 2  times daily or as directed    Type 2 diabetes mellitus with hyperglycemia, without long-term current use of insulin (H)       EPINEPHrine 0.3 MG/0.3ML injection    EPIPEN 2-FAITH    1 each    Inject 0.3 mLs (0.3 mg) into the muscle once as needed for anaphylaxis    Bee sting allergy       glipiZIDE 5 MG tablet    GLUCOTROL    180 tablet    TAKE ONE TABLET BY MOUTH TWICE A DAY BEFORE MEALS    Type 2 diabetes mellitus with hyperglycemia, without long-term current use of insulin (H)       ibuprofen 200 MG tablet    ADVIL/MOTRIN     Take 200 mg by mouth every 4 hours as needed Reported on 4/14/2017        lisinopril 10 MG tablet    PRINIVIL/ZESTRIL    90 tablet    Take 1 tablet (10 mg) by mouth daily    Hypertension goal BP (blood pressure) < 140/90       metFORMIN 1000 MG tablet    GLUCOPHAGE    60 tablet    TAKE 1 TABLET BY MOUTH TWICE A DAY WITH MEALS    Type 2 diabetes mellitus with hyperglycemia, without long-term current use of insulin (H)       nicotine 21 MG/24HR 24 hr patch    NICODERM CQ    30 patch    Place 1 patch onto the  skin every 24 hours    Smoker       oxyCODONE-acetaminophen 5-325 MG per tablet    PERCOCET    10 tablet    Take 1 tablet by mouth every 6 hours as needed for pain maximum 2 tablet(s) per day    Lumbar radiculopathy, DDD (degenerative disc disease), lumbar       sildenafil 100 MG tablet    VIAGRA    6 tablet    Take 1 tablet (100 mg) by mouth daily as needed for erectile dysfunction    Erectile dysfunction

## 2018-11-12 NOTE — NURSING NOTE
Patient walked into clinic complaining of right sided chest pain and weakness. See VS. Alert and oriented, skin warm and dry, respirations appear normal - no accessory muscles involved. S1S2 heard, lung sounds clear. States was prescribed a bunch of medications and took them for 2 days and then stopped because of right sided sharp chest pain which resolved after a couple of days of not taking his medications - only medication taken since January is Metformin and does not check BS regularly thinks last check a couple weeks ago and was 160   Consult with Dr. Rolon - obtain EKG - obtained. Lower right leg has a lot of brown areas.     zAeb Mac RN  [facility]

## 2018-11-12 NOTE — PROGRESS NOTES
SUBJECTIVE:                                                    Zachary Chand is a 58 year old male who presents to clinic today for the following health issues:    Came on gradually over last week  No chest pain  13 steps breathing too heavy  No heart disease.  Smoker.  Neurosurgery zoe the back 1         Amount of exercise or daily activities, outside of work:     Problems taking medications regularly No    Medication side effects: No    Other concerns to address:             ROS:  Constitutional, HEENT, cardiovascular, pulmonary, gi and gu systems are negative, except as otherwise noted.    Labs reviewed in EPIC  BP Readings from Last 3 Encounters:   11/12/18 (!) 170/109   01/08/18 135/84   10/31/17 152/78    Wt Readings from Last 3 Encounters:   01/08/18 235 lb (106.6 kg)   10/31/17 241 lb 4 oz (109.4 kg)   09/01/17 240 lb (108.9 kg)                  Patient Active Problem List   Diagnosis     Chronic daily headache     Venous stasis ulcers (H)     Type 2 diabetes, HbA1c goal < 7% (H)     Hyperlipidemia LDL goal <100     Hypertension goal BP (blood pressure) < 140/90     Venous insufficiency     Bee sting allergy     Contusion of bone     Varicose veins     Tobacco abuse     Obesity     Type 2 diabetes mellitus without complication, without long-term current use of insulin (H)     Past Surgical History:   Procedure Laterality Date     HERNIA REPAIR  1997    bilateral groin     ORTHOPEDIC SURGERY  1977    right shoulder dislocation       Social History   Substance Use Topics     Smoking status: Current Every Day Smoker     Packs/day: 1.00     Types: Cigarettes     Smokeless tobacco: Never Used     Alcohol use Yes      Comment: 1-5 daily     Family History   Problem Relation Age of Onset     Alcohol/Drug Father          Current Outpatient Prescriptions   Medication Sig Dispense Refill     azithromycin (ZITHROMAX) 250 MG tablet Two tablets first day, then one tablet daily for four days. 6 tablet 0     glipiZIDE  (GLUCOTROL) 5 MG tablet TAKE ONE TABLET BY MOUTH TWICE A DAY BEFORE MEALS 180 tablet 0     lisinopril (PRINIVIL/ZESTRIL) 10 MG tablet Take 1 tablet (10 mg) by mouth daily 90 tablet 3     aspirin 81 MG EC tablet Take 1 tablet (81 mg) by mouth daily 90 tablet 3     blood glucose monitoring (NO BRAND SPECIFIED) meter device kit Use to test blood sugar 1 times daily or as directed.    Also refills on strips and lancets, refills for a year 1 kit 11     blood glucose monitoring (NO BRAND SPECIFIED) test strip Use to test blood sugars 2   times daily or as directed 100 strip 1     EPINEPHrine (EPIPEN 2-FAITH) 0.3 MG/0.3ML injection Inject 0.3 mLs (0.3 mg) into the muscle once as needed for anaphylaxis (Patient not taking: Reported on 1/8/2018) 1 each 1     ibuprofen (ADVIL,MOTRIN) 200 MG tablet Take 200 mg by mouth every 4 hours as needed Reported on 4/14/2017       metFORMIN (GLUCOPHAGE) 1000 MG tablet TAKE 1 TABLET BY MOUTH TWICE A DAY WITH MEALS 60 tablet 0     nicotine (NICODERM CQ) 21 MG/24HR patch 2h hr Place 1 patch onto the skin every 24 hours (Patient not taking: Reported on 1/8/2018) 30 patch 2     sildenafil (VIAGRA) 100 MG tablet Take 1 tablet (100 mg) by mouth daily as needed for erectile dysfunction (Patient not taking: Reported on 1/8/2018) 6 tablet 12     Allergies   Allergen Reactions     Bee Pollen      Swelling      No Known Drug Allergies        OBJECTIVE:                                                    There were no vitals taken for this visit. There is no height or weight on file to calculate BMI.   GENERAL: healthy, alert and no distress  NECK: no tenderness, no adenopathy, no asymmetry, no masses, no stiffness; thyroid- normal to palpation  RESP: lungs clear to auscultation - no rales, no rhonchi, no wheezes  CV: regular rates and rhythm, normal S1 S2, no S3 or S4 and no murmur, no click or rub -  ABDOMEN: soft, no tenderness, no  hepatosplenomegaly, no masses, normal bowel sounds  MS: extremities-  no gross deformities noted, no edema       ASSESSMENT/PLAN:                                                        ICD-10-CM    1. Rib pain on right side R07.81 CBC with platelets and differential     Basic metabolic panel     XR Ribs & Chest Right G/E 3 Views     CK total     ESR: Erythrocyte sedimentation rate     Aldolase     Hemoglobin A1c     Hepatic panel (Albumin, ALT, AST, Bili, Alk Phos, TP)     Amylase     Lipase     Echocardiogram Complete   2. Generalized muscle weakness M62.81 CBC with platelets and differential     Basic metabolic panel     XR Ribs & Chest Right G/E 3 Views     CK total     ESR: Erythrocyte sedimentation rate     Aldolase     Hemoglobin A1c   3. Hypertension goal BP (blood pressure) < 140/90 I10 CBC with platelets and differential     Basic metabolic panel     XR Ribs & Chest Right G/E 3 Views     CK total     ESR: Erythrocyte sedimentation rate     Aldolase     Hemoglobin A1c     lisinopril (PRINIVIL/ZESTRIL) 10 MG tablet   4. Hyperlipidemia LDL goal <100 E78.5 CBC with platelets and differential     Basic metabolic panel     XR Ribs & Chest Right G/E 3 Views     CK total     ESR: Erythrocyte sedimentation rate     Aldolase     Hemoglobin A1c   5. Type 2 diabetes mellitus without complication, without long-term current use of insulin (H) E11.9 CBC with platelets and differential     Basic metabolic panel     XR Ribs & Chest Right G/E 3 Views     CK total     ESR: Erythrocyte sedimentation rate     Aldolase     Hemoglobin A1c   6. Abdominal pain, right upper quadrant R10.11 Hepatic panel (Albumin, ALT, AST, Bili, Alk Phos, TP)     US Abdomen Complete     Echocardiogram Complete   7. Type 2 diabetes mellitus with hyperglycemia, without long-term current use of insulin (H) E11.65 glipiZIDE (GLUCOTROL) 5 MG tablet   8. Malignant neoplasm of lower lobe of right lung (H) C34.31 azithromycin (ZITHROMAX) 250 MG tablet   9. Special screening for malignant neoplasms, colon Z12.11  GASTROENTEROLOGY ADULT REF PROCEDURE ONLY     BP     Data Unavailable  11/13/2018    Lab Results   Component Value Date     11/12/2018     Lab Results   Component Value Date    A1C 9.6 11/12/2018     Lab Results   Component Value Date     01/08/2018     Lab Results   Component Value Date    MICROL 12 01/08/2018     No results found for: MICROALBUMIN        ICD-10-CM    1. Rib pain on right side R07.81 CBC with platelets and differential     Basic metabolic panel     XR Ribs & Chest Right G/E 3 Views     CK total     ESR: Erythrocyte sedimentation rate     Aldolase     Hemoglobin A1c     Hepatic panel (Albumin, ALT, AST, Bili, Alk Phos, TP)     Amylase     Lipase     Echocardiogram Complete   2. Generalized muscle weakness M62.81 CBC with platelets and differential     Basic metabolic panel     XR Ribs & Chest Right G/E 3 Views     CK total     ESR: Erythrocyte sedimentation rate     Aldolase     Hemoglobin A1c   3. Hypertension goal BP (blood pressure) < 140/90 I10 CBC with platelets and differential     Basic metabolic panel     XR Ribs & Chest Right G/E 3 Views     CK total     ESR: Erythrocyte sedimentation rate     Aldolase     Hemoglobin A1c     lisinopril (PRINIVIL/ZESTRIL) 10 MG tablet   4. Hyperlipidemia LDL goal <100 E78.5 CBC with platelets and differential     Basic metabolic panel     XR Ribs & Chest Right G/E 3 Views     CK total     ESR: Erythrocyte sedimentation rate     Aldolase     Hemoglobin A1c   5. Type 2 diabetes mellitus without complication, without long-term current use of insulin (H) E11.9 CBC with platelets and differential     Basic metabolic panel     XR Ribs & Chest Right G/E 3 Views     CK total     ESR: Erythrocyte sedimentation rate     Aldolase     Hemoglobin A1c   6. Abdominal pain, right upper quadrant R10.11 Hepatic panel (Albumin, ALT, AST, Bili, Alk Phos, TP)     US Abdomen Complete     Echocardiogram Complete   7. Type 2 diabetes mellitus with hyperglycemia,  without long-term current use of insulin (H) E11.65 glipiZIDE (GLUCOTROL) 5 MG tablet   8. Malignant neoplasm of lower lobe of right lung (H) C34.31 azithromycin (ZITHROMAX) 250 MG tablet   9. Special screening for malignant neoplasms, colon Z12.11 GASTROENTEROLOGY ADULT REF PROCEDURE ONLY       Risks, benefits and alternatives of treatments discussed. Plan agreed on.      I think most of the patient's issues stem from poorly controlled diabetes and alcohol consumption  No clear evidence of active heart disease and no true chest or arm pain  Pain is under the right diaphram    Will follow on imaging    Set alcohol consumption to 1-2 beers daily  If unable to do this may need to stop altogether    Slowly restart medications  recommneded insulin - wants to restart orals . If able to decrease alcohol consumption, and take medication regularly, should see improvement    Reinforced the only way to completely rule out heart issue is to do stress testing.  However right sided pain and muscle weakness are the main presenting symptomatology        Martell Rolon MD  [facility]

## 2018-11-12 NOTE — MR AVS SNAPSHOT
After Visit Summary   11/12/2018    Zachary Chand    MRN: [id number]           Patient Information     Date Of Birth          1960        Visit Information        Provider Department      11/12/2018 9:40 AM Martell Rolon MD [facility]        Today's Diagnoses     Rib pain on right side    -  1    Generalized muscle weakness        Hypertension goal BP (blood pressure) < 140/90        Hyperlipidemia LDL goal <100        Type 2 diabetes mellitus without complication, without long-term current use of insulin (H)        Abdominal pain, right upper quadrant        Type 2 diabetes mellitus with hyperglycemia, without long-term current use of insulin (H)        Malignant neoplasm of lower lobe of right lung (H)        Special screening for malignant neoplasms, colon           Follow-ups after your visit        Additional Services     GASTROENTEROLOGY ADULT REF PROCEDURE ONLY       Last Lab Result: Creatinine (mg/dL)       Date                     Value                 01/08/2018               0.78             ----------  There is no height or weight on file to calculate BMI.     Needed:  No  Language:  English    Patient will be contacted to schedule procedure.     Please be aware that coverage of these services is subject to the terms and limitations of your health insurance plan.  Call member services at your health plan with any benefit or coverage questions.  Any procedures must be performed at a [facility] facility OR coordinated by your clinic's referral office.    Please bring the following with you to your appointment:    (1) Any X-Rays, CTs or MRIs which have been performed.  Contact the facility where they were done to arrange for  prior to your scheduled appointment.    (2) List of current medications   (3) This referral request   (4) Any documents/labs given to you for this referral                  Your next 10 appointments already scheduled     Nov  15, 2018  3:00 PM CST   Ech Complete with [id number]   [facility] ([facility])    [address]  [facility] [address]   [phone]           1.  Please bring or wear a comfortable two-piece outfit. 2.  You may eat, drink and take your normal medicines. 3.  For any questions that cannot be answered, please contact the ordering physician 4.  Please do not wear perfumes or scented lotions on the day of your exam.            Nov 16, 2018  7:40 AM CST   US ABDOMEN COMPLETE with FKUS1   [facility] ([facility])    [address] [place] [address]   [phone]           How do I prepare for my exam? (Food and drink instructions) Adults: No eating, smoking, gum chewing or drinking for 8 hours before the exam. You may take medicine with a small sip of water.  Children: * Infants, breast-fed: may have breast milk up to 2 hours before exam. * Infants, formula: may have bottle until 4 hours before exam. * Children 1-5 years: No food or drink for 4 hours before exam. * Children 6 -12 years: No food or drink for 6 hours before exam. * Children over 12 years: No food or drink for 8 hours before exam.  * J Tube Fed: No need to stop feedings.  What should I wear: Wear comfortable clothes.  How long does the exam take: Most ultrasounds take 30 to 60 minutes.  What should I bring: Bring a list of your medicines, including vitamins, minerals and over-the-counter drugs. It is safest to leave personal items at home.  Do I need a :  No  is needed.  What do I need to tell my doctor: Tell your doctor about any allergies you may have.  What should I do after the exam: No restrictions, You may resume normal activities.  What is this test: An ultrasound uses sound waves to make pictures of the body. Sound waves do not cause pain. The only discomfort may be the pressure of the wand against your skin or full bladder.  Who  "should I call with questions: If you have any questions, please call the Imaging Department where you will have your exam. Directions, parking instructions, and other information is available on our website, [email or web address].              Future tests that were ordered for you today     Open Future Orders        Priority Expected Expires Ordered    US Abdomen Complete Routine  2019    Echocardiogram Complete Routine  2019            Who to contact     If you have questions or need follow up information about today's clinic visit or your schedule please contact [facility] directly at [phone].  Normal or non-critical lab and imaging results will be communicated to you by G-modehart, letter or phone within 4 business days after the clinic has received the results. If you do not hear from us within 7 days, please contact the clinic through G-modehart or phone. If you have a critical or abnormal lab result, we will notify you by phone as soon as possible.  Submit refill requests through [email or web address] or call your pharmacy and they will forward the refill request to us. Please allow 3 business days for your refill to be completed.          Additional Information About Your Visit        MyChart Information     [email or web address] lets you send messages to your doctor, view your test results, renew your prescriptions, schedule appointments and more. To sign up, go to [email or web address] . Click on \"Log in\" on the left side of the screen, which will take you to the Welcome page. Then click on \"Sign up Now\" on the right side of the page.     You will be asked to enter the access code listed below, as well as some personal information. Please follow the directions to create your username and password.     Your access code is: [email or web address]  Expires: 2/10/2019  9:20 AM     Your access code will  in 90 days. If you need help or a new code, please call your [facility] or " [phone].        Care EveryWhere ID     This is your Care EveryWhere ID. This could be used by other organizations to access your [facility] medical records  [id number]         Blood Pressure from Last 3 Encounters:   11/12/18 (!) 170/109   01/08/18 135/84   10/31/17 152/78    Weight from Last 3 Encounters:   01/08/18 235 lb (106.6 kg)   10/31/17 241 lb 4 oz (109.4 kg)   09/01/17 240 lb (108.9 kg)              We Performed the Following     Aldolase     Amylase     Basic metabolic panel     CBC with platelets and differential     CK total     ESR: Erythrocyte sedimentation rate     GASTROENTEROLOGY ADULT REF PROCEDURE ONLY     Hemoglobin A1c     Hepatic panel (Albumin, ALT, AST, Bili, Alk Phos, TP)     Lipase          Today's Medication Changes          These changes are accurate as of 11/12/18 11:02 AM.  If you have any questions, ask your nurse or doctor.               Start taking these medicines.        Dose/Directions    azithromycin 250 MG tablet   Commonly known as:  ZITHROMAX   Used for:  Malignant neoplasm of lower lobe of right lung (H)   Started by:  Martell Rolon MD        Two tablets first day, then one tablet daily for four days.   Quantity:  6 tablet   Refills:  0         Stop taking these medicines if you haven't already. Please contact your care team if you have questions.     atorvastatin 20 MG tablet   Commonly known as:  LIPITOR   Stopped by:  Martell Rolon MD           oxyCODONE-acetaminophen 5-325 MG per tablet   Commonly known as:  PERCOCET   Stopped by:  Martell Rolon MD                Where to get your medicines      These medications [id number] [place] - [address]  [address] [place] [address]     Phone:  [phone]     azithromycin 250 MG tablet    glipiZIDE 5 MG tablet    lisinopril 10 MG tablet                Primary Care Provider Office Phone # Fax #    Fernando Sutton [phone]       [address]  [facility] [address]        Equal Access to Services     AQUILINO JUAREZ : Hadii aad ku hadvalerichi Sooneliaali, waaxda luqadaha, qaybta kaalmada eddie husain. So [facility] [phone].    ATENCIÓN: Si habla español, tiene a quintero disposición servicios gratuitos de asistencia lingüística. Llame al [phone].    We comply with applicable federal civil rights laws and [place] laws. We do not discriminate on the basis of race, color, national origin, age, disability, sex, sexual orientation, or gender identity.            Thank you!     Thank you for choosing [facility]  for your care. Our goal is always to provide you with excellent care. Hearing back from our patients is one way we can continue to improve our services. Please take a few minutes to complete the written survey that you may receive in the mail after your visit with us. Thank you!             Your Updated Medication List - Protect others around you: Learn how to safely use, store and throw away your medicines at www.disposemymeds.org.          This list is accurate as of 11/12/18 11:02 AM.  Always use your most recent med list.                   Brand Name Dispense Instructions for use Diagnosis    aspirin 81 MG EC tablet     90 tablet    Take 1 tablet (81 mg) by mouth daily    Type 2 diabetes mellitus with hyperglycemia, without long-term current use of insulin (H)       azithromycin 250 MG tablet    ZITHROMAX    6 tablet    Two tablets first day, then one tablet daily for four days.    Malignant neoplasm of lower lobe of right lung (H)       blood glucose monitoring meter device kit    no brand specified    1 kit    Use to test blood sugar 1 times daily or as directed.  Also refills on strips and lancets, refills for a year    Type 2 diabetes mellitus without complication (H)       blood glucose monitoring test strip    no brand specified    100 strip    Use to test blood sugars 2  times daily  or as directed    Type 2 diabetes mellitus with hyperglycemia, without long-term current use of insulin (H)       EPINEPHrine 0.3 MG/0.3ML injection    EPIPEN 2-FAITH    1 each    Inject 0.3 mLs (0.3 mg) into the muscle once as needed for anaphylaxis    Bee sting allergy       glipiZIDE 5 MG tablet    GLUCOTROL    180 tablet    TAKE ONE TABLET BY MOUTH TWICE A DAY BEFORE MEALS    Type 2 diabetes mellitus with hyperglycemia, without long-term current use of insulin (H)       ibuprofen 200 MG tablet    ADVIL/MOTRIN     Take 200 mg by mouth every 4 hours as needed Reported on 4/14/2017        lisinopril 10 MG tablet    PRINIVIL/ZESTRIL    90 tablet    Take 1 tablet (10 mg) by mouth daily    Hypertension goal BP (blood pressure) < 140/90       metFORMIN 1000 MG tablet    GLUCOPHAGE    60 tablet    TAKE 1 TABLET BY MOUTH TWICE A DAY WITH MEALS    Type 2 diabetes mellitus with hyperglycemia, without long-term current use of insulin (H)       nicotine 21 MG/24HR 24 hr patch    NICODERM CQ    30 patch    Place 1 patch onto the skin every 24 hours    Smoker       sildenafil 100 MG tablet    VIAGRA    6 tablet    Take 1 tablet (100 mg) by mouth daily as needed for erectile dysfunction    Erectile dysfunction

## 2018-11-12 NOTE — LETTER
[facility]   [address] [place]  [address]  [phone]                                   November 14, 2018    Zachary Chand  [address] [facility]  [place] [address]        Dear Zachary,    Labs are fine except for blood sugar and low sodium ( from elevated blood sugar).  The symptoms should improve with resumption of the medications and significant reductions in alcohol consumption    Results for orders placed or performed in visit on 11/12/18   CBC with platelets and differential   Result Value Ref Range    WBC 6.3 4.0 - 11.0 10e9/L    RBC Count 4.81 4.4 - 5.9 10e12/L    Hemoglobin 16.1 13.3 - 17.7 g/dL    Hematocrit 45.7 40.0 - 53.0 %    MCV 95 78 - 100 fl    MCH 33.5 (H) 26.5 - 33.0 pg    MCHC 35.2 31.5 - 36.5 g/dL    RDW 11.5 10.0 - 15.0 %    Platelet Count 198 150 - 450 10e9/L    % Neutrophils 55.5 %    % Lymphocytes 30.5 %    % Monocytes 11.8 %    % Eosinophils 1.6 %    % Basophils 0.6 %    Absolute Neutrophil 3.5 1.6 - 8.3 10e9/L    Absolute Lymphocytes 1.9 0.8 - 5.3 10e9/L    Absolute Monocytes 0.7 0.0 - 1.3 10e9/L    Absolute Eosinophils 0.1 0.0 - 0.7 10e9/L    Absolute Basophils 0.0 0.0 - 0.2 10e9/L    Diff Method Automated Method    Basic metabolic panel   Result Value Ref Range    Sodium 132 (L) 133 - 144 mmol/L    Potassium 4.4 3.4 - 5.3 mmol/L    Chloride 99 94 - 109 mmol/L    Carbon Dioxide 25 20 - 32 mmol/L    Anion Gap 8 3 - 14 mmol/L    Glucose 255 (H) 70 - 99 mg/dL    Urea Nitrogen 8 7 - 30 mg/dL    Creatinine 0.78 0.66 - 1.25 mg/dL    GFR Estimate >90 >60 mL/min/1.7m2    GFR Estimate If Black >90 >60 mL/min/1.7m2    Calcium 8.8 8.5 - 10.1 mg/dL   CK total   Result Value Ref Range    CK Total 152 30 - 300 U/L   ESR: Erythrocyte sedimentation rate   Result Value Ref Range    Sed Rate 7 0 - 20 mm/h   Aldolase   Result Value Ref Range    Aldolase 3.3 1.5 - 8.1 U/L   Hemoglobin A1c   Result Value Ref Range    Hemoglobin A1C 9.6 (H) 0 - 5.6 %    Hepatic panel (Albumin, ALT, AST, Bili, Alk Phos, TP)   Result Value Ref Range    Bilirubin Direct 0.1 0.0 - 0.2 mg/dL    Bilirubin Total 0.3 0.2 - 1.3 mg/dL    Albumin 4.0 3.4 - 5.0 g/dL    Protein Total 7.9 6.8 - 8.8 g/dL    Alkaline Phosphatase 62 40 - 150 U/L    ALT 31 0 - 70 U/L    AST 18 0 - 45 U/L   Amylase   Result Value Ref Range    Amylase 35 30 - 110 U/L   Lipase   Result Value Ref Range    Lipase 157 73 - 393 U/L       If you have any questions please call the clinic at [phone]    Sincerely,    Martell Rolon MD  hnr

## 2018-11-12 NOTE — LETTER
[address] [place] [address]  Phone: [phone]  Fax: [phone]    November 12, 2018        Zachary Chand  [address]  [place] [address]          To whom it may concern:    RE: Zachary Chand    Patient was seen and treated today at our clinic and missed work.    Please contact me for questions or concerns.      Sincerely,        Martell Rolon MD

## 2018-11-13 LAB — ALDOLASE SERPL-CCNC: 3.3 U/L (ref 1.5–8.1)

## 2018-11-15 ENCOUNTER — RADIANT APPOINTMENT (OUTPATIENT)
Dept: CARDIOLOGY | Facility: CLINIC | Age: 58
End: 2018-11-15
Attending: INTERNAL MEDICINE
Payer: COMMERCIAL

## 2018-11-15 DIAGNOSIS — R10.11 ABDOMINAL PAIN, RIGHT UPPER QUADRANT: ICD-10-CM

## 2018-11-15 DIAGNOSIS — R07.81 RIB PAIN ON RIGHT SIDE: ICD-10-CM

## 2018-11-15 PROCEDURE — 93306 TTE W/DOPPLER COMPLETE: CPT | Mod: GC | Performed by: INTERNAL MEDICINE

## 2018-11-15 NOTE — NURSING NOTE
Patient presents today for resting echo ordered by MD.   Echo Tech provided patient education.    Echo technician completed resting echo. Data transferred to [facility] for final interpretation through [facility].   Patient education provided about cardiology interpretation and primary provider will be notified of results.    Arelis Elizabeth RDCS

## 2018-11-15 NOTE — LETTER
[facility]   [address] [place]  [address]  [phone]                                   2018    Zachary Chand  [address]  [address]  [place] [address]        Dear Zachary,    Your heart test is normal      Results for orders placed or performed in visit on 11/15/18   Echocardiogram Complete    Narrative    [id number]  [id number]  [id number]  [id number]^SINGH^RASHEL           [facility], Echocardiography Laboratory  [address] [place] [address]  [place] [address]        Name: ZACHARY CHAND  MRN: [id number]  : 1960  Study Date: 11/15/2018 02:33 PM  Age: 58 yrs  Gender: Male  Patient Location: [facility]  Reason For Study: Rib pain on right side, Abdominal pain, right upper quadrant  Ordering Physician: RASHEL WINTERS  Referring Physician: RASHEL WINTERS  Performed By: Arelis Elizabeth STEVENSON     BSA: 2.3 m2  Height: 72 in  Weight: 235 lb  BP: 139/80 mmHg  _____________________________________________________________________________  __        Procedure  Echocardiogram with two-dimensional, color and spectral Doppler performed.  _____________________________________________________________________________  __        Interpretation Summary     Global and regional left ventricular function is normal with an EF of 55-60%.  Right ventricular function, chamber size, wall motion, and thickness are  normal.  No significant valvular abnormalities.  The inferior vena cava was normal in size.  No pericardial effusion is present.     _____________________________________________________________________________  __        Left Ventricle  Left ventricular size is normal. Global and regional left ventricular function  is normal with an EF of 55-60%. Relative wall thickness is increased  consistent with concentric remodeling. Left ventricular diastolic function is  normal. No regional wall motion abnormalities are seen.     Right Ventricle  Right  ventricular function, chamber size, wall motion, and thickness are  normal.     Atria  The left atrium appears normal. Mild right atrial enlargement is present.     Mitral Valve  The mitral valve is normal.        Aortic Valve  The aortic valve is tricuspid. Trace aortic insufficiency is present.     Tricuspid Valve  Trace to mild tricuspid insufficiency is present. Right ventricular systolic  pressure is 28mmHg above the right atrial pressure.     Pulmonic Valve  The pulmonic valve is normal.     Vessels  The aorta root is normal. The thoracic aorta is normal. The inferior vena cava  was normal in size with preserved respiratory variability. Estimated mean  right atrial pressure is 3 mmHg (normal).     Pericardium  No pericardial effusion is present.        Compared to Previous Study  Previous study not available for comparison.     Attestation  I have personally viewed the imaging and agree with the interpretation and  report as documented by the fellow, Scottie Cisneros, and/or edited by me.  _____________________________________________________________________________  __     MMode/2D Measurements & Calculations  IVSd: 1.2 cm  LVIDd: 4.7 cm  LVIDs: 2.8 cm  LVPWd: 1.2 cm  FS: 41.0 %  LV mass(C)d: 206.2 grams  LV mass(C)dI: 90.4 grams/m2  Ao root diam: 3.8 cm  LA dimension: 3.7 cm  asc Aorta Diam: 3.5 cm  LA/Ao: 0.97  LVOT diam: 2.5 cm  LVOT area: 5.0 cm2  LA Volume (BP): 61.0 ml     LA Volume Index (BP): 26.8 ml/m2  RWT: 0.50        Doppler Measurements & Calculations  MV E max pearl: 56.5 cm/sec  MV A max pearl: 70.0 cm/sec  MV E/A: 0.81  MV dec time: 0.12 sec  AI P1/2t: 500.8 msec  PA acc time: 0.07 sec  TR max pearl: 272.0 cm/sec  TR max P.6 mmHg  E/E' av.5  Lateral E/e': 5.5  Medial E/e': 7.5              _____________________________________________________________________________  __        Report approved by: Obie Bloom 11/15/2018 04:05 PM          If you have any questions please call the clinic  at [phone]    Sincerely,    Fernando Sutton MD  hnr

## 2018-11-16 ENCOUNTER — RADIANT APPOINTMENT (OUTPATIENT)
Dept: ULTRASOUND IMAGING | Facility: CLINIC | Age: 58
End: 2018-11-16
Attending: INTERNAL MEDICINE
Payer: COMMERCIAL

## 2018-11-16 DIAGNOSIS — R10.11 ABDOMINAL PAIN, RIGHT UPPER QUADRANT: ICD-10-CM

## 2018-11-16 PROCEDURE — 76700 US EXAM ABDOM COMPLETE: CPT

## 2018-12-21 ENCOUNTER — TELEPHONE (OUTPATIENT)
Dept: FAMILY MEDICINE | Facility: CLINIC | Age: 58
End: 2018-12-21

## 2018-12-29 DIAGNOSIS — E11.65 TYPE 2 DIABETES MELLITUS WITH HYPERGLYCEMIA, WITHOUT LONG-TERM CURRENT USE OF INSULIN (H): ICD-10-CM

## 2018-12-31 NOTE — TELEPHONE ENCOUNTER
"Requested Prescriptions   Pending Prescriptions Disp Refills     metFORMIN (GLUCOPHAGE) 1000 MG tablet [Pharmacy Med Name: METFORMIN 1000MG TABLETS] 60 tablet 0    Last Written Prescription Date:  11/9/18  Last Fill Quantity: 60,  # refills: 0   Last office visit: 11/12/2018 with prescribing provider:     Future Office Visit:     Sig: TAKE 1 TABLET BY MOUTH TWICE DAILY WITH FOOD    Biguanide Agents Failed - 12/29/2018 10:41 AM       Failed - Blood pressure less than 140/90 in past 6 months    BP Readings from Last 3 Encounters:   11/12/18 (!) 170/109   01/08/18 135/84   10/31/17 152/78                Passed - Patient has documented LDL within the past 12 mos.    Recent Labs   Lab Test 01/08/18  0858   *            Passed - Patient has had a Microalbumin in the past 15 mos.    Recent Labs   Lab Test 01/08/18  0910   MICROL 12   UMALCR 6.33            Passed - Patient is age 10 or older       Passed - Patient has documented A1c within the specified period of time.    If HgbA1C is 8 or greater, it needs to be on file within the past 3 months.  If less than 8, must be on file within the past 6 months.     Recent Labs   Lab Test 11/12/18  1021   A1C 9.6*            Passed - Patient's CR is NOT>1.4 OR Patient's EGFR is NOT<45 within past 12 mos.    Recent Labs   Lab Test 11/12/18  1021   GFRESTIMATED >90   GFRESTBLACK >90       Recent Labs   Lab Test 11/12/18  1021   CR 0.78            Passed - Patient does NOT have a diagnosis of CHF.       Passed - Recent (6 mo) or future (30 days) visit within the authorizing provider's specialty    Patient had office visit in the last 6 months or has a visit in the next 30 days with authorizing provider or within the authorizing provider's specialty.  See \"Patient Info\" tab in inbasket, or \"Choose Columns\" in Meds & Orders section of the refill encounter.              "

## 2018-12-31 NOTE — TELEPHONE ENCOUNTER
Routing refill request to provider for review/approval because:  Elevated BP.  Clare Spears RN CPC Triage.

## 2019-02-12 DIAGNOSIS — E11.65 TYPE 2 DIABETES MELLITUS WITH HYPERGLYCEMIA, WITHOUT LONG-TERM CURRENT USE OF INSULIN (H): ICD-10-CM

## 2019-02-12 NOTE — TELEPHONE ENCOUNTER
Requested Prescriptions   Pending Prescriptions Disp Refills     metFORMIN (GLUCOPHAGE) 1000 MG tablet 60 tablet 0    There is no refill protocol information for this order   Last Written Prescription Date:  12/31/18  Last Fill Quantity: 60,  # refills: 0   Last office visit: 11/12/2018 with prescribing provider:     Future Office Visit:

## 2019-02-12 NOTE — TELEPHONE ENCOUNTER
Patient states he is absolutely out of his Metformin and would like the script filled and sent to the [facility] on [address].  If any problems with this, please call the patient at [phone].  Thank you.    Jacqueline ROGEL  Patient Representative  [place]

## 2019-02-13 NOTE — TELEPHONE ENCOUNTER
We sent in a month    Advise clinic appointment soon    Please inform patient    Fernando Sutton MD

## 2019-03-15 ENCOUNTER — OFFICE VISIT (OUTPATIENT)
Dept: FAMILY MEDICINE | Facility: CLINIC | Age: 59
End: 2019-03-15
Payer: COMMERCIAL

## 2019-03-15 ENCOUNTER — TELEPHONE (OUTPATIENT)
Dept: FAMILY MEDICINE | Facility: CLINIC | Age: 59
End: 2019-03-15

## 2019-03-15 VITALS
SYSTOLIC BLOOD PRESSURE: 127 MMHG | OXYGEN SATURATION: 98 % | TEMPERATURE: 97.7 F | BODY MASS INDEX: 30.92 KG/M2 | WEIGHT: 228 LBS | DIASTOLIC BLOOD PRESSURE: 75 MMHG

## 2019-03-15 DIAGNOSIS — Z72.0 TOBACCO ABUSE: ICD-10-CM

## 2019-03-15 DIAGNOSIS — Z12.5 SCREENING FOR PROSTATE CANCER: ICD-10-CM

## 2019-03-15 DIAGNOSIS — I10 HYPERTENSION GOAL BP (BLOOD PRESSURE) < 140/90: Primary | ICD-10-CM

## 2019-03-15 DIAGNOSIS — Z00.00 ENCOUNTER FOR PREVENTATIVE ADULT HEALTH CARE EXAMINATION: ICD-10-CM

## 2019-03-15 DIAGNOSIS — Z12.11 SPECIAL SCREENING FOR MALIGNANT NEOPLASMS, COLON: ICD-10-CM

## 2019-03-15 DIAGNOSIS — E11.65 TYPE 2 DIABETES MELLITUS WITH HYPERGLYCEMIA, WITHOUT LONG-TERM CURRENT USE OF INSULIN (H): ICD-10-CM

## 2019-03-15 DIAGNOSIS — M79.642 PAIN OF LEFT HAND: ICD-10-CM

## 2019-03-15 DIAGNOSIS — E78.5 HYPERLIPIDEMIA LDL GOAL <100: ICD-10-CM

## 2019-03-15 LAB
ANION GAP SERPL CALCULATED.3IONS-SCNC: 7 MMOL/L (ref 3–14)
BUN SERPL-MCNC: 12 MG/DL (ref 7–30)
CALCIUM SERPL-MCNC: 9 MG/DL (ref 8.5–10.1)
CHLORIDE SERPL-SCNC: 105 MMOL/L (ref 94–109)
CHOLEST SERPL-MCNC: 181 MG/DL
CO2 SERPL-SCNC: 28 MMOL/L (ref 20–32)
CREAT SERPL-MCNC: 0.87 MG/DL (ref 0.66–1.25)
GFR SERPL CREATININE-BSD FRML MDRD: >90 ML/MIN/{1.73_M2}
GLUCOSE SERPL-MCNC: 164 MG/DL (ref 70–99)
HBA1C MFR BLD: 7 % (ref 0–5.6)
HDLC SERPL-MCNC: 37 MG/DL
LDLC SERPL CALC-MCNC: 119 MG/DL
NONHDLC SERPL-MCNC: 144 MG/DL
POTASSIUM SERPL-SCNC: 4.5 MMOL/L (ref 3.4–5.3)
PSA SERPL-ACNC: 2.38 UG/L (ref 0–4)
SODIUM SERPL-SCNC: 140 MMOL/L (ref 133–144)
TRIGL SERPL-MCNC: 124 MG/DL

## 2019-03-15 PROCEDURE — 99396 PREV VISIT EST AGE 40-64: CPT | Performed by: NURSE PRACTITIONER

## 2019-03-15 PROCEDURE — 83036 HEMOGLOBIN GLYCOSYLATED A1C: CPT | Performed by: NURSE PRACTITIONER

## 2019-03-15 PROCEDURE — 80061 LIPID PANEL: CPT | Performed by: NURSE PRACTITIONER

## 2019-03-15 PROCEDURE — 99213 OFFICE O/P EST LOW 20 MIN: CPT | Mod: 25 | Performed by: NURSE PRACTITIONER

## 2019-03-15 PROCEDURE — G0103 PSA SCREENING: HCPCS | Performed by: NURSE PRACTITIONER

## 2019-03-15 PROCEDURE — 80048 BASIC METABOLIC PNL TOTAL CA: CPT | Performed by: NURSE PRACTITIONER

## 2019-03-15 PROCEDURE — 36415 COLL VENOUS BLD VENIPUNCTURE: CPT | Performed by: NURSE PRACTITIONER

## 2019-03-15 RX ORDER — ATORVASTATIN CALCIUM 20 MG/1
20 TABLET, FILM COATED ORAL DAILY
Qty: 90 TABLET | Refills: 3 | Status: SHIPPED | OUTPATIENT
Start: 2019-03-15 | End: 2019-03-18

## 2019-03-15 RX ORDER — GLIPIZIDE 5 MG/1
TABLET ORAL
Qty: 180 TABLET | Refills: 1 | Status: SHIPPED | OUTPATIENT
Start: 2019-03-15 | End: 2020-03-13

## 2019-03-15 ASSESSMENT — PAIN SCALES - GENERAL: PAINLEVEL: NO PAIN (0)

## 2019-03-15 NOTE — LETTER
[facility]  [address] [place]  [address]  [phone]        March 18, 2019    Zachary Chand  [address]  [address]  [place] [address]        Dear Zachary,    The results of your recent labs are enclosed.   Your A1c is 7.0% which is a great improvement since last check. Keep up the good work.   Your prostate cancer screening test (PSA) was negative.   Your cholesterol is elevated. I recommend increasing your atorvastatin (Lipitor) to 40 mg daily. Your Holland risk score is 27.3%. This is based off of the values below and gives us an estimate on your risk of major heart even or stroke in the next 10 years. Increasing your Lipitor will help to reduce this risk. Quitting smoking would really help to reduce this risk. I sent a new prescription to the [facility] in [place]. You can finish your current supply by taking 2, 20 mg tablets daily.     The 10-year ASCVD risk score (Littlefieldmike PEREZ Jr., et al., 2013) is: 27.3%     Values used to calculate the score:       Age: 58 years       Sex: Male       Is Non- : No       Diabetic: Yes       Tobacco smoker: Yes       Systolic Blood Pressure: 127 mmHg       Is BP treated: Yes       HDL Cholesterol: 37 mg/dL       Total Cholesterol: 181 mg/dL       Please call the clinic if you have any concerns.     Results for orders placed or performed in visit on 03/15/19   Hemoglobin A1c   Result Value Ref Range    Hemoglobin A1C 7.0 (H) 0 - 5.6 %   Lipid panel reflex to direct LDL Fasting   Result Value Ref Range    Cholesterol 181 <200 mg/dL    Triglycerides 124 <150 mg/dL    HDL Cholesterol 37 (L) >39 mg/dL    LDL Cholesterol Calculated 119 (H) <100 mg/dL    Non HDL Cholesterol 144 (H) <130 mg/dL   Basic metabolic panel   Result Value Ref Range    Sodium 140 133 - 144 mmol/L    Potassium 4.5 3.4 - 5.3 mmol/L    Chloride 105 94 - 109 mmol/L    Carbon Dioxide 28 20 - 32 mmol/L    Anion Gap 7 3 - 14 mmol/L    Glucose  164 (H) 70 - 99 mg/dL    Urea Nitrogen 12 7 - 30 mg/dL    Creatinine 0.87 0.66 - 1.25 mg/dL    GFR Estimate >90 >60 mL/min/[1.73_m2]    GFR Estimate If Black >90 >60 mL/min/[1.73_m2]    Calcium 9.0 8.5 - 10.1 mg/dL   PSA, screen   Result Value Ref Range    PSA 2.38 0 - 4 ug/L       If you have any questions please call the clinic at [phone].    Sincerely,    Sara ALEXANDRE CNP  LMD

## 2019-03-15 NOTE — PROGRESS NOTES
"SUBJECTIVE:   CC: Zachary Chand is an 58 year old male who presents for preventative health visit.     Physical     {Add if <65 person on Medicare  - Required Questions (Optional):027218}  {Outside tests to abstract? :797719}    {additional problems to add (Optional):492982}    Today's PHQ-2 Score:   PHQ-2 ( 1999 Pfizer) 3/15/2019   Q1: Little interest or pleasure in doing things -   Q2: Feeling down, depressed or hopeless -   PHQ-2 Score -   PHQ-2 Score Incomplete       Abuse: Current or Past(Physical, Sexual or Emotional)- {YES/NO/NA:704993}  Do you feel safe in your environment? {YES/NO/NA:904110}    Social History     Tobacco Use     Smoking status: Current Every Day Smoker     Packs/day: 1.00     Types: Cigarettes     Smokeless tobacco: Never Used   Substance Use Topics     Alcohol use: Yes     Comment: 1-5 daily     No flowsheet data found.{add AUDIT responses (Optional) (A score of 7 for adult men is an indication of hazardous drinking; a score of 8 or more is an indication of an alcohol use disorder.  A score of 7 or more for adult women is an indication of hazardous drinking or an alchohol use disorder):050993}    Last PSA:   PSA   Date Value Ref Range Status   01/03/2011 1.34 0 - 4 ug/L Final       Reviewed orders with patient. Reviewed health maintenance and updated orders accordingly - {Yes/No:217929::\"Yes\"}  {Chronicprobdata (Optional):322950}    Reviewed and updated as needed this visit by clinical staff         Reviewed and updated as needed this visit by Provider        {HISTORY OPTIONS (Optional):423951}    Review of Systems  {MALE ROS (Optional):750560::\"CONSTITUTIONAL: NEGATIVE for fever, chills, change in weight\",\"INTEGUMENTARY/SKIN: NEGATIVE for worrisome rashes, moles or lesions\",\"EYES: NEGATIVE for vision changes or irritation\",\"ENT: NEGATIVE for ear, mouth and throat problems\",\"RESP: NEGATIVE for significant cough or SOB\",\"CV: NEGATIVE for chest pain, palpitations or peripheral edema\",\"GI: " "NEGATIVE for nausea, abdominal pain, heartburn, or change in bowel habits\",\" male: negative for dysuria, hematuria, decreased urinary stream, erectile dysfunction, urethral discharge\",\"MUSCULOSKELETAL: NEGATIVE for significant arthralgias or myalgia\",\"NEURO: NEGATIVE for weakness, dizziness or paresthesias\",\"PSYCHIATRIC: NEGATIVE for changes in mood or affect\"}    OBJECTIVE:   There were no vitals taken for this visit.    Physical Exam  {Exam Choices (Optional):196014}    {Diagnostic Test Results (Optional):805249::\"Diagnostic Test Results:\",\"none \"}    ASSESSMENT/PLAN:   {Diag Picklist:423939}    COUNSELING:   {MALE COUNSELING MESSAGES:901757::\"Reviewed preventive health counseling, as reflected in patient instructions\"}    BP Readings from Last 1 Encounters:   11/12/18 (!) 170/109     Estimated body mass index is 31.87 kg/m  as calculated from the following:    Height as of 1/8/18: 1.829 m (6').    Weight as of 1/8/18: 106.6 kg (235 lb).    {BP Counseling- Complete if BP >= 120/80  (Optional):404865}  {Weight Management Plan (ACO) Complete if BMI is abnormal-  Ages 18-64  BMI >24.9.  Age 65+ with BMI <23 or >30 (Optional):953689}     reports that he has been smoking cigarettes.  He has been smoking about 1.00 pack per day. he has never used smokeless tobacco.  {Tobacco Cessation -- Complete if patient is a smoker (Optional):012114}    Counseling Resources:  ATP IV Guidelines  Pooled Cohorts Equation Calculator  FRAX Risk Assessment  ICSI Preventive Guidelines  Dietary Guidelines for Americans, 2010  USDA's MyPlate  ASA Prophylaxis  Lung CA Screening    BALDOMERO Herrera [facility]  "

## 2019-03-15 NOTE — PROGRESS NOTES
"  SUBJECTIVE:   CC: Zachary Chand is an 58 year old male who presents for preventive health visit.     Healthy Habits:    Do you get at least three servings of calcium containing foods daily (dairy, green leafy vegetables, etc.)? yes    Amount of exercise or daily activities, outside of work: 0 day(s) per week    Problems taking medications regularly No    Medication side effects: No    Have you had an eye exam in the past two years? yes    Do you see a dentist twice per year? Once a year    Do you have sleep apnea, excessive snoring or daytime drowsiness?yes    Last A1c 9.6% Nov 2018  Resumed glipizide  Cut out sweets  Feels symptoms glucose < 90 or > 200    Left hand pain x2-3 weeks  2nd and 3rd PIP joints  No injury  No redness or warmth   Has not taken anything for it    Smoking 1 ppd  Approximately 25 years  Tried patch in the past. Skin irritation  May have tried Chantix    Wheeze in [place] detected on exam  Denies wheeze  Reports having spirometry at work and \"passing\"        Today's PHQ-2 Score:   PHQ-2 ( 1999 Pfizer) 3/15/2019 10/31/2017   Q1: Little interest or pleasure in doing things 0 0   Q2: Feeling down, depressed or hopeless 0 0   PHQ-2 Score 0 0   PHQ-2 Score Incomplete -       Abuse: Current or Past(Physical, Sexual or Emotional)- No  Do you feel safe in your environment? Yes    Social History     Tobacco Use     Smoking status: Current Every Day Smoker     Packs/day: 1.00     Types: Cigarettes     Smokeless tobacco: Never Used   Substance Use Topics     Alcohol use: Yes     Comment: 1-5 daily     If you drink alcohol do you typically have >3 drinks per day or >7 drinks per week? No                      Last PSA:   PSA   Date Value Ref Range Status   01/03/2011 1.34 0 - 4 ug/L Final       Reviewed orders with patient. Reviewed health maintenance and updated orders accordingly - Yes  Labs reviewed in EPIC  BP Readings from Last 3 Encounters:   03/15/19 127/75   11/12/18 (!) 170/109   01/08/18 135/84 "    Wt Readings from Last 3 Encounters:   03/15/19 103.4 kg (228 lb)   01/08/18 106.6 kg (235 lb)   10/31/17 109.4 kg (241 lb 4 oz)                  Patient Active Problem List   Diagnosis     Chronic daily headache     Venous stasis ulcers (H)     Type 2 diabetes, HbA1c goal < 7% (H)     Hyperlipidemia LDL goal <100     Hypertension goal BP (blood pressure) < 140/90     Venous insufficiency     Bee sting allergy     Contusion of bone     Varicose veins     Tobacco abuse     Obesity     Type 2 diabetes mellitus without complication, without long-term current use of insulin (H)     Past Surgical History:   Procedure Laterality Date     HERNIA REPAIR  1997    bilateral groin     ORTHOPEDIC SURGERY  1977    right shoulder dislocation       Social History     Tobacco Use     Smoking status: Current Every Day Smoker     Packs/day: 1.00     Types: Cigarettes     Smokeless tobacco: Never Used   Substance Use Topics     Alcohol use: Yes     Comment: 1-5 daily     Family History   Problem Relation Age of Onset     Alcohol/Drug Father            Reviewed and updated as needed this visit by clinical staff         Reviewed and updated as needed this visit by Provider        Past Medical History:   Diagnosis Date     Arthritis      Chronic daily headache 3/24/2011     ED (erectile dysfunction)      Hyperlipidemia LDL goal <100 8/2/2012     Hypertension goal BP (blood pressure) < 140/90 8/2/2012     Smoker 8/2/2012     Type 2 diabetes, HbA1c goal < 7% (H) 8/2/2012     Venous stasis ulcers (H) 7/26/2012        ROS:  CONSTITUTIONAL: NEGATIVE for fever, chills, change in weight  INTEGUMENTARY/SKIN: NEGATIVE for worrisome rashes, moles or lesions  EYES: NEGATIVE for vision changes or irritation  ENT: NEGATIVE for ear, mouth and throat problems  RESP: NEGATIVE for significant cough or SOB  CV: NEGATIVE for chest pain, palpitations or peripheral edema  GI: NEGATIVE for nausea, abdominal pain, heartburn, or change in bowel habits    male: negative for dysuria, hematuria, decreased urinary stream, erectile dysfunction, urethral discharge  MUSCULOSKELETAL:see HPI  NEURO: NEGATIVE for weakness, dizziness or paresthesias  PSYCHIATRIC: NEGATIVE for changes in mood or affect    OBJECTIVE:   /75 (BP Location: Right arm, Patient Position: Chair, Cuff Size: Adult Regular)   Temp 97.7  F (36.5  C) (Oral)   Wt 103.4 kg (228 lb)   SpO2 98%   BMI 30.92 kg/m    EXAM:  GENERAL: healthy, alert and no distress  EYES: Eyes grossly normal to inspection, PERRL and conjunctivae and sclerae normal  HENT: ear canals and TM's normal, nose and mouth without ulcers or lesions  NECK: no adenopathy, no asymmetry, masses, or scars and thyroid normal to palpation  RESP: Left prolonged expiratory wheeze  CV: regular rate and rhythm, normal S1 S2, no S3 or S4, no murmur, click or rub, no peripheral edema and peripheral pulses strong  ABDOMEN: soft, nontender, no hepatosplenomegaly, no masses and bowel sounds normal  MS: Left 2nd and 3rd PIP swelling without tenderness, warmth or swelling. ROM intact  SKIN: no suspicious lesions or rashes  NEURO: Normal strength and tone, mentation intact and speech normal  PSYCH: mentation appears normal, affect normal/bright    Diagnostic Test Results:  none     ASSESSMENT/PLAN:   1. Encounter for preventative adult health care examination    2. Type 2 diabetes mellitus with hyperglycemia, without long-term current use of insulin (H)  - Not true hypoglycemia with glucose of 89. May be experiencing symptoms of blurred vision d/t drop in glucose. Also recommend getting annual eye exam  - metFORMIN (GLUCOPHAGE) 1000 MG tablet; Take 1 tablet twice daily with food  Dispense: 180 tablet; Refill: 1  - glipiZIDE (GLUCOTROL) 5 MG tablet; TAKE 1 TABLET BY MOUTH TWICE DAILY BEFORE A MEAL  Dispense: 180 tablet; Refill: 1  - Hemoglobin A1c  - Lipid panel reflex to direct LDL Fasting  - Basic metabolic panel  - atorvastatin (LIPITOR) 20 MG  tablet; Take 1 tablet (20 mg) by mouth daily  Dispense: 90 tablet; Refill: 3    3. Hypertension goal BP (blood pressure) < 140/90  - Well controlled. Continue with same medication    4. Hyperlipidemia LDL goal <100  - Not on statin. Looks like it may have been DC'd Nov 2018 when he presented with myalgias. Will resume. Advised to notify me if he develops muscles aches/weakness. Could try pravastatin. Could also try every other day dosing    5. Special screening for malignant neoplasms, colon  - Fecal colorectal cancer screen FIT; Future    6. Tobacco abuse  - Discussed smoking cessation. Discussed risk for lung cancer, COPD. Further discussion of COPD d/t wheeze. He will try gum. Call clinic in future if would like to try PO medication  - nicotine (NICORETTE) 2 MG gum; Place 1 each (2 mg) inside cheek as needed for smoking cessation  Dispense: 50 each; Refill: 11    7. Screening for prostate cancer  - PSA, screen    8. Pain of left hand  - Likely arthritis      COUNSELING:  Reviewed preventive health counseling, as reflected in patient instructions       Consider AAA screening for ages 65-75 and smoking history       Regular exercise       Healthy diet/nutrition       Colon cancer screening       Prostate cancer screening    BP Readings from Last 1 Encounters:   03/15/19 127/75     Estimated body mass index is 31.87 kg/m  as calculated from the following:    Height as of 1/8/18: 1.829 m (6').    Weight as of 1/8/18: 106.6 kg (235 lb).           reports that he has been smoking cigarettes.  He has been smoking about 1.00 pack per day. he has never used smokeless tobacco.  Tobacco Cessation Action Plan: see above    Counseling Resources:  ATP IV Guidelines  Pooled Cohorts Equation Calculator  FRAX Risk Assessment  ICSI Preventive Guidelines  Dietary Guidelines for Americans, 2010  [employer]'s MyPlate  ASA Prophylaxis  Lung CA Screening    BALDOMERO Herrera CNP  [facility]

## 2019-03-15 NOTE — PATIENT INSTRUCTIONS
Schedule your annual eye exam    Check our [email or web address]  There are also medications you can take to help reduce cravings (Bupropion or Chantix)    Take 400 mg ibuprofen (with food) three times daily for three days to bring the inflammation down. After that, you can just take as needed for pain  Please complete the stool test and mail into clinic      Preventive Health Recommendations  Male Ages 50 - 64    Yearly exam:             See your health care provider every year in order to  o   Review health changes.   o   Discuss preventive care.    o   Review your medicines if your doctor has prescribed any.     Have a cholesterol test every 5 years, or more frequently if you are at risk for high cholesterol/heart disease.     Have a diabetes test (fasting glucose) every three years. If you are at risk for diabetes, you should have this test more often.     Have a colonoscopy at age 50, or have a yearly FIT test (stool test). These exams will check for colon cancer.      Talk with your health care provider about whether or not a prostate cancer screening test (PSA) is right for you.    You should be tested each year for STDs (sexually transmitted diseases), if you re at risk.     Shots: Get a flu shot each year. Get a tetanus shot every 10 years.     Nutrition:    Eat at least 5 servings of fruits and vegetables daily.     Eat whole-grain bread, whole-wheat pasta and brown rice instead of white grains and rice.     Get adequate Calcium and Vitamin D.     Lifestyle    Exercise for at least 150 minutes a week (30 minutes a day, 5 days a week). This will help you control your weight and prevent disease.     Limit alcohol to one drink per day.     No smoking.     Wear sunscreen to prevent skin cancer.     See your dentist every six months for an exam and cleaning.     See your eye doctor every 1 to 2 years.    Preventive Health Recommendations  Male Ages 50 - 64    Yearly exam:             See your health care provider every  year in order to  o   Review health changes.   o   Discuss preventive care.    o   Review your medicines if your doctor has prescribed any.     Have a cholesterol test every 5 years, or more frequently if you are at risk for high cholesterol/heart disease.     Have a diabetes test (fasting glucose) every three years. If you are at risk for diabetes, you should have this test more often.     Have a colonoscopy at age 50, or have a yearly FIT test (stool test). These exams will check for colon cancer.      Talk with your health care provider about whether or not a prostate cancer screening test (PSA) is right for you.    You should be tested each year for STDs (sexually transmitted diseases), if you re at risk.     Shots: Get a flu shot each year. Get a tetanus shot every 10 years.     Nutrition:    Eat at least 5 servings of fruits and vegetables daily.     Eat whole-grain bread, whole-wheat pasta and brown rice instead of white grains and rice.     Get adequate Calcium and Vitamin D.     Lifestyle    Exercise for at least 150 minutes a week (30 minutes a day, 5 days a week). This will help you control your weight and prevent disease.     Limit alcohol to one drink per day.     No smoking.     Wear sunscreen to prevent skin cancer.     See your dentist every six months for an exam and cleaning.     See your eye doctor every 1 to 2 years.    Preventive Health Recommendations  Male Ages 50 - 64    Yearly exam:             See your health care provider every year in order to  o   Review health changes.   o   Discuss preventive care.    o   Review your medicines if your doctor has prescribed any.     Have a cholesterol test every 5 years, or more frequently if you are at risk for high cholesterol/heart disease.     Have a diabetes test (fasting glucose) every three years. If you are at risk for diabetes, you should have this test more often.     Have a colonoscopy at age 50, or have a yearly FIT test (stool test). These  exams will check for colon cancer.      Talk with your health care provider about whether or not a prostate cancer screening test (PSA) is right for you.    You should be tested each year for STDs (sexually transmitted diseases), if you re at risk.     Shots: Get a flu shot each year. Get a tetanus shot every 10 years.     Nutrition:    Eat at least 5 servings of fruits and vegetables daily.     Eat whole-grain bread, whole-wheat pasta and brown rice instead of white grains and rice.     Get adequate Calcium and Vitamin D.     Lifestyle    Exercise for at least 150 minutes a week (30 minutes a day, 5 days a week). This will help you control your weight and prevent disease.     Limit alcohol to one drink per day.     No smoking.     Wear sunscreen to prevent skin cancer.     See your dentist every six months for an exam and cleaning.     See your eye doctor every 1 to 2 years.

## 2019-03-15 NOTE — TELEPHONE ENCOUNTER
Reason for Call:  Other prescription    Detailed comments: Lisette Roblero calling to clarify directions on nicotine gum. She needs a frequency on the directions for insurance purposes to calculate days supply that patient will need. Please send updated script.    Phone Number Patient can be reached at: Other phone number:  [phone]    Best Time: any    Can we leave a detailed message on this number? YES    Call taken on 3/15/2019 at 9:19 AM by Rosalie Shea

## 2019-03-18 DIAGNOSIS — E11.65 TYPE 2 DIABETES MELLITUS WITH HYPERGLYCEMIA, WITHOUT LONG-TERM CURRENT USE OF INSULIN (H): ICD-10-CM

## 2019-03-18 RX ORDER — ATORVASTATIN CALCIUM 40 MG/1
40 TABLET, FILM COATED ORAL DAILY
Qty: 90 TABLET | Refills: 3 | Status: SHIPPED | OUTPATIENT
Start: 2019-03-18 | End: 2020-03-13

## 2019-03-18 NOTE — RESULT ENCOUNTER NOTE
[address] [place] [address]  Phone: [phone]  Fax: [phone]      03/18/19    Zachary Chand  [address] [place] [address]      Dear Zachary,    The results of your recent labs are enclosed.  Your A1c is 7.0% which is a great improvement since last check. Keep up the good work.   Your prostate cancer screening test (PSA) was negative.   Your cholesterol is elevated. I recommend increasing your atorvastatin (Lipitor) to 40 mg daily. Your [place] risk score is 27.3%. This is based off of the values below and gives us an estimate on your risk of major heart even or stroke in the next 10 years. Increasing your Lipitor will help to reduce this risk. Quitting smoking would really help to reduce this risk. I sent a new prescription to the [facility] in [place]. You can finish your current supply by taking 2, 20 mg tablets daily.     The 10-year ASCVD risk score (Eden DC Jr., et al., 2013) is: 27.3%    Values used to calculate the score:      Age: 58 years      Sex: Male      Is Non- : No      Diabetic: Yes      Tobacco smoker: Yes      Systolic Blood Pressure: 127 mmHg      Is BP treated: Yes      HDL Cholesterol: 37 mg/dL      Total Cholesterol: 181 mg/dL      Please call the clinic if you have any concerns.    Sincerely,    BALDOMERO Herrera CNP    Your [facility] Care Team

## 2019-05-05 DIAGNOSIS — E11.65 TYPE 2 DIABETES MELLITUS WITH HYPERGLYCEMIA, WITHOUT LONG-TERM CURRENT USE OF INSULIN (H): ICD-10-CM

## 2019-05-06 NOTE — TELEPHONE ENCOUNTER
"Requested Prescriptions   Pending Prescriptions Disp Refills     metFORMIN (GLUCOPHAGE) 1000 MG tablet [Pharmacy Med Name: METFORMIN 1000MG TABLETS] 60 tablet 0     Sig: TAKE 1 TABLET BY MOUTH TWICE DAILY WITH FOOD   Last Written Prescription Date:  3/15/19  Last Fill Quantity: 180,  # refills: 1   Last office visit: 3/15/2019 with prescribing provider:     Future Office Visit:        Biguanide Agents Failed - 5/5/2019  3:10 AM        Failed - Patient has had a Microalbumin in the past 15 mos.     Recent Labs   Lab Test 01/08/18  0910   MICROL 12   UMALCR 6.33             Passed - Blood pressure less than 140/90 in past 6 months     BP Readings from Last 3 Encounters:   03/15/19 127/75   11/12/18 (!) 170/109   01/08/18 135/84                 Passed - Patient has documented LDL within the past 12 mos.     Recent Labs   Lab Test 03/15/19  0736   *             Passed - Patient is age 10 or older        Passed - Patient has documented A1c within the specified period of time.     If HgbA1C is 8 or greater, it needs to be on file within the past 3 months.  If less than 8, must be on file within the past 6 months.     Recent Labs   Lab Test 03/15/19  0736   A1C 7.0*             Passed - Patient's CR is NOT>1.4 OR Patient's EGFR is NOT<45 within past 12 mos.     Recent Labs   Lab Test 03/15/19  0736   GFRESTIMATED >90   GFRESTBLACK >90       Recent Labs   Lab Test 03/15/19  0736   CR 0.87             Passed - Patient does NOT have a diagnosis of CHF.        Passed - Medication is active on med list        Passed - Recent (6 mo) or future (30 days) visit within the authorizing provider's specialty     Patient had office visit in the last 6 months or has a visit in the next 30 days with authorizing provider or within the authorizing provider's specialty.  See \"Patient Info\" tab in inbasket, or \"Choose Columns\" in Meds & Orders section of the refill encounter.              "

## 2019-05-07 NOTE — TELEPHONE ENCOUNTER
"Spoke to pharmacy and verified patient has fills left; may disregard request.    \"Refusal\" routed back to pharmacy with note.    Gabi Lee RN  [facility]      "

## 2019-10-26 DIAGNOSIS — E11.65 TYPE 2 DIABETES MELLITUS WITH HYPERGLYCEMIA, WITHOUT LONG-TERM CURRENT USE OF INSULIN (H): ICD-10-CM

## 2019-10-28 NOTE — TELEPHONE ENCOUNTER
Routing refill request to provider for review/approval because:  Failed protocols: see below  Advised to follow up June 2019- no appointment made.     Jada Burnett RN

## 2019-10-28 NOTE — TELEPHONE ENCOUNTER
"Requested Prescriptions   Pending Prescriptions Disp Refills     metFORMIN (GLUCOPHAGE) 1000 MG tablet [Pharmacy Med Name: METFORMIN 1000MG TABLETS] 180 tablet 0     Sig: TAKE 1 TABLET BY MOUTH TWICE DAILY WITH FOOD   Last Written Prescription Date:  3/15/19  Last Fill Quantity: 180,  # refills: 1   Last office visit: 3/15/2019 with prescribing provider:     Future Office Visit:        Biguanide Agents Failed - 10/26/2019  1:20 PM        Failed - Blood pressure less than 140/90 in past 6 months     BP Readings from Last 3 Encounters:   03/15/19 127/75   11/12/18 (!) 170/109   01/08/18 135/84                 Failed - Patient has had a Microalbumin in the past 15 mos.     Recent Labs   Lab Test 01/08/18  0910   MICROL 12   UMALCR 6.33             Failed - Patient has documented A1c within the specified period of time.     If HgbA1C is 8 or greater, it needs to be on file within the past 3 months.  If less than 8, must be on file within the past 6 months.     Recent Labs   Lab Test 03/15/19  0736   A1C 7.0*             Failed - Recent (6 mo) or future (30 days) visit within the authorizing provider's specialty     Patient had office visit in the last 6 months or has a visit in the next 30 days with authorizing provider or within the authorizing provider's specialty.  See \"Patient Info\" tab in inbasket, or \"Choose Columns\" in Meds & Orders section of the refill encounter.            Passed - Patient has documented LDL within the past 12 mos.     Recent Labs   Lab Test 03/15/19  0736   *             Passed - Patient is age 10 or older        Passed - Patient's CR is NOT>1.4 OR Patient's EGFR is NOT<45 within past 12 mos.     Recent Labs   Lab Test 03/15/19  0736   GFRESTIMATED >90   GFRESTBLACK >90       Recent Labs   Lab Test 03/15/19  0736   CR 0.87             Passed - Patient does NOT have a diagnosis of CHF.        Passed - Medication is active on med list          "

## 2020-03-12 ENCOUNTER — TELEPHONE (OUTPATIENT)
Dept: FAMILY MEDICINE | Facility: CLINIC | Age: 60
End: 2020-03-12

## 2020-03-12 NOTE — TELEPHONE ENCOUNTER
Patient has an appointment scheduled for tomorrow - closing TE.      Azeb Mac RN  [facility]

## 2020-03-12 NOTE — TELEPHONE ENCOUNTER
Reason for Call:  Other appointment    Detailed comments: Pt would like a call back from nurse. Pt missed appt today and would like to get in asap.    Phone Number Patient can be reached at: Home number on file [phone] (home)    Best Time: ASAP    Can we leave a detailed message on this number? NO    Call taken on 3/12/2020 at 11:40 AM by Karla Kathleen

## 2020-03-13 ENCOUNTER — OFFICE VISIT (OUTPATIENT)
Dept: FAMILY MEDICINE | Facility: CLINIC | Age: 60
End: 2020-03-13
Payer: COMMERCIAL

## 2020-03-13 VITALS
DIASTOLIC BLOOD PRESSURE: 72 MMHG | SYSTOLIC BLOOD PRESSURE: 136 MMHG | HEART RATE: 113 BPM | OXYGEN SATURATION: 97 % | RESPIRATION RATE: 20 BRPM | BODY MASS INDEX: 31.09 KG/M2 | TEMPERATURE: 97.6 F | WEIGHT: 229.2 LBS

## 2020-03-13 DIAGNOSIS — E11.628 DIABETIC INFECTION OF RIGHT FOOT (H): ICD-10-CM

## 2020-03-13 DIAGNOSIS — E11.65 TYPE 2 DIABETES MELLITUS WITH HYPERGLYCEMIA, WITHOUT LONG-TERM CURRENT USE OF INSULIN (H): ICD-10-CM

## 2020-03-13 DIAGNOSIS — I10 HYPERTENSION GOAL BP (BLOOD PRESSURE) < 140/90: ICD-10-CM

## 2020-03-13 DIAGNOSIS — L08.9 DIABETIC INFECTION OF RIGHT FOOT (H): ICD-10-CM

## 2020-03-13 DIAGNOSIS — F17.200 TOBACCO USE DISORDER: ICD-10-CM

## 2020-03-13 DIAGNOSIS — I83.813 VARICOSE VEINS OF BOTH LOWER EXTREMITIES WITH PAIN: Primary | ICD-10-CM

## 2020-03-13 LAB
ANION GAP SERPL CALCULATED.3IONS-SCNC: 8 MMOL/L (ref 3–14)
BUN SERPL-MCNC: 15 MG/DL (ref 7–30)
CALCIUM SERPL-MCNC: 9.1 MG/DL (ref 8.5–10.1)
CHLORIDE SERPL-SCNC: 102 MMOL/L (ref 94–109)
CO2 SERPL-SCNC: 25 MMOL/L (ref 20–32)
CREAT SERPL-MCNC: 0.81 MG/DL (ref 0.66–1.25)
CREAT UR-MCNC: 139 MG/DL
GFR SERPL CREATININE-BSD FRML MDRD: >90 ML/MIN/{1.73_M2}
GLUCOSE SERPL-MCNC: 297 MG/DL (ref 70–99)
HBA1C MFR BLD: 10.2 % (ref 0–5.6)
MICROALBUMIN UR-MCNC: 14 MG/L
MICROALBUMIN/CREAT UR: 10.22 MG/G CR (ref 0–17)
POTASSIUM SERPL-SCNC: 3.9 MMOL/L (ref 3.4–5.3)
SODIUM SERPL-SCNC: 135 MMOL/L (ref 133–144)

## 2020-03-13 PROCEDURE — 83036 HEMOGLOBIN GLYCOSYLATED A1C: CPT | Performed by: NURSE PRACTITIONER

## 2020-03-13 PROCEDURE — 99207 C FOOT EXAM  NO CHARGE: CPT | Performed by: NURSE PRACTITIONER

## 2020-03-13 PROCEDURE — 80048 BASIC METABOLIC PNL TOTAL CA: CPT | Performed by: NURSE PRACTITIONER

## 2020-03-13 PROCEDURE — 82043 UR ALBUMIN QUANTITATIVE: CPT | Performed by: NURSE PRACTITIONER

## 2020-03-13 PROCEDURE — 99214 OFFICE O/P EST MOD 30 MIN: CPT | Performed by: NURSE PRACTITIONER

## 2020-03-13 PROCEDURE — 36415 COLL VENOUS BLD VENIPUNCTURE: CPT | Performed by: NURSE PRACTITIONER

## 2020-03-13 RX ORDER — PREDNISONE 20 MG/1
20 TABLET ORAL DAILY
Qty: 5 TABLET | Refills: 0 | Status: SHIPPED | OUTPATIENT
Start: 2020-03-13 | End: 2020-03-31

## 2020-03-13 RX ORDER — GLIPIZIDE 5 MG/1
5 TABLET, FILM COATED, EXTENDED RELEASE ORAL DAILY
Qty: 90 TABLET | Refills: 0 | Status: SHIPPED | OUTPATIENT
Start: 2020-03-13 | End: 2020-07-07

## 2020-03-13 RX ORDER — LISINOPRIL 10 MG/1
10 TABLET ORAL DAILY
Qty: 90 TABLET | Refills: 1 | Status: SHIPPED | OUTPATIENT
Start: 2020-03-13 | End: 2020-07-07

## 2020-03-13 RX ORDER — ATORVASTATIN CALCIUM 40 MG/1
40 TABLET, FILM COATED ORAL DAILY
Qty: 90 TABLET | Refills: 1 | Status: SHIPPED | OUTPATIENT
Start: 2020-03-13 | End: 2020-04-30

## 2020-03-13 NOTE — LETTER
[facility]  [address]  [place] [address]  [phone]          March 13, 2020    RE:  Zachary Chand                                                                                                                                                       [address]  [place] [address]            To whom it may concern:    Zachary Chand is under my professional care, seen in clinic 3/13/20.  He is unable to work at this time due to significant leg pain.  Please excuse his work absence.     Sincerely,        Ann Vizcaino RN, CNP

## 2020-03-13 NOTE — PATIENT INSTRUCTIONS
Patient Education     Discharge Instructions for Diabetic Foot Pressure Injuries  You have been diagnosed with pressure injuries of the foot related to diabetes. Diabetes is a disease that makes it very hard to control your blood sugar. One dangerous complication of diabetes is a higher risk of developing serious foot problems. Wounds, even minor ones, can easily become infected when you have diabetes. These infections can become life-threatening if they aren't treated. Infections that settle in the bones can also spread throughout your foot and leg, destroying bone as they travel.    Your healthcare provider wants you to practice good diabetic foot care. This can help make sure that hot spots, small cracks, or sores are treated before they get infected. If infection is already present, medicines may be prescribed. Follow the tips on this sheet to take better care of your feet.  After surgery  If you have had surgery, change your dressings every 12 hours to prevent infection. Regular wound care helps keep your foot free of infection and aids healing:    Wash your hands.    Put on disposable gloves if your foot is infected.    Gently remove the old dressing and discard it in a plastic bag.    Take off the gloves.    Wash your hands again.    Put on new gloves.    Clean and dress the wound as directed by your healthcare provider.    Discard any used materials or trash in a plastic bag before placing in a trash can.    Dispose of sharp objects in specially designated sharps containers.  Daily foot check   Here is what you can do:    With a mirror, look at the bottom of your feet every day. This way, you can catch small skin changes before they turn into bigger problems, such as pressure injuries, or before they become infected.    Call your healthcare provider right away if you notice any of the following: hot spots, red streaks, swelling, cracks, sores, injuries, or foreign objects embedded in your foot.    Before  putting on shoes, check the soles and insides for robel or splinters. Remove these as they could break the skin or put added pressure on your feet.  Foot care  Suggestions include the following:     Wash your feet every day; use lukewarm (not hot) water and mild soap. Make sure to wash between your toes.    Use a soft towel to dry your feet well, especially between the toes. Pat gently; don't rub.    Apply a cream or lanolin lotion, especially on the heels, to keep the skin smooth. If it is cracked, talk to your healthcare provider about how to treat it. Do not apply lotion between the toes as this can lead to fungal infections.     Dust your feet with nonmedicated powder before putting on shoes, socks, or stockings. This will help keep them dry.    Before putting on your shoes, check your socks to make sure they are not bunched up. Also make sure they don't have folds or creases in them. Even little bumps created by bunched socks can cause a serious foot wound.     Talk to your healthcare provider before treating calluses, corns, or bunions.    To prevent ingrown toenails, cut toenails straight across. Try soaking your toenails in warm water to soften them before cutting.    Try to keep your feet from getting too hot or too cold.    Don't go barefoot.    Avoid rough surfaces or surfaces with sharp objects.    Don't wear shoes that are too tight or uncomfortable.    Don't test the temperature of the bathtub water with your feet if you have diminished sensation.    Follow your healthcare provider s instructions about walking and other activity. There may be some restrictions depending on the condition of your feet. You may need special shoes or inserts to take the pressure off the ulcers.     Take all medicines exactly as directed.  Follow-Up  Your healthcare provider may refer you to a special wound-care center for treatment.     When to call your healthcare provider  Call your healthcare provider if you have any  of the following:    Fever of 100.4 F (38 C) or higher, or as directed by your healthcare provider     Redness, swelling, or pain in the foot that doesn't go away    Numbness or tingling in any part of your foot    Chills, light-headedness, or fainting    Odor from wounds or swollen areas   Date Last Reviewed: 6/1/2016 2000-2019 [employer]. [address] [place] [address]. All rights reserved. This information is not intended as a substitute for professional medical care. Always follow your healthcare professional's instructions.           Patient Education     Deep Vein Thrombosis (DVT)    Deep vein thrombosis (DVT) occurs when a blood clot (thrombus) forms in a deep vein. This happens most often in the leg. It can also happen in the arms or other parts of the body. A part of the clot called an embolus can break off and travel to the lungs. When this happens, it s called a pulmonary embolism (PE). PE is a medical emergency. It can cut off blood flow and lead to death. Both DVT and PE are closely related. Together, they are often referred to by the term venous thromboembolism (VTE).  Risk factors for DVT  Anything that slows blood flow, injures the lining of a vein, or increases blood clotting can make you more prone to having DVT. This includes the following:    Long periods without movement (such as when sitting for many hours at a time or when recovering from major surgery or illness)    Estrogen (female hormone) therapy, such as hormone replacement therapy (HRT) or birth control pills    Fractured hip or leg    Major surgery or joint replacement    Major trauma or spinal cord injury    Cancer    Family history    Excess weight or obesity    Smoking    Older age  Symptoms  DVT does not always cause symptoms. When symptoms do occur, they may appear around the site of the DVT, such as in the leg. Possible symptoms include:    Swelling    Pain    Warmth    Redness    Tenderness  Home  care    You were likely prescribed blood thinners (anticoagulants). They may be given as pills (oral) or shots (injections). Follow all instructions when using these medicines. Note: Don't take blood thinners with other medicines, herbal remedies, or supplements without talking to your provider first. Certain medicines or products can affect how blood thinners work.    Follow your provider s instructions about activity and rest.    If support or compression stockings are prescribed, wear them as directed. These may help improve blood flow in the legs.    When sitting or lying down, move your ankles, toes and knees often. This may also help improve blood flow in the legs.    Follow-up care  Follow up with your healthcare provider, or as advised. If imaging tests were done, they may need further review by a doctor. You will be told of any new findings that may affect your care.  When to seek medical advice  Call your healthcare provider right away if any of these occur:    New or increased swelling, pain, tenderness, warmth, or redness, in the leg, arm, or other area    Blood in the urine    Bleeding with bowel movements  Call 911  Call 911 if any of these occur:    Bleeding from the nose, gums, a cut, or vagina    Heavy or uncontrolled bleeding    Trouble breathing    Chest pain or discomfort that worsens with deep breathing or coughing    Coughing (may cough up blood)    Fast heartbeat    Sweating    Anxiety    Lightheadedness, dizziness, or fainting  Date Last Reviewed: 4/1/2018 2000-2019 [employer]. [address] [place] [address], [place] [address]. All rights reserved. This information is not intended as a substitute for professional medical care. Always follow your healthcare professional's instructions.

## 2020-03-13 NOTE — PROGRESS NOTES
Subjective     Zachary Chand is a 59 year old male who presents to clinic today for the following health issues:    HPI   Diabetes Follow-up    How often are you checking your blood sugar? Three times daily  Blood sugar testing frequency justification:  concern over infection in foot.  Has had infections in the past similar to current symptoms- pain, warmth. Hx of leg injury as a child, and hx of varicose veins, spider veins, and superficial blood clots. Pain is in the ball of his foot- R.  Denies hx of gout.  Denies foot injury.  What time of day are you checking your blood sugars (select all that apply)?  varies , for sure in the morning  Have you had any blood sugars above 200?  Yes   Have you had any blood sugars below 70?  Yes     What symptoms do you notice when your blood sugar is low?  Shaky and Blurred vision    What concerns do you have today about your diabetes? Possible infection in Right leg, neuropathy in both     Do you have any of these symptoms? (Select all that apply)  Numbness in feet, Burning in feet, Redness, sores, or blisters on feet and Blurry vision    Have you had a diabetic eye exam in the last 12 months? No    Needs refill of medications.     BP Readings from Last 2 Encounters:   03/13/20 136/72   03/15/19 127/75     Hemoglobin A1C (%)   Date Value   03/13/2020 10.2 (H)   03/15/2019 7.0 (H)     LDL Cholesterol Calculated (mg/dL)   Date Value   03/15/2019 119 (H)   01/08/2018 127 (H)                 How many servings of fruits and vegetables do you eat daily?  0-1    On average, how many sweetened beverages do you drink each day (Examples: soda, juice, sweet tea, etc.  Do NOT count diet or artificially sweetened beverages)?   0    How many days per week do you exercise enough to make your heart beat faster? 7    How many minutes a day do you exercise enough to make your heart beat faster? 30 - 60  How many days per week do you miss taking your medication? Discontinued metformin because of  side effects, and ran out of BP meds last week    What makes it hard for you to take your medications?  side effects and refill availability    Patient Active Problem List   Diagnosis     Chronic daily headache     Venous stasis ulcers (H)     Type 2 diabetes mellitus with hyperglycemia, without long-term current use of insulin (H)     Hyperlipidemia LDL goal <100     Hypertension goal BP (blood pressure) < 140/90     Venous insufficiency     Bee sting allergy     Contusion of bone     Varicose 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     Tobacco use disorder     Obesity     Type 2 diabetes mellitus without complication, without long-term current use of insulin (H)     Past Surgical History:   Procedure Laterality Date     HERNIA REPAIR  1997    bilateral groin     ORTHOPEDIC SURGERY  1977    right shoulder dislocation       Social History     Tobacco Use     Smoking status: Current Every Day Smoker     Packs/day: 1.00     Types: Cigarettes     Smokeless tobacco: Never Used   Substance Use Topics     Alcohol use: Yes     Comment: 2 per day     Family History   Problem Relation Age of Onset     Alcohol/Drug Father          Current Outpatient Medications   Medication Sig Dispense Refill     amoxicillin-clavulanate (AUGMENTIN) 875-125 MG tablet Take 1 tablet by mouth 2 times daily for 7 days 14 tablet 0     atorvastatin (LIPITOR) 40 MG tablet Take 1 tablet (40 mg) by mouth daily 90 tablet 1     glipiZIDE (GLUCOTROL XL) 5 MG 24 hr tablet Take 1 tablet (5 mg) by mouth daily 90 tablet 0     lisinopril (ZESTRIL) 10 MG tablet Take 1 tablet (10 mg) by mouth daily 90 tablet 1     metFORMIN (GLUCOPHAGE) 1000 MG tablet TAKE 1 TABLET BY MOUTH TWICE DAILY WITH FOOD 180 tablet 1     predniSONE (DELTASONE) 20 MG tablet Take 1 tablet (20 mg) by mouth daily 5 tablet 0     aspirin 81 MG EC tablet Take 1 tablet (81 mg) by mouth daily (Patient not taking: Reported on 3/15/2019) 90 tablet 3     blood glucose monitoring (NO BRAND  SPECIFIED) meter device kit Use to test blood sugar 1 times daily or as directed.    Also refills on strips and lancets, refills for a year 1 kit 11     blood glucose monitoring (NO BRAND SPECIFIED) test strip Use to test blood sugars 2   times daily or as directed 100 strip 1     ibuprofen (ADVIL,MOTRIN) 200 MG tablet Take 200 mg by mouth every 4 hours as needed Reported on 4/14/2017       Allergies   Allergen Reactions     Bee Pollen      Swelling      No Known Drug Allergies      Recent Labs   Lab Test 03/13/20  1448 03/15/19  0736 11/12/18  1021 01/08/18  0858 10/31/17  0729 12/30/16  0700  12/14/15  0719 04/09/13  1427   A1C 10.2* 7.0* 9.6*  --  7.2* 7.9*   < > 10.7* 7.2*   LDL  --  119*  --  127*  --  142*  --   --   --    HDL  --  37*  --  42  --  29*  --   --   --    TRIG  --  124  --  157*  --  137  --   --   --    ALT  --   --  31  --  36 24  --  45  --    CR  --  0.87 0.78 0.78 0.78 0.85  --  0.78  --    GFRESTIMATED  --  >90 >90 >90 >90 >90  Non African American GFR Calc    --  >90  Non  GFR Calc    --    GFRESTBLACK  --  >90 >90 >90 >90 >90  African American GFR Calc    --  >90   GFR Calc    --    POTASSIUM  --  4.5 4.4 4.9 4.5 4.3  --  4.2  --    TSH  --   --   --   --   --   --   --  1.50 2.56    < > = values in this interval not displayed.      BP Readings from Last 3 Encounters:   03/13/20 136/72   03/15/19 127/75   11/12/18 (!) 170/109    Wt Readings from Last 3 Encounters:   03/13/20 104 kg (229 lb 3.2 oz)   03/15/19 103.4 kg (228 lb)   01/08/18 106.6 kg (235 lb)            Reviewed and updated as needed this visit by Provider         Review of Systems   ROS COMP: Constitutional, HEENT, cardiovascular, pulmonary, GI, , musculoskeletal, neuro, skin, endocrine and psych systems are negative, except as otherwise noted.      Objective    /72   Pulse 113   Temp 97.6  F (36.4  C) (Tympanic)   Resp 20   Wt 104 kg (229 lb 3.2 oz)   SpO2 97%   BMI 31.09 kg/m     Body mass index is 31.09 kg/m .  Physical Exam   GENERAL: healthy, alert and no distress  RESP: lungs clear to auscultation - no rales, rhonchi or wheezes  CV: regular rate and rhythm, normal S1 S2, no S3 or S4, no murmur, click or rub, no peripheral edema and peripheral pulses strong  MS: no gross musculoskeletal defects noted, no edema POSITIVE for pain with palpation of fascii on R, slight amount of erythema and warmth to foot. No erythema or warmth to leg.  CMS intact bilaterally.   SKIN: no open wounds to legs bilaterally.  Significant varicose and spider veins bilaterally.   PSYCH: mentation appears normal, affect normal/bright    Diagnostic Test Results:  Labs reviewed in Epic  See orders        Assessment & Plan       ICD-10-CM    1. Varicose 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  I83.813 US Lower Extremity Venous Duplex Right   2. Type 2 diabetes mellitus with hyperglycemia, without long-term current use of insulin (H)  E11.65 Albumin Random Urine Quantitative with Creat Ratio     HEMOGLOBIN A1C     BASIC METABOLIC PANEL     FOOT EXAM     glipiZIDE (GLUCOTROL XL) 5 MG 24 hr tablet     PODIATRY/FOOT & ANKLE SURGERY REFERRAL     metFORMIN (GLUCOPHAGE) 1000 MG tablet     atorvastatin (LIPITOR) 40 MG tablet   3. Diabetic infection of right foot (H)  E11.628 amoxicillin-clavulanate (AUGMENTIN) 875-125 MG tablet    L08.9 predniSONE (DELTASONE) 20 MG tablet     PODIATRY/FOOT & ANKLE SURGERY REFERRAL     US Lower Extremity Venous Duplex Right   4. Tobacco use disorder  F17.200 TOBACCO CESSATION ORDER FOR    5. Hypertension goal BP (blood pressure) < 140/90  I10 lisinopril (ZESTRIL) 10 MG tablet        Tobacco Cessation:   reports that he has been smoking cigarettes. He has been smoking about 1.00 pack per day. He has never used smokeless tobacco.  Tobacco Cessation Action Plan: Information offered: Patient not interested at this time        See Patient Instructions: advised pt to take full course of ABX  with daily yogurt or probiotics.  If symptoms persist, schedule leg ultrasound and schedule with podiatry.  If symptoms worsen, follow up. Pt in agreement to plan.     Return in about 1 week (around 3/20/2020), or if symptoms worsen or fail to improve.    Ann Vizcaino, MIRA  [facility]

## 2020-03-16 NOTE — RESULT ENCOUNTER NOTE
Please send a letter to the patient with the results. Your diabetes has significantly worsened over the last year.  Please take your medication as prescribed.  Follow up as needed.     MIRA Johnson

## 2020-03-23 DIAGNOSIS — E11.65 TYPE 2 DIABETES MELLITUS WITH HYPERGLYCEMIA, WITHOUT LONG-TERM CURRENT USE OF INSULIN (H): ICD-10-CM

## 2020-03-31 ENCOUNTER — ANCILLARY PROCEDURE (OUTPATIENT)
Dept: GENERAL RADIOLOGY | Facility: CLINIC | Age: 60
End: 2020-03-31
Attending: PODIATRIST
Payer: COMMERCIAL

## 2020-03-31 ENCOUNTER — OFFICE VISIT (OUTPATIENT)
Dept: PODIATRY | Facility: CLINIC | Age: 60
End: 2020-03-31
Payer: COMMERCIAL

## 2020-03-31 VITALS — HEART RATE: 100 BPM | SYSTOLIC BLOOD PRESSURE: 168 MMHG | DIASTOLIC BLOOD PRESSURE: 88 MMHG

## 2020-03-31 DIAGNOSIS — M79.671 PAIN IN RIGHT FOOT: ICD-10-CM

## 2020-03-31 DIAGNOSIS — M79.671 PAIN IN RIGHT FOOT: Primary | ICD-10-CM

## 2020-03-31 DIAGNOSIS — E11.9 TYPE 2 DIABETES MELLITUS WITHOUT COMPLICATION, WITHOUT LONG-TERM CURRENT USE OF INSULIN (H): ICD-10-CM

## 2020-03-31 DIAGNOSIS — M21.6X9 CAVUS FOOT, ACQUIRED: ICD-10-CM

## 2020-03-31 DIAGNOSIS — M77.8 CAPSULITIS OF FOOT, RIGHT: ICD-10-CM

## 2020-03-31 PROCEDURE — 73630 X-RAY EXAM OF FOOT: CPT | Mod: RT

## 2020-03-31 PROCEDURE — 99203 OFFICE O/P NEW LOW 30 MIN: CPT | Performed by: PODIATRIST

## 2020-03-31 NOTE — LETTER
[facility] [place]  [address]  [place] [address]  Phone: [phone]  Fax: [phone]    March 31, 2020        Zachary Chand  [address]  [address]  [place] [address]          To whom it may concern:    RE: Zachary Chand    Patient was seen and treated today at our clinic.  No work for 2 weeks. 4/1/20-4/15/20    Please contact me for questions or concerns.      Sincerely,        Dr. Martell Gaston DGingerP.M FAC FAS/lld

## 2020-03-31 NOTE — PATIENT INSTRUCTIONS
We wish you continued good healing. If you have any questions or concerns, please do not hesitate to contact us at [phone]    Please remember to call and schedule a follow up appointment if one was recommended at your earliest convenience.   PODIATRY CLINIC HOURS  TELEPHONE NUMBER    Dr. Martell Gaston D.P.M [facility]    Clinics:  [facility]    [facility]   Tuesday 1PM-6PM  [place]  Wednesday 7AM-2PM  [facility]  Thursday 10AM-6PM  [place]  Friday 7AM-3PM  [place]  Specialty schedulers:   [phone] to make an appointment with any Specialty Provider.        Urgent Care locations:    [facility] Monday-Friday 5 pm - 9 pm. Saturday-Sunday 9 am -5pm    Monday-Friday 11 am - 9 pm Saturday 9 am - 5 pm     Monday-Sunday 12 noon-8PM [phone]     If you need a medication refill, please contact us you may need lab work and/or a follow up visit prior to your refill (i.e. Antifungal medications).    [employer] (secure e-mail communication and access to your chart) to send a message or to make an appointment.    If MRI needed please call [place] [facility] at [phone]

## 2020-03-31 NOTE — LETTER
[facility] [place]  [address]  [place] [address]  Phone: [phone]  Fax: [phone]    March 31, 2020        Zachary Chand  [address]  [address]  [place] [address]          To whom it may concern:    RE: Zachary Chand    Patient was seen and treated today at our clinic.  No work for 2 weeks. 4/1/20-4/8/20    Please contact me for questions or concerns.      Sincerely,        Dr. Martell Gaston DGingerP.M FAC FAS/lld

## 2020-03-31 NOTE — PROGRESS NOTES
Subjective:    Pt is seen today in consult from Ann Vizcaino with the c/c of painful right foot.  Patient states he has had bilateral foot pain for years.  The right is always been worse.  Over the last month the right foot pain is increased.  The pain is aggravated by activity and relieved by rest.  He points to the lateral tarsometatarsal joint as to where his pain is.  He also has forefoot pain as well.  He states is getting hard for him to walk now.  He has diabetes that is poorly controlled.  The patient has diabetic peripheral neuropathy.  He is a smoker.  He is at a job where he is standing on concrete walking all day and boots.  He states that his old steel toed boots were a couple years old and he recently replaced these which has helped.  He has never had an orthotic in his shoes.  He has a history of right lower extremity edema and varicosities.  This is from a past trauma in his leg.  Patient states he wears compression stockings for this.  He denies any past history of ulcers in his feet.  He denies any erythema or ecchymosis associated with this.  He is noted no increased edema associated with this.    ROS:  A 10-point review of systems was performed and is positive for that noted in the HPI and as seen above.  All other areas are negative.          Allergies   Allergen Reactions     Bee Pollen      Swelling      No Known Drug Allergies        Current Outpatient Medications   Medication Sig Dispense Refill     aspirin 81 MG EC tablet Take 1 tablet (81 mg) by mouth daily (Patient not taking: Reported on 3/15/2019) 90 tablet 3     atorvastatin (LIPITOR) 40 MG tablet Take 1 tablet (40 mg) by mouth daily 90 tablet 1     blood glucose monitoring (NO BRAND SPECIFIED) meter device kit Use to test blood sugar 1 times daily or as directed.    Also refills on strips and lancets, refills for a year 1 kit 11     blood glucose monitoring (NO BRAND SPECIFIED) test strip Use to test blood sugars 2   times daily or  as directed 100 strip 1     glipiZIDE (GLUCOTROL XL) 5 MG 24 hr tablet Take 1 tablet (5 mg) by mouth daily 90 tablet 0     ibuprofen (ADVIL,MOTRIN) 200 MG tablet Take 200 mg by mouth every 4 hours as needed Reported on 4/14/2017       lisinopril (ZESTRIL) 10 MG tablet Take 1 tablet (10 mg) by mouth daily 90 tablet 1     metFORMIN (GLUCOPHAGE) 1000 MG tablet TAKE 1 TABLET BY MOUTH TWICE DAILY WITH FOOD 180 tablet 1       Patient Active Problem List   Diagnosis     Chronic daily headache     Venous stasis ulcers (H)     Type 2 diabetes mellitus with hyperglycemia, without long-term current use of insulin (H)     Hyperlipidemia LDL goal <100     Hypertension goal BP (blood pressure) < 140/90     Venous insufficiency     Bee sting allergy     Contusion of bone     Varicose 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     Tobacco use disorder     Obesity     Type 2 diabetes mellitus without complication, without long-term current use of insulin (H)       Past Medical History:   Diagnosis Date     Arthritis      Chronic daily headache 3/24/2011     ED (erectile dysfunction)      Hyperlipidemia LDL goal <100 8/2/2012     Hypertension goal BP (blood pressure) < 140/90 8/2/2012     Smoker 8/2/2012     Type 2 diabetes, HbA1c goal < 7% (H) 8/2/2012     Venous stasis ulcers (H) 7/26/2012       Past Surgical History:   Procedure Laterality Date     HERNIA REPAIR  1997    bilateral groin     ORTHOPEDIC SURGERY  1977    right shoulder dislocation       Family History   Problem Relation Age of Onset     Alcohol/Drug Father        Social History     Tobacco Use     Smoking status: Current Every Day Smoker     Packs/day: 1.00     Types: Cigarettes     Smokeless tobacco: Never Used   Substance Use Topics     Alcohol use: Yes     Comment: 2 per day         Exam:    Vitals: BP (!) 168/88   Pulse 100   BMI: There is no height or weight on file to calculate BMI.  Height: Data Unavailable    Constitutional/ general:  Pt is in no apparent  distress, appears well-nourished.  Cooperative with history and physical exam.     Psych:  The patient answered questions appropriately.  Normal affect.  Seems to have reasonable expectations, in terms of treatment.     Eyes:  Visual scanning/ tracking without deficit.     Ears:  Response to auditory stimuli is normal.  negative hearing aid devices.  Auricles in proper alignment.     Lymphatic:  Popliteal lymph nodes not enlarged.     Lungs:  Non labored breathing, non labored speech. No cough.  No audible wheezing. Even, quiet breathing.       Vascular:  positive pedal pulses bilaterally for both the DP and PT arteries.  CFT < 3 sec.  positive pedal hair growth.  Patient has right lower extremity varicosities and edema on his foot ankle and leg pain.  Hemosiderin deposits are noted on his right leg.    Neuro:  Alert and oriented x 3. Coordinated gait.  Light touch sensation is intact to the L4, L5, S1 distributions. No obvious deficits.  No evidence of neurological-based weakness, spasticity, or contracture in the lower extremities.  Monofilament intact on all digits.    Derm: Normal texture and turgor.  No erythema, ecchymosis, or cyanosis.      Musculoskeletal:    Lower extremity muscle strength is normal.  Patient is ambulatory without an assistive device or brace.  No gross deformities.   Cavus foot with weightbearing bilateral.  No forefoot or rear foot deformities noted.    Normal ROM all fore foot and rearfoot joints with no pain or crepitus.  No equinus.  Foot exam is unremarkable.  On his right foot there is no pain with palpation of the midtarsal joint or proximal to this.  He has pain on the right styloid process and lateral tarsometatarsal joints.  He has some pain in his forefoot but this is very hard to localize.  No pain with stressing any tendons or palpation of his tendons.  There is no erythema or ecchymosis.  Varicosities and edema are noted on his right foot.     Patient wearing compression  stockings today with very malleable shoes    Radiographic Exam:  X-Ray Findings:  I personally reviewed the films.  X-rays unremarkable in the area of the patient's pain.  We note he has metatarsus adductus.    Hemoglobin A1C  10.2  High    0 - 5.6  %  Final  03/13/2020  2:48 PM  BE          A:   Type 2 diabetes with peripheral neuropathy  Metatarsalgia and neuropathic pain right forefoot  Cavus feet with right lateral tarsal metatarsal joint pain.    Plan:  Reviewed patient's recent labs.  X-ray taken today of right foot.  Explained to patient his right lateral tarsometatarsal joint pain is probably from overuse.  Discussed how his cavus foot and metatarsus adductus cause more pressure here.  Also contributing are wearing work boots that are 2 years old.  To relieve his immediate pain we discussed a Cam walker and he is in agreement.  Will dispense cam walker today.  Patient to wear this at all times while walking.  When not walking patient will take this off and do ROM to prevent blood clot and joint stiffness.  Patient will not sleep with this on.  With the patient walk and it felt much better.  We wrote him a note for seated work only for the next 2 weeks.  We wrote the patient a prescription for orthotics.  We discussed he should wear good shoes outside of work at all times both inside and outside and I made suggestions.  Discussed his forefoot pain could be somewhat from overuse.  Part of this pain could also be from his diabetic neuropathy.  We encouraged good blood sugar control at all times.  We encouraged him to quit smoking.  Discussed smoking and poor sugar control could lead to lower extremity amputation.  Encourage patient to continue to wear support stockings at all times.  RTC PRN.  Thank you for allowing me participate in the care of this patient.        Martell Gaston, JESSICA DPM, FACFAS

## 2020-03-31 NOTE — LETTER
3/31/2020         RE: Zachary Chand  [address]  [place] [address]        Dear Colleague,    Thank you for referring your patient, Zachary Chand, to the [facility]. Please see a copy of my visit note below.    Subjective:    Pt is seen today in consult from Ann Vizcaino with the c/c of painful right foot.  Patient states he has had bilateral foot pain for years.  The right is always been worse.  Over the last month the right foot pain is increased.  The pain is aggravated by activity and relieved by rest.  He points to the lateral tarsometatarsal joint as to where his pain is.  He also has forefoot pain as well.  He states is getting hard for him to walk now.  He has diabetes that is poorly controlled.  The patient has diabetic peripheral neuropathy.  He is a smoker.  He is at a job where he is standing on concrete walking all day and boots.  He states that his old steel toed boots were a couple years old and he recently replaced these which has helped.  He has never had an orthotic in his shoes.  He has a history of right lower extremity edema and varicosities.  This is from a past trauma in his leg.  Patient states he wears compression stockings for this.  He denies any past history of ulcers in his feet.  He denies any erythema or ecchymosis associated with this.  He is noted no increased edema associated with this.    ROS:  A 10-point review of systems was performed and is positive for that noted in the HPI and as seen above.  All other areas are negative.          Allergies   Allergen Reactions     Bee Pollen      Swelling      No Known Drug Allergies        Current Outpatient Medications   Medication Sig Dispense Refill     aspirin 81 MG EC tablet Take 1 tablet (81 mg) by mouth daily (Patient not taking: Reported on 3/15/2019) 90 tablet 3     atorvastatin (LIPITOR) 40 MG tablet Take 1 tablet (40 mg) by mouth daily 90 tablet 1     blood glucose  monitoring (NO BRAND SPECIFIED) meter device kit Use to test blood sugar 1 times daily or as directed.    Also refills on strips and lancets, refills for a year 1 kit 11     blood glucose monitoring (NO BRAND SPECIFIED) test strip Use to test blood sugars 2   times daily or as directed 100 strip 1     glipiZIDE (GLUCOTROL XL) 5 MG 24 hr tablet Take 1 tablet (5 mg) by mouth daily 90 tablet 0     ibuprofen (ADVIL,MOTRIN) 200 MG tablet Take 200 mg by mouth every 4 hours as needed Reported on 4/14/2017       lisinopril (ZESTRIL) 10 MG tablet Take 1 tablet (10 mg) by mouth daily 90 tablet 1     metFORMIN (GLUCOPHAGE) 1000 MG tablet TAKE 1 TABLET BY MOUTH TWICE DAILY WITH FOOD 180 tablet 1       Patient Active Problem List   Diagnosis     Chronic daily headache     Venous stasis ulcers (H)     Type 2 diabetes mellitus with hyperglycemia, without long-term current use of insulin (H)     Hyperlipidemia LDL goal <100     Hypertension goal BP (blood pressure) < 140/90     Venous insufficiency     Bee sting allergy     Contusion of bone     Varicose 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     Tobacco use disorder     Obesity     Type 2 diabetes mellitus without complication, without long-term current use of insulin (H)       Past Medical History:   Diagnosis Date     Arthritis      Chronic daily headache 3/24/2011     ED (erectile dysfunction)      Hyperlipidemia LDL goal <100 8/2/2012     Hypertension goal BP (blood pressure) < 140/90 8/2/2012     Smoker 8/2/2012     Type 2 diabetes, HbA1c goal < 7% (H) 8/2/2012     Venous stasis ulcers (H) 7/26/2012       Past Surgical History:   Procedure Laterality Date     HERNIA REPAIR  1997    bilateral groin     ORTHOPEDIC SURGERY  1977    right shoulder dislocation       Family History   Problem Relation Age of Onset     Alcohol/Drug Father        Social History     Tobacco Use     Smoking status: Current Every Day Smoker     Packs/day: 1.00     Types: Cigarettes     Smokeless  tobacco: Never Used   Substance Use Topics     Alcohol use: Yes     Comment: 2 per day         Exam:    Vitals: BP (!) 168/88   Pulse 100   BMI: There is no height or weight on file to calculate BMI.  Height: Data Unavailable    Constitutional/ general:  Pt is in no apparent distress, appears well-nourished.  Cooperative with history and physical exam.     Psych:  The patient answered questions appropriately.  Normal affect.  Seems to have reasonable expectations, in terms of treatment.     Eyes:  Visual scanning/ tracking without deficit.     Ears:  Response to auditory stimuli is normal.  negative hearing aid devices.  Auricles in proper alignment.     Lymphatic:  Popliteal lymph nodes not enlarged.     Lungs:  Non labored breathing, non labored speech. No cough.  No audible wheezing. Even, quiet breathing.       Vascular:  positive pedal pulses bilaterally for both the DP and PT arteries.  CFT < 3 sec.  positive pedal hair growth.  Patient has right lower extremity varicosities and edema on his foot ankle and leg pain.  Hemosiderin deposits are noted on his right leg.    Neuro:  Alert and oriented x 3. Coordinated gait.  Light touch sensation is intact to the L4, L5, S1 distributions. No obvious deficits.  No evidence of neurological-based weakness, spasticity, or contracture in the lower extremities.  Monofilament intact on all digits.    Derm: Normal texture and turgor.  No erythema, ecchymosis, or cyanosis.      Musculoskeletal:    Lower extremity muscle strength is normal.  Patient is ambulatory without an assistive device or brace.  No gross deformities.   Cavus foot with weightbearing bilateral.  No forefoot or rear foot deformities noted.    Normal ROM all fore foot and rearfoot joints with no pain or crepitus.  No equinus.  Foot exam is unremarkable.  On his right foot there is no pain with palpation of the midtarsal joint or proximal to this.  He has pain on the right styloid process and lateral  tarsometatarsal joints.  He has some pain in his forefoot but this is very hard to localize.  No pain with stressing any tendons or palpation of his tendons.  There is no erythema or ecchymosis.  Varicosities and edema are noted on his right foot.     Patient wearing compression stockings today with very malleable shoes    Radiographic Exam:  X-Ray Findings:  I personally reviewed the films.  X-rays unremarkable in the area of the patient's pain.  We note he has metatarsus adductus.    Hemoglobin A1C  10.2  High    0 - 5.6  %  Final  03/13/2020  2:48 PM  BE          A:   Type 2 diabetes with peripheral neuropathy  Metatarsalgia and neuropathic pain right forefoot  Cavus feet with right lateral tarsal metatarsal joint pain.    Plan:  Reviewed patient's recent labs.  X-ray taken today of right foot.  Explained to patient his right lateral tarsometatarsal joint pain is probably from overuse.  Discussed how his cavus foot and metatarsus adductus cause more pressure here.  Also contributing are wearing work boots that are 2 years old.  To relieve his immediate pain we discussed a Cam walker and he is in agreement.  Will dispense cam walker today.  Patient to wear this at all times while walking.  When not walking patient will take this off and do ROM to prevent blood clot and joint stiffness.  Patient will not sleep with this on.  With the patient walk and it felt much better.  We wrote him a note for seated work only for the next 2 weeks.  We wrote the patient a prescription for orthotics.  We discussed he should wear good shoes outside of work at all times both inside and outside and I made suggestions.  Discussed his forefoot pain could be somewhat from overuse.  Part of this pain could also be from his diabetic neuropathy.  We encouraged good blood sugar control at all times.  We encouraged him to quit smoking.  Discussed smoking and poor sugar control could lead to lower extremity amputation.  Encourage patient to  continue to wear support stockings at all times.  RTC PRN.  Thank you for allowing me participate in the care of this patient.        Martell Gaston DPM DPM, FACFAS        Again, thank you for allowing me to participate in the care of your patient.        Sincerely,        Martell Gaston DPM

## 2020-03-31 NOTE — NURSING NOTE
Weight management plan: Patient was referred to their PCP to discuss a diet and exercise plan.       SMOKING CESSATION  What's in cigarette smoke? - Cigarette smoke contains over 4,000 chemicals. Nicotine is one of the main ingredients which is an insecticide/herbicide. It is poisonous to our nervous system, increases blood clotting risk, and decreases the body's defenses to fight off infection. Another chemical is Carbon Monoxide is an asphyxiating gas that permanently binds to blood cells and blocks the transport of oxygen. This leads to tissue death and decreases your metabolism. Tar is a chemical that coats your lungs and trachea which impairs new oxygen coming in and carbon dioxide getting out of your body.   How does smoking impact surgery? - Smoking is particularly hazardous with regards to surgery. Surgery puts stress on the body and a smoker's body is already under strain from these chemicals. Putting the two together, especially for an elective surgery, could be a recipe for disaster. Smoking before and after surgery increases your risk of heart problems, slow wound healing, delayed bone healing, blood clots, wound infection and anesthesia complications.   What are the benefits of quitting? - In 20 minutes your blood pressure will drop back down to normal. In 8 hours the carbon monoxide (a toxic gas) levels in your blood stream will drop by half, and oxygen levels will return to normal. In 48 hours your chance of having a heart attack will have decreased. All nicotine will have left your body. Your sense of taste and smell will return to a normal level. In 72 hours your bronchial tubes will relax, and your energy levels will increase. In 2 weeks your circulation will increase, and it will continue to improve for the next 10 weeks.    Recommendations for elective surgery - Ideally, patients should quit smoking 8 weeks before and at least 2 weeks after elective surgery in order to avoid complications. Simply  cutting back on the amount of cigarettes smoked per day does not offer any benefit or decrease the risk of poor wound healing, heart problems, and infection. Smokers should also start taking Vitamin C and B for two weeks before surgery and two weeks after surgery.    Ways to Stop Smokin. Nicotine patches, lozenges, or gum  2. Support Groups  3. Medications (see below)    List of Medications:  1. Varenicline Tartrate (CHANTIX)   2. Bupropion HCL (WELLBUTRIN, ZYBAN) - note: make sure Wellbutrin is for smoking cessation and not other issues   3. Nicotine Patch (NICODERM)   4. Nicotine Inhaler (NICOTROL)   5. Nicotine Gum Nicotine Polacrilex   6. Nicotine Lozenge: Nicotine Polacrilex (COMMIT)   * [place] offers a smoking support group as well!  Please visit: [email or web address]  If you are interested in these, ask about getting a prescription or talk to your primary care doctor about what may be the best way for you to quit.     Zachary to follow up with Primary Care provider regarding elevated blood pressure.

## 2020-04-14 ENCOUNTER — VIRTUAL VISIT (OUTPATIENT)
Dept: FAMILY MEDICINE | Facility: CLINIC | Age: 60
End: 2020-04-14
Payer: COMMERCIAL

## 2020-04-14 DIAGNOSIS — M79.673 PAIN OF FOOT, UNSPECIFIED LATERALITY: Primary | ICD-10-CM

## 2020-04-14 PROCEDURE — 99207 ZZC NO BILLABLE SERVICE THIS VISIT: CPT | Mod: TEL | Performed by: NURSE PRACTITIONER

## 2020-04-14 NOTE — PROGRESS NOTES
"Zachary Chand is a 59 year old male who is being evaluated via a billable telephone visit.      The patient has been notified of following:     \"This telephone visit will be conducted via a call between you and your physician/provider. We have found that certain health care needs can be provided without the need for a physical exam.  This service lets us provide the care you need with a short phone conversation.  If a prescription is necessary we can send it directly to your pharmacy.  If lab work is needed we can place an order for that and you can then stop by our lab to have the test done at a later time.    Telephone visits are billed at different rates depending on your insurance coverage. During this emergency period, for some insurers they may be billed the same as an in-person visit.  Please reach out to your insurance provider with any questions.    If during the course of the call the physician/provider feels a telephone visit is not appropriate, you will not be charged for this service.\"    Patient has given verbal consent for Telephone visit?  Yes    How would you like to obtain your AVS? Mail a copy    Subjective     Zachary Chand is a 59 year old male who presents to clinic today for the following health issues:    Concern - Follow up in regards to Foot pain  Onset: 3.13.2020 - follow up with foot dr 3/31/2020    Description:   Pt still having pain and not sure what the next step is    Intensity: moderate, severe    Progression of Symptoms:  Same    During discussion, pt very concerned about losing leg.  He wishes to be seen in clinic.  Pt will be called to schedule an in person evaluation in clinic at [facility].       "

## 2020-04-15 ENCOUNTER — OFFICE VISIT (OUTPATIENT)
Dept: FAMILY MEDICINE | Facility: CLINIC | Age: 60
End: 2020-04-15
Payer: COMMERCIAL

## 2020-04-15 VITALS
HEART RATE: 108 BPM | OXYGEN SATURATION: 96 % | WEIGHT: 234.4 LBS | RESPIRATION RATE: 16 BRPM | SYSTOLIC BLOOD PRESSURE: 134 MMHG | TEMPERATURE: 96.5 F | BODY MASS INDEX: 31.79 KG/M2 | DIASTOLIC BLOOD PRESSURE: 62 MMHG

## 2020-04-15 DIAGNOSIS — Z98.890 S/P LUMBAR LAMINECTOMY: ICD-10-CM

## 2020-04-15 DIAGNOSIS — M47.27 OSTEOARTHRITIS OF SPINE WITH RADICULOPATHY, LUMBOSACRAL REGION: Primary | ICD-10-CM

## 2020-04-15 DIAGNOSIS — E11.65 UNCONTROLLED TYPE 2 DIABETES MELLITUS WITH HYPERGLYCEMIA (H): ICD-10-CM

## 2020-04-15 DIAGNOSIS — I83.893 VARICOSE VEINS OF BILATERAL LOWER EXTREMITIES WITH OTHER COMPLICATIONS: ICD-10-CM

## 2020-04-15 PROCEDURE — 99214 OFFICE O/P EST MOD 30 MIN: CPT | Performed by: FAMILY MEDICINE

## 2020-04-15 RX ORDER — GABAPENTIN 300 MG/1
300 CAPSULE ORAL 3 TIMES DAILY
Qty: 90 CAPSULE | Refills: 1 | Status: SHIPPED | OUTPATIENT
Start: 2020-04-15 | End: 2020-04-30

## 2020-04-15 ASSESSMENT — PAIN SCALES - GENERAL: PAINLEVEL: MODERATE PAIN (5)

## 2020-04-15 NOTE — LETTER
April 15, 2020      Zachary Chand  [address] [address]        To Whom It May Concern:    Zachary Chand  was seen in the clinic today for follow up.   He is still undergoing further evaluation for his leg pain.   Please continue to excuse him from work for the next couple of weeks.    He will follow up with his Primary Care provider in the next couple of weeks to re-evaluate.      Please contact me if you have any additional questions or concerns.      Sincerely,        Emilia Coffey MD

## 2020-04-15 NOTE — PROGRESS NOTES
Subjective     Zachary Chand is a 59 year old male who presents to clinic today for the following health issues:    HPI   Musculoskeletal problem/pain:     Persistent lower extremity pain.       Duration: Has been ongoing for the past 3 months, getting worse as time goes on    Description  Location: Right leg, from the knee down    Intensity:  severe    Accompanying signs and symptoms: numbness, tingling and patient did have an infection in the leg and was seen 3/13/2020, was prescribed medication for the infection, also seen analisa on 3/3/2020 in podiatry: is being treated for Capsulitis and cavus of the right foot and high instep.     History  Previous similar problem: YES- prior history of chronic low back pain s/p Lumbar L3-4 laminectomy in 11/2017- see Epic for details.   Previous evaluation:  x-ray    Precipitating or alleviating factors:  Trauma or overuse: No   Aggravating factors include: standing, walking and climbing stairs    Therapies tried and outcome: rest/inactivity- didn't help resolve anything    PROBLEMS TO ADD ON...  Has an underlying history of uncontrolled type 2 diabetes admits that he has only been taking Metformin 1000 mg BID and has not roberto taking the Glipizide 5 mg/day as prescribed.   Recent A1C    Lab Results   Component Value Date    A1C 10.2 03/13/2020    A1C 7.0 03/15/2019    A1C 9.6 11/12/2018    A1C 7.2 10/31/2017    A1C 7.9 12/30/2016     Also reports concern about lower extremity varicose veins and a history of a venous stasis ulcer.   No known history of PVD/PAD- no recent FIONA Ultrasound to evaluate.     Patient Active Problem List   Diagnosis     Chronic daily headache     Venous stasis ulcers (H)     Type 2 diabetes mellitus with hyperglycemia, without long-term current use of insulin (H)     Hyperlipidemia LDL goal <100     Hypertension goal BP (blood pressure) < 140/90     Venous insufficiency     Bee sting allergy     Contusion of bone     Varicose veins of both lower  extremities with pain     Tobacco use disorder     Obesity     Type 2 diabetes mellitus without complication, without long-term current use of insulin (H)     Past Surgical History:   Procedure Laterality Date     HERNIA REPAIR  1997    bilateral groin     ORTHOPEDIC SURGERY  1977    right shoulder dislocation       Social History     Tobacco Use     Smoking status: Current Every Day Smoker     Packs/day: 1.00     Types: Cigarettes     Smokeless tobacco: Never Used   Substance Use Topics     Alcohol use: Yes     Comment: 2 per day     Family History   Problem Relation Age of Onset     Alcohol/Drug Father              Reviewed and updated as needed this visit by Provider         Review of Systems   ROS COMP: Constitutional, HEENT, cardiovascular, pulmonary, gi and gu systems are negative, except as otherwise noted.      Objective    /62   Pulse 108   Temp 96.5  F (35.8  C) (Tympanic)   Resp 16   Wt 106.3 kg (234 lb 6.4 oz)   SpO2 96%   BMI 31.79 kg/m    Body mass index is 31.79 kg/m .  Physical Exam   GENERAL: healthy, alert and no distress  RESP: lungs clear to auscultation - no rales, rhonchi or wheezes  CV: regular rate and rhythm, normal S1 S2, no S3 or S4, no murmur, click or rub, no peripheral edema and peripheral pulses strong  ABDOMEN: soft, nontender, no hepatosplenomegaly, no masses and bowel sounds normal  Comprehensive back pain exam:  No tenderness, Pain limits the following motions: walking for an extended period of time., Lower extremity strength functional and equal on both sides, Diminished paterllar reflex on  right side relative to contralateral side; possible L4 spinal nerve root involvement, Lower extremity sensation normal and equal on both sides and Straight leg raise negative bilaterally  Lower extremities: Multiple spider vein varicosities on the right lower extremity with venous stasis dermatis changes but no skin ulcer at this time.     Diagnostic Test Results:  Recent Labs  reviewed in Epic  Component      Latest Ref Rng & Units 3/13/2020   Sodium      133 - 144 mmol/L 135   Potassium      3.4 - 5.3 mmol/L 3.9   Chloride      94 - 109 mmol/L 102   Carbon Dioxide      20 - 32 mmol/L 25   Anion Gap      3 - 14 mmol/L 8   Glucose      70 - 99 mg/dL 297 (H)   Urea Nitrogen      7 - 30 mg/dL 15   Creatinine      0.66 - 1.25 mg/dL 0.81   GFR Estimate      >60 mL/min/1.73:m2 >90   GFR Estimate If Black      >60 mL/min/1.73:m2 >90   Calcium      8.5 - 10.1 mg/dL 9.1   Creatinine Urine      mg/dL 139   Albumin Urine mg/L      mg/L 14   Albumin Urine mg/g Cr      0 - 17 mg/g Cr 10.22   Hemoglobin A1C      0 - 5.6 % 10.2 (H)           Assessment & Plan     Zachary was seen today for musculoskeletal problem.    Diagnoses and all orders for this visit:    Osteoarthritis of spine with radiculopathy, lumbosacral region S/P lumbar laminectomy; worsening  -     MR Lumbar Spine w Contrast; Future  -    Start:  gabapentin (NEURONTIN) 300 MG capsule; Take 1 capsule (300 mg) by mouth 3 times daily Start at 300 mg PO every day x 1 day, then 300 mg PO BID x 1 day then 300 mg PO TID    Uncontrolled type 2 diabetes mellitus with hyperglycemia (H). Recent A1C 10.2  Strongly encouraged patient to be compliant with his medications and take them as prescribed to improve glycemic control.  Offered Diabetic Educator referral. Patient declined at this time.     Varicose veins of bilateral lower extremities with other complications  -     US FIONA Doppler No Exercise 1-2 Levels Bilateral; Future    Work note given.     Return in about 2 weeks (around 4/29/2020), or if symptoms worsen or fail to improve, for Face to Face Visit.    Emilia Coffey MD  [facility]

## 2020-04-15 NOTE — PATIENT INSTRUCTIONS
Please schedule the Venous Duplex Ultrasound that was recently  ordered by your PCP, Ann Vizcaino NP.     Also recommend that we obtain a Lumbar Spine MRI and an FIONA US to further evaluate; please schedule these at your earliest convenience.       Patient Education     Understanding Lumbar Radiculopathy    Lumbar radiculopathy is irritation or inflammation of a nerve root in the low back. It causes symptoms that spread out from the back down one or both legs. To understand this condition, it helps to understand the parts of the spine:    Vertebrae. These are bones that stack to form the spine. The lumbar spine contains the 5 bottom vertebrae.    Disks. These are soft pads of tissue between the vertebrae. They act as shock absorbers for the spine.    Spinal canal. This is a tunnel formed within the stacked vertebrae. In the lumbar spine, nerves run through this canal.    Nerves. These branch off and leave the spinal canal, traveling out to parts of the body. As they leave the spinal canal, nerves pass through openings between the vertebrae. The nerve root is the part of the nerve that is closest to the spinal canal.    Sciatic nerve. This is a large nerve formed from several nerve roots in the low back. This nerve extends down the back of the leg to the foot.  With lumbar radiculopathy, nerve roots in the low back become irritated. This leads to pain and symptoms. The sciatic nerve is commonly involved, so the condition is often called sciatica.  What causes lumbar radiculopathy?  Aging, injury, poor posture, extra body weight, and other issues can lead to problems in the low back. These problems may then irritate nerve roots. They include:    Damage to a disk in the lumbar spine. The damaged disk may then press on nearby nerve roots.    Degeneration from wear and tear, and aging. This can lead to narrowing (stenosis) of the openings between the vertebrae. The narrowed openings press on nerve roots as they leave  the spinal canal.    Unstable spine. This is when a vertebra slips forward. It can then press on a nerve root.  Other, less common things can put pressure on nerves in the low back. These include diabetes, infection, or a tumor.  Symptoms of lumbar radiculopathy  These include:    Pain in the low back    Pain, numbness, tingling, or weakness that travels into the buttocks, hip, groin, or leg    Muscle spasms  Treatment for lumbar radiculopathy  In most cases, your healthcare provider will first try treatments that help relieve symptoms. These may include:    Prescription and over-the-counter pain medicines. These help relieve pain, swelling, and irritation.    Limits on positions and activities that increase pain. But lying in bed or avoiding all movement is only recommended for a short period of time.    Physical therapy, including exercises and stretches. This helps decrease pain and increase movement and function.    Steroid shots into the lower back. This may help relieve symptoms for a time.    Weight-loss program. If you are overweight, losing extra pounds may help relieve symptoms.  In some cases, you may need surgery to fix the underlying problem. This depends on the cause, the symptoms, and how long the pain has lasted.  Possible complications  Over time, an irritated and inflamed nerve may become damaged. This may lead to long-lasting (permanent) numbness or weakness in your legs and feet. If symptoms change suddenly or get worse, be sure to let your healthcare provider know.  When to call your healthcare provider  Call your healthcare provider right away if you have any of these:    New pain or pain that gets worse    New or increasing weakness, tingling, or numbness in your leg or foot    Problems controlling your bladder or bowel   Date Last Reviewed: 3/10/2016    7827-7480 [employer] [address] [place] [address], [place] [address]. All rights reserved. This information is not intended as a  substitute for professional medical care. Always follow your healthcare professional's instructions.

## 2020-04-16 NOTE — PROGRESS NOTES
Spoke with patient informed him as below. He has paperwork that needs to be completed. Patient is very frustrated because he is getting the run around from the providers he has seen and no one will not fill out his paperwork.  Patient will fax paperwork to clinic for Ann to review. Phone number given to patient for image scheduling to set up scans

## 2020-04-17 ENCOUNTER — TELEPHONE (OUTPATIENT)
Dept: FAMILY MEDICINE | Facility: CLINIC | Age: 60
End: 2020-04-17

## 2020-04-17 NOTE — LETTER
[facility] [place]  [address]  [place] [address]  [phone]        April 23, 2020      Zachary Chand  [address]  [place] [address]      Dear Zachary,    As your health care provider, I am concerned that your age and/or underlying medical condition puts you at particularly high risk for serious complications should you contract a COVID-19 infection.     Specifically, you have the following conditions/diagnoses, included as high risk conditions per the Centers for Disease Control and Prevention at CDC.gov.     Type 2 Diabetes.    COVID-19 is a new disease and there is limited information regarding risk factors for severe disease. Based on currently available information and clinical expertise, older adults and people of any age who have serious underlying medical conditions might be at higher risk for severe illness from COVID-19.     It is my medical opinion that you should avoid close contact with others and work from home if possible during this COVID-19 pandemic.     Please contact me if you have any additional questions or concerns.        Emilia Coffey MD  [facility]

## 2020-04-17 NOTE — TELEPHONE ENCOUNTER
Patient seen by Emilia Coffey M.D. on 4/15/2020. Forms completed to the best of my ability and given to Emilia Coffey M.D.  for review and completion. Laxmi Blood MA

## 2020-04-17 NOTE — LETTER
[facility] [place]  [address]  [place] [address]  [phone]        April 23, 2020      Zachary Chand  [address]  [place] [address]      Dear Zachary,    As your health care provider, I am concerned that your age and/or underlying medical condition puts you at particularly high risk for serious complications should you contract a COVID-19 infection.     Specifically, you have the following conditions/diagnoses, included as high risk conditions per the Centers for Disease Control and Prevention at CDC.gov.     Type 2 Diabetes.     COVID-19 is a new disease and there is limited information regarding risk factors for severe disease. Based on currently available information and clinical expertise, older adults and people of any age who have serious underlying medical conditions might be at higher risk for severe illness from COVID-19.     It is my medical opinion that you should avoid close contact with others and work from home if possible during this COVID-19 pandemic.       Emilia Coffey MD      [facility]

## 2020-04-20 ENCOUNTER — TELEPHONE (OUTPATIENT)
Dept: FAMILY MEDICINE | Facility: CLINIC | Age: 60
End: 2020-04-20

## 2020-04-20 NOTE — TELEPHONE ENCOUNTER
FYI ONLY:     [facility] tech (Jackie) called needing more info on US FIONA orded.  Patient has appt 8AM tomorrow for US.    Spoke with Dr. Coffey- clarified FIONA US orderd due to lower extremity pain and wanting to rule out PAD.  Would like to start with FIONA US- will procede with US of veins if necessary after knowing FIONA results.     Notified Jackie (Ultrasound tech) of the above conversation with Dr. Coffey.    Indication for FIONA US has been updated.     Dr. Coffey: please contact Jackie or Julianna for further information if needed.  [phone].

## 2020-04-21 ENCOUNTER — ANCILLARY PROCEDURE (OUTPATIENT)
Dept: ULTRASOUND IMAGING | Facility: CLINIC | Age: 60
End: 2020-04-21
Attending: FAMILY MEDICINE
Payer: COMMERCIAL

## 2020-04-21 ENCOUNTER — ANCILLARY PROCEDURE (OUTPATIENT)
Dept: MRI IMAGING | Facility: CLINIC | Age: 60
End: 2020-04-21
Attending: FAMILY MEDICINE
Payer: COMMERCIAL

## 2020-04-21 DIAGNOSIS — M79.605 PAIN IN BOTH LOWER EXTREMITIES: ICD-10-CM

## 2020-04-21 DIAGNOSIS — M47.27 OSTEOARTHRITIS OF SPINE WITH RADICULOPATHY, LUMBOSACRAL REGION: ICD-10-CM

## 2020-04-21 DIAGNOSIS — Z98.890 S/P LUMBAR LAMINECTOMY: ICD-10-CM

## 2020-04-21 DIAGNOSIS — M79.604 PAIN IN BOTH LOWER EXTREMITIES: ICD-10-CM

## 2020-04-21 LAB
CREAT BLD-MCNC: 0.8 MG/DL (ref 0.66–1.25)
GFR SERPL CREATININE-BSD FRML MDRD: >90 ML/MIN/{1.73_M2}

## 2020-04-21 PROCEDURE — A9585 GADOBUTROL INJECTION: HCPCS | Performed by: RADIOLOGY

## 2020-04-21 PROCEDURE — 93922 UPR/L XTREMITY ART 2 LEVELS: CPT | Performed by: RADIOLOGY

## 2020-04-21 PROCEDURE — 72158 MRI LUMBAR SPINE W/O & W/DYE: CPT | Performed by: RADIOLOGY

## 2020-04-21 RX ORDER — GADOBUTROL 604.72 MG/ML
10 INJECTION INTRAVENOUS ONCE
Status: COMPLETED | OUTPATIENT
Start: 2020-04-21 | End: 2020-04-21

## 2020-04-21 RX ADMIN — GADOBUTROL 10 ML: 604.72 INJECTION INTRAVENOUS at 09:01

## 2020-04-22 NOTE — TELEPHONE ENCOUNTER
Patient calling on status of forms and wants them faxed to his work when done.  He would also like the results of his ultrasound and MRI.  Please call.d    Thank you.

## 2020-04-23 ENCOUNTER — TELEPHONE (OUTPATIENT)
Dept: OTHER | Facility: CLINIC | Age: 60
End: 2020-04-23

## 2020-04-23 DIAGNOSIS — M47.26 OSTEOARTHRITIS OF SPINE WITH RADICULOPATHY, LUMBAR REGION: Primary | ICD-10-CM

## 2020-04-23 NOTE — TELEPHONE ENCOUNTER
Results discussed with patient.   Forms completed in Ann's absence.   Also attached COVID risk factor verification for employer.

## 2020-04-23 NOTE — TELEPHONE ENCOUNTER
Pt referred to [facility] by Emilia Coffey MD  for Osteoarthritis of spine with radiculopathy, lumbar region.    Patient has MR Lumbar spine and US FIONA  in [facility] on 4/21/20.     I called patient verifying that he does want vascular consult in addition.    Pt needs to be scheduled for consult with vascular medicine.  Will route to scheduling to coordinate an appointment E-visit in the next week or so with vascular medicine.     Lindsey HE, RN    [facility]  Office: [phone]  Fax: [phone]

## 2020-04-23 NOTE — TELEPHONE ENCOUNTER
FMLA forms faxed to [phone]. Patient informed. He would like copy mailed to him as well.   Sent to scanning.

## 2020-04-24 NOTE — TELEPHONE ENCOUNTER
Patient is scheduled for a Telephone Consult with Dr Fry on 04/30/20. Patient was offered Video Visit and declined.

## 2020-04-30 ENCOUNTER — VIRTUAL VISIT (OUTPATIENT)
Dept: OTHER | Facility: CLINIC | Age: 60
End: 2020-04-30
Attending: FAMILY MEDICINE
Payer: COMMERCIAL

## 2020-04-30 VITALS — WEIGHT: 230 LBS | HEIGHT: 73 IN | BODY MASS INDEX: 30.48 KG/M2

## 2020-04-30 DIAGNOSIS — E78.5 HYPERLIPIDEMIA LDL GOAL <70: ICD-10-CM

## 2020-04-30 DIAGNOSIS — I73.9 PAD (PERIPHERAL ARTERY DISEASE) (H): Primary | ICD-10-CM

## 2020-04-30 DIAGNOSIS — E11.65 TYPE 2 DIABETES MELLITUS WITH HYPERGLYCEMIA, WITHOUT LONG-TERM CURRENT USE OF INSULIN (H): ICD-10-CM

## 2020-04-30 DIAGNOSIS — M47.26 OSTEOARTHRITIS OF SPINE WITH RADICULOPATHY, LUMBAR REGION: ICD-10-CM

## 2020-04-30 DIAGNOSIS — I10 BENIGN ESSENTIAL HYPERTENSION: ICD-10-CM

## 2020-04-30 DIAGNOSIS — F10.10 ETOH ABUSE: ICD-10-CM

## 2020-04-30 DIAGNOSIS — F17.218 CIGARETTE NICOTINE DEPENDENCE WITH OTHER NICOTINE-INDUCED DISORDER: ICD-10-CM

## 2020-04-30 PROCEDURE — 99407 BEHAV CHNG SMOKING > 10 MIN: CPT | Mod: 95 | Performed by: INTERNAL MEDICINE

## 2020-04-30 PROCEDURE — 99204 OFFICE O/P NEW MOD 45 MIN: CPT | Mod: 95 | Performed by: INTERNAL MEDICINE

## 2020-04-30 RX ORDER — NICOTINE 21 MG/24HR
1 PATCH, TRANSDERMAL 24 HOURS TRANSDERMAL EVERY 24 HOURS
Qty: 30 PATCH | Refills: 1 | Status: SHIPPED | OUTPATIENT
Start: 2020-04-30 | End: 2022-03-10

## 2020-04-30 RX ORDER — ROSUVASTATIN CALCIUM 10 MG/1
10 TABLET, COATED ORAL DAILY
Qty: 30 TABLET | Refills: 3 | Status: SHIPPED | OUTPATIENT
Start: 2020-04-30 | End: 2020-07-07

## 2020-04-30 ASSESSMENT — MIFFLIN-ST. JEOR: SCORE: 1917.15

## 2020-04-30 NOTE — PROGRESS NOTES
"Zachary Chand is a 59 year old male who is being evaluated via a billable telephone visit.      The patient has been notified of following:     \"This telephone visit will be conducted via a call between you and your physician/provider. We have found that certain health care needs can be provided without the need for a physical exam.  This service lets us provide the care you need with a short phone conversation.  If a prescription is necessary we can send it directly to your pharmacy.  If lab work is needed we can place an order for that and you can then stop by our lab to have the test done at a later time.    Telephone visits are billed at different rates depending on your insurance coverage. During this emergency period, for some insurers they may be billed the same as an in-person visit.  Please reach out to your insurance provider with any questions.    If during the course of the call the physician/provider feels a telephone visit is not appropriate, you will not be charged for this service.\"    Patient has given verbal consent for Telephone visit?  Yes    How would you like to obtain your AVS? Mail a copy    Lia Luna MA        Physician visit/consult note: ( New Visit/ Consult)       Chief complaint: (New patient visit)     Ongoing bilateral leg pain, foot pain, numbness for more than a year right more than left side and previous history of lumbar laminectomy    Recently underwent resting FIONA and also MRI of the lumbosacral spine (see details below)    Still some low back pain    Diabetes poorly controlled    Smoker since age 31 smokes pack a day    Drinks heavy alcohol approximately 4 beers daily        History of present illness:    This is a 59-year-old male with longstanding history of type 2 diabetes mellitus poorly controlled recent A1c 10.2, smoker smoking more than 1 pack cigarettes daily since age 31, hypertension poorly controlled, hyperlipidemia currently not taking any statin experiencing " for the last 1 year right more than left side leg pain tingling numbness and also intermittent discoloration of left toe area.  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  He denies any rest pain, currently no foot ulcers or leg ulcers.  He was prescribed gabapentin and currently not taking and it caused some side effects.  He was also prescribed atorvastatin currently not taking.    He just started few days ago Glucotrol XL 5 mg daily but taking thousand milligrams twice a day of metformin  He is also taking lisinopril 10 mg daily    He denies any chest pain, shortness of breath or palpitations    Reviewed laboratory data, imaging studies in the epic, and updated chart     Review of systems: All 12 point review of systems discussed as per HPI.    Physical exam: (Not done this is a virtual visit)    Imaging studies:  MR LUMBAR SPINE W/O & W CONTRAST 4/21/2020 9:13 AM     Provided History: Worsening symptoms of radiculopathy.      Comparison: Lumbar MRI dated 8/24/2017     Technique: Sagittal T1-weighted, sagittal STIR, 3D volumetric axial  and sagittal reconstructed T2-weighted images of the lumbar spine were  obtained without intravenous contrast.      Findings: There are 5 lumbar-type vertebrae assumed for the purposes  of this dictation.  The tip of the conus medullaris is at L1.  Normal  lumbar vertebral alignment.  There is intradiscal T2 signal loss and  disc height narrowing at L3-4, L4-5 and L5-S1.  Normal marrow signal.     On a level by level basis:     T12-L1: No spinal canal or neuroforaminal stenosis.     L1-2: No spinal canal or neuroforaminal stenosis.     L2-3: No spinal canal or neuroforaminal stenosis.     L3-4: Laminectomy per chart, although operational note is not seen.  Enhancing right neuroforaminal scarring/disc osteophyte complex.  Improved and residual mild to moderate spinal canal stenosis. Mild  bilateral neuroforaminal stenosis.     L4-5:  Postoperative changes of bilateral laminectomy. Disc osteophyte  complex. No significant spinal canal stenosis. Mild bilateral  neuroforaminal stenosis.     L5-S1: Disc osteophyte complex. Asymmetric left  subarticular/neuroforaminal disc protrusion abutting left thecal sac.  No significant spinal canal stenosis. Mild right moderate to severe  left neuroforaminal stenosis. Probable left L5 exiting nerve root  impingement. Slightly worsening than 8/24/2017.     Paraspinous tissues are within normal limits.                                                                      Impression:  1. L3-4 enhancing right neuroforaminal scarring/disc osteophyte  complex. Improved and residual mild to moderate spinal canal stenosis.  Mild bilateral neuroforaminal stenosis.  2. Postoperative changes of bilateral laminectomy at L4-5. No  significant spinal canal stenosis. Mild bilateral neuroforaminal  stenosis.  3. L5-S1 with asymmetric left subarticular/neuroforaminal disc  protrusion abutting the left S1 nerve root within the subarticular  zone with moderate neuroforaminal stenosis as well, this is worsened  from 8/24/2017.      I have personally reviewed the examination and initial interpretation  and I agree with the findings.     MARIELENA PETERSEN MD    Exam: Bilateral lower extremity resting ankle brachial indices dated  4/21/2020 8:20 AM     Comparison study: None     Clinical history: Pain in both lower extremities; Pain in both lower  extremities     Ordering provider: AMISHA GILLILAND     Technique: Bilateral lower extremity resting ankle brachial indices  obtained.     Findings:     Right:       Arm: 156 mmHg   PT at ankle: 130 mmHg   DP at foot: 96 mmHg      FIONA: 0.8     Left:      Arm: 150 mmHg   PT at ankle: 130 mmHg   DP at foot: 120 mmHg      FIONA: 0.8                                                                         Impression:      Right leg: Resting FIONA is abnormal.     Left leg: Resting FIONA is  abnormal.           Guidelines:     FIONA Diagnostic Criteria (Based on criteria published in Circulation  2011; 124: 5589-7048):    > 1.4: Non compressible    1.00 - 1.40: Normal    0.91 - 0.99: Borderline    At or below 0.90: Abnormal     FIONA Diagnostic Criteria (Based on ACC/AHA guideline 2008):    >/=1.3 - non compressible vessels    1.00  -1.29 - Normal    0.91 - 0.99 - Borderline    0.41 - 0.90 - Mild to moderate PAD    0.00 - 0.40 - Severe PAD     SHALONDA LENNON MD     Labs:    3/13/2020    A1c : 10.2    LDL 3/2019      Assessment and plan:    1. PAD (peripheral artery disease) (H) (recent resting FIONA 0.80 bilaterally) with claudication appears multifactorial (PAD mild, degenerative disc disease of lumbar spine previous surgery, poorly controlled diabetes with neuropathy, EtOH with possible neuropathy etc.    2. Osteoarthritis of spine with radiculopathy, lumbar region    3. Type 2 diabetes mellitus with hyperglycemia, without long-term current use of insulin and neuropathy (H)      This is a 59-year-old male with poorly controlled multiple risk factors ongoing smoking, poorly controlled diabetes with neuropathy, chronic low back pain with previous disc surgery and worsened recent MRI on the left side, hypertension, hyperlipidemia currently not on any statin and poorly controlled and fluctuating blood pressure experiencing 1 year history of both feet numbness heaviness and discomfort right more than left side.  Describes pain as a shooting type.  No rest pain no foot ulcers or leg ulcers.    I had a lengthy discussion with the patient and urged aggressive modification and control of all his risk factors as delineated below    Plan:  He must quit smoking see below (prescription sent)  Take baby aspirin daily with food  Walk as much as he can  Will arrange FIONA with exercise or TBI when COVID-19 related issues get better in a month or so  Increase lisinopril dose to 20 mg daily  Initiate Crestor 10 mg daily, new  prescription sent  Follow-up with me in 1 to 2 months        4. Cigarette nicotine dependence with other nicotine-induced disorder:    He has been smoking approximately 1 pack daily for the last 28 years and previously patches helped  Will prescribe NicoDerm patch 21 mg daily, if needed we can add Wellbutrin  mg daily for 3 days then followed by twice a day or Chantix (he currently wants patches only)  Discussed benefits of quitting smoking specifically claudication will improve and other CV related risks will improve  10 minutes of smoking cessation counseling done    - nicotine (NICODERM CQ) 21 MG/24HR 24 hr patch; Place 1 patch onto the skin every 24 hours  Dispense: 30 patch; Refill: 1  - SMOKING CESSATION COUNSELING >10 MIN    5. Hyperlipidemia LDL goal <70    He was prescribed atorvastatin 40 mg daily but developed some rib cage pain and stopped it.  Goal of LDL less than 70  Will prescribe low-dose Crestor 10 mg daily and repeat lipid panel in 3 months if tolerates may increase the dose in the future.  TLC suggested  Aggressive modification of her risk factors as delineated above and below    - rosuvastatin (CRESTOR) 10 MG tablet; Take 1 tablet (10 mg) by mouth daily  Dispense: 30 tablet; Refill: 3    6. Benign essential hypertension:    Reviewed chart he has labile hypertension currently taking lisinopril 10 mg daily suggested patient to increase the dose to 20 mg daily and may take existing supply of 2 tablets of 10 mg daily then call for the new prescription if tolerates.  Monitor blood pressure and goal is less than 130/80  Avoid NSAIDs  DASH diet discussed.    7. ETOH abuse:     Discussed with the patient is neuropathy symptoms multifactorial probably contributing poorly controlled diabetes, excess alcohol use with neuropathy and multiple other things.  Strongly suggested him to quit alcohol.      Phone call duration: 60 minutes,( this includes review of all imaging studies recent laboratory data,   updating chart , smoking cessation counseling  Etc He is new to this clinic, )      This note was dictated by utilizing Dragon software    Thank you for the consultation !    Copy of this dictation to primary care provider and referring provider.    Return to clinic in 1 to 2 months    AVS mailed to patient:    Demetrio Fry MD, FAESTRELLA, [facility]  Vascular medicine  Clinical hypertension specialist  Clinical Lipidologist

## 2020-04-30 NOTE — PATIENT INSTRUCTIONS
1.  Quit smoking    2.  Start NicoDerm patch 21 mg daily new prescription sent to pharmacy    3.  Take baby aspirin enteric-coated 81 mg daily with food    4.  Tight control of the blood sugars, consider increasing Glucotrol XL to 10 mg daily and may take 2 tablets of 5 mg tablets until your supplies last    5.  Please go for FIONA with exercise test when corona related issues improves.    6.  Monitor blood pressure and if your blood pressure is greater than 130/80 may increase lisinopril dose to 20 mg daily and may take 2 tablets of 10 mg pills until supplies last    7.  Quit alcohol    8.  Follow-up with neurology or neurosurgery due to recent MRI changes and previous back surgery etc..    9.  Start cholesterol-lowering medication Crestor 10 mg daily, new prescription sent to pharmacy.    10.  Follow-up with me in the clinic in 1 to 2 months  ([facility], [phone] )

## 2020-05-11 ENCOUNTER — OFFICE VISIT (OUTPATIENT)
Dept: NEUROSURGERY | Facility: CLINIC | Age: 60
End: 2020-05-11
Attending: FAMILY MEDICINE
Payer: COMMERCIAL

## 2020-05-11 VITALS
SYSTOLIC BLOOD PRESSURE: 138 MMHG | HEIGHT: 73 IN | BODY MASS INDEX: 30.22 KG/M2 | WEIGHT: 228 LBS | HEART RATE: 98 BPM | DIASTOLIC BLOOD PRESSURE: 78 MMHG

## 2020-05-11 DIAGNOSIS — M47.26 OSTEOARTHRITIS OF SPINE WITH RADICULOPATHY, LUMBAR REGION: ICD-10-CM

## 2020-05-11 PROCEDURE — 99203 OFFICE O/P NEW LOW 30 MIN: CPT | Performed by: NEUROLOGICAL SURGERY

## 2020-05-11 PROCEDURE — G0463 HOSPITAL OUTPT CLINIC VISIT: HCPCS

## 2020-05-11 ASSESSMENT — PAIN SCALES - GENERAL: PAINLEVEL: SEVERE PAIN (6)

## 2020-05-11 ASSESSMENT — MIFFLIN-ST. JEOR: SCORE: 1908.08

## 2020-05-11 NOTE — NURSING NOTE
"Zachary Chand is a 59 year old male who presents for:  Chief Complaint   Patient presents with     Neurologic Problem     Osteoarthritis w/ radiculopathy lumbar        Initial Vitals:  /78   Pulse 98   Ht 6' 1\" (1.854 m)   Wt 228 lb (103.4 kg)   BMI 30.08 kg/m   Estimated body mass index is 30.08 kg/m  as calculated from the following:    Height as of this encounter: 6' 1\" (1.854 m).    Weight as of this encounter: 228 lb (103.4 kg).. Body surface area is 2.31 meters squared. BP completed using cuff size: regular  Severe Pain (6)    Nursing Comments: Patient presents with LBP radiates from both knees down to feet    Riley Parson MA  "

## 2020-05-11 NOTE — LETTER
5/11/2020         RE: Zachary Chand  [address] [address] [facility] [id number]        Dear Colleague,    Thank you for referring your patient, Zachary Chand, to the [facility]. Please see a copy of my visit note below.    Mr. Chand is a 59-year-old man seen today for complaint of bilateral foot pain.  His history is reviewed and is significant for prior surgery with Dr. Chris Castañeda and [facility] [place] neurosurgery several years previously for symptoms suggestive of neurogenic claudication with a single level lumbar laminectomy for decompression.  Surgery was complicated by postoperative infection but ultimately Mr. Martinez's symptoms appear to have resolved related to his legs.  Over the past year he has had aches increasing pain in his feet bilaterally.  He is previously been evaluated for this complaint by a podiatrist with a variety of diagnoses.  On a recent examination it appears that a decreased right patellar tendon reflex was observed and there was concern regarding the possibility of lumbar radiculopathy.  Mr. Martinez denies symptoms consistent with radiculopathy, specifically dermatomal numbness, pain in his buttock or down his legs and notes that he is comfortable while seated, uncomfortable while bearing weight or walking.  He has no complaint of changes in bowel or bladder control.    On examination in the office today, he has a slightly antalgic gait related to lateral foot pain bilaterally with the right side more affected than the left.  However, with encouragement he has 5/5 strength in his bilateral lower extremities including plantar and dorsiflexion at the ankles while weightbearing.  Proximal bilateral lower extremity strength appears to be intact.  There is no evidence of dermatomal sensory disturbance.  Deep tendon reflexes are 1+ and equal at the knees and ankles bilaterally to my exam.  There are no long track findings and no ankle clonus noted.   Straight leg raising is negative for radicular symptoms bilaterally.    Review of his recent lumbar MRI scan shows multilevel degenerative lumbar disc changes with evidence of prior surgery at the L3-4 level consisting of a central and left-sided laminectomy.  Decompression at this level appears to be marginal but effective.  There is no evidence of persistent postoperative complication.  At the L5-S1 level he is noted to have a focal disc protrusion eccentric toward the left side producing lateral recess stenosis.  There is no other source of neurologic impingement noted on the remainder of the study.    Assessment: Intractable foot pain related to primary foot pathology as well as possible superimposed diabetic neuropathy and/or vascular disease.  I find no indication of lumbar radiculopathy nor any indication for surgical intervention in his lumbar spine.  I reviewed the lumbar MRI scan and the clinical situation as well as the differential diagnosis with Mr. Gallo today in the office.  He appeared to a good understanding at the conclusion of our discussion and will follow up with his primary care physicians.  I have encouraged him to call or return to the office for reevaluation should his symptoms change significantly in severity or character particularly should the predominant problem shift to the left leg to include radicular symptoms which we discussed.    Just over 35 minutes was spent in direct contact with the patient the majority reviewing the clinical and radiographic findings, differential diagnosis and management alternatives risks and benefits.    Again, thank you for allowing me to participate in the care of your patient.        Sincerely,        Alexander Hightower MD

## 2020-05-11 NOTE — PROGRESS NOTES
Mr. Chand is a 59-year-old man seen today for complaint of bilateral foot pain.  His history is reviewed and is significant for prior surgery with Dr. Chris Castañeda and [facility] neurosurgery several years previously for symptoms suggestive of neurogenic claudication with a single level lumbar laminectomy for decompression.  Surgery was complicated by postoperative infection but ultimately Mr. Martinez's symptoms appear to have resolved related to his legs.  Over the past year he has had aches increasing pain in his feet bilaterally.  He is previously been evaluated for this complaint by a podiatrist with a variety of diagnoses.  On a recent examination it appears that a decreased right patellar tendon reflex was observed and there was concern regarding the possibility of lumbar radiculopathy.  Mr. Martinez denies symptoms consistent with radiculopathy, specifically dermatomal numbness, pain in his buttock or down his legs and notes that he is comfortable while seated, uncomfortable while bearing weight or walking.  He has no complaint of changes in bowel or bladder control.    On examination in the office today, he has a slightly antalgic gait related to lateral foot pain bilaterally with the right side more affected than the left.  However, with encouragement he has 5/5 strength in his bilateral lower extremities including plantar and dorsiflexion at the ankles while weightbearing.  Proximal bilateral lower extremity strength appears to be intact.  There is no evidence of dermatomal sensory disturbance.  Deep tendon reflexes are 1+ and equal at the knees and ankles bilaterally to my exam.  There are no long track findings and no ankle clonus noted.  Straight leg raising is negative for radicular symptoms bilaterally.    Review of his recent lumbar MRI scan shows multilevel degenerative lumbar disc changes with evidence of prior surgery at the L3-4 level consisting of a central and left-sided laminectomy.   Decompression at this level appears to be marginal but effective.  There is no evidence of persistent postoperative complication.  At the L5-S1 level he is noted to have a focal disc protrusion eccentric toward the left side producing lateral recess stenosis.  There is no other source of neurologic impingement noted on the remainder of the study.    Assessment: Intractable foot pain related to primary foot pathology as well as possible superimposed diabetic neuropathy and/or vascular disease.  I find no indication of lumbar radiculopathy nor any indication for surgical intervention in his lumbar spine.  I reviewed the lumbar MRI scan and the clinical situation as well as the differential diagnosis with Mr. Gallo today in the office.  He appeared to a good understanding at the conclusion of our discussion and will follow up with his primary care physicians.  I have encouraged him to call or return to the office for reevaluation should his symptoms change significantly in severity or character particularly should the predominant problem shift to the left leg to include radicular symptoms which we discussed.    Just over 35 minutes was spent in direct contact with the patient the majority reviewing the clinical and radiographic findings, differential diagnosis and management alternatives risks and benefits.

## 2020-05-13 ENCOUNTER — VIRTUAL VISIT (OUTPATIENT)
Dept: FAMILY MEDICINE | Facility: CLINIC | Age: 60
End: 2020-05-13
Payer: COMMERCIAL

## 2020-05-13 ENCOUNTER — TRANSFERRED RECORDS (OUTPATIENT)
Dept: HEALTH INFORMATION MANAGEMENT | Facility: CLINIC | Age: 60
End: 2020-05-13

## 2020-05-13 DIAGNOSIS — R20.0 NUMBNESS OF TOES: Primary | ICD-10-CM

## 2020-05-13 PROCEDURE — 99213 OFFICE O/P EST LOW 20 MIN: CPT | Mod: 95 | Performed by: PHYSICIAN ASSISTANT

## 2020-05-13 NOTE — PATIENT INSTRUCTIONS
Bubba Sharma,    Thank you for allowing [facility] to manage your care.    You agreed to go to the emergency department at [facility] today to have your legs evaluated.    I ordered an arterial US of your legs that needs to be completed after the pandemic has cooled down, please call diagnostic imaging [phone] to schedule your test.    I made a referral to the diabetes educator to discuss your medications and eating habits, they will be calling in approximately 1 week to set up your appointment.  If you do not hear from them, please call the specialty number on your after visit summary.      Make a follow-up appointment with your primary care provider, Ann Vizcaino for a visit in a month to discuss her symptoms and medication adjustments if needed.    If you have any questions or concerns, please feel free to call us at [phone]    Sincerely,    Gregory Olson PA-C    Did you know?  You can schedule an e-Visit for certain simple non-emergent issue for your convenience.  To learn more about or start an eVisit, simply login to [employer], click  Visits  on top banner, click  Start a Virtual Visit  drop down, and click  Symptom-Specific E-Visit     Patient Education     Peripheral Neuropathy  Peripheral neuropathy is the result of damage to the peripheral nerves. It usually affects the arms or legs, and causes a change in physical feeling. Sometimes it causes weakness in the muscles. You may feel tingling, numbness or shooting pains. Symptoms may be more common at night. Skin may be extra sensitive to light touch or temperature changes.  Neuropathy may be caused by a complication of a chronic disease such as diabetes, virus or bacterial infections, or physical injury. A ruptured disk with pressure on the spinal nerve may also lead to the problem. Certain vitamin deficiencies may also lead to it. It may also be caused by exposure to certain drugs or chemicals.  Home care    Tell the healthcare  provider about all medicines you take. This includes prescription and over-the-counter medicines, vitamins, and herbs. Ask if any of the medicines may be causing your problems. Don't make any changes to prescription medicines without talking to your healthcare provider first.    You may be prescribed medicines to help relieve the tingling feeling or for pain. Take all medicines as directed.    A numb hand or foot may be more prone to injury. To help protect it:  ? Always use oven mitts.  ? Test water with an unaffected hand or foot.  ? Use caution when trimming nails. File sharp areas.  ? Wear shoes that fit well to avoid pressure points, blisters, and ulcers.  ? Inspect your hands and feet carefully (including the soles of your feet and between your toes) at least once a week. If you see red areas, sores, or other problems, tell your healthcare provider.    Follow-up care  Follow up with your doctor or as advised by our staff. You may need further testing or evaluation.  When to seek medical advice  Call your healthcare provider right away if any of the following occur:    Redness, swelling, cracking, or ulcer on any numb area, especially the feet    New symptoms of numbness or muscle weakness numbness    Loss of bowel or bladder control    Slurred speech, confusion, or trouble speaking, walking, or seeing  Date Last Reviewed: 3/1/2018    2092-8614 [employer]. [address] [place] [address]. All rights reserved. This information is not intended as a substitute for professional medical care. Always follow your healthcare professional's instructions.           Patient Education     Leg Artery Emergencies: Acute Arterial Occlusion  Acute arterial occlusion is serious. It occurs when blood flow in a leg artery stops suddenly. If blood flow to your toe, foot, or leg is completely blocked, the tissue begins to die. This is called gangrene. If this happens, medical care is needed right away to  restore blood flow and possibly save your leg.   When do you need emergency care?  Call your healthcare provider or go to the emergency department right away if you have any of the following:    Sudden pain in your leg or foot that may become severe    Pale or blue skin    Skin feels cold to the touch    Problems moving your foot, leg, or toes    No pulse where you used to be able to feel one  Causes of acute arterial occlusion  You are more likely to have this condition if you have peripheral arterial disease (PAD). With PAD, leg arteries are narrowed. This reduces blood flow to your legs and feet.  How is acute arterial occlusion diagnosed?  First, a provider carefully gives you an exam. He or she checks your pulse in several different spots. Then tests are done to check your blood flow. These tests may include:    Ankle-brachial index (FIONA). The blood pressure in your ankle is compared to the blood pressure in your arm.    Duplex ultrasound. Harmless sound waves are used to create images of blood flow in your legs.    Arteriography. X-ray dye (contrast medium) is injected into the artery. This is done using a thin, flexible tube (catheter). The dye makes blood vessels show up more clearly on X-rays.  How is acute arterial occlusion treated?  Possible treatments for acute arterial occlusion include:    Dissolving or removing a blood clot. A tube (catheter) may be put into an artery in the groin to dissolve the clot. Clot-busting medicine is then put into the tube to dissolve the clot.  Or surgery may be done to remove the clot. A cut (incision) is made in the artery at the blocked area. The clot is then removed.    Angioplasty. A tiny, uninflated balloon is passed through a catheter to the narrowed part of the artery. The balloon is inflated to widen the artery. The balloon is then deflated and removed.    Stenting. After angioplasty, a tiny wire mesh tube (stent) may be put in the artery to help hold it open. The  stent is also placed using a catheter.    Endarterectomy. An incision is made in the artery at the blocked area. The material that blocks the artery is then removed from artery walls.    Peripheral bypass surgery. A natural or manmade graft is used to bypass the blocked part of the artery.  How can I protect myself?  Know the signs and symptoms of an acute arterial occlusion. If you have diabetes or poor blood circulation, check your feet daily for wounds, sores, blisters, and color changes. Call your healthcare provider right away if you notice problems. If you smoke, stop smoking.  Date Last Reviewed: 6/1/2016 2000-2019 [email or web address]. [address] [place] [address], [place] [address]. All rights reserved. This information is not intended as a substitute for professional medical care. Always follow your healthcare professional's instructions.

## 2020-05-13 NOTE — PROGRESS NOTES
"Zachary Chand is a 59 year old male who is being evaluated via a billable telephone visit.      The patient has been notified of following:     \"This telephone visit will be conducted via a call between you and your physician/provider. We have found that certain health care needs can be provided without the need for a physical exam.  This service lets us provide the care you need with a short phone conversation.  If a prescription is necessary we can send it directly to your pharmacy.  If lab work is needed we can place an order for that and you can then stop by our lab to have the test done at a later time.    Telephone visits are billed at different rates depending on your insurance coverage. During this emergency period, for some insurers they may be billed the same as an in-person visit.  Please reach out to your insurance provider with any questions.    If during the course of the call the physician/provider feels a telephone visit is not appropriate, you will not be charged for this service.\"    Patient has given verbal consent for Telephone visit?  Yes    What phone number would you like to be contacted at? [phone]    How would you like to obtain your AVS? Patient declined AVS    Subjective     Zachary Chand is a 59 year old male who presents to clinic today for the following health issues:    HPI  Patient w/ chronic BLE pain. Thought to be multifactorial with PAD, DM2 and musculoskeletal causes contributing.     Has known PAD in BLEs with last FIONA US done on 4/21/20 showing moderate PAD. Vascular medicine would like him to get exercise ABIs post-COVID.     Since his last US, he has developed new onset toe discoloration in the left foot, primarily in the left big toe with other toes involved intermittently. Toes are cool to touch. Does not notice this in the rest of his foot.    Recently restarted 5mg of glipizide in the last week.  Previously he had been getting hypoglycemic when taking his metformin " and glipizide at the same time.  He now staggers how he is taking these medications and has not had any hypoglycemic events.  Last hemoglobin A1c was 10.2 in March of this year.    Patient Active Problem List   Diagnosis     Chronic daily headache     Venous stasis ulcers (H)     Type 2 diabetes mellitus with hyperglycemia, without long-term current use of insulin (H)     Hyperlipidemia LDL goal <100     Hypertension goal BP (blood pressure) < 140/90     Venous insufficiency     Bee sting allergy     Contusion of bone     Varicose 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     Tobacco use disorder     Obesity     Type 2 diabetes mellitus without complication, without long-term current use of insulin (H)     PAD (peripheral artery disease) (H)     Past Surgical History:   Procedure Laterality Date     HERNIA REPAIR  1997    bilateral groin     ORTHOPEDIC SURGERY  1977    right shoulder dislocation       Social History     Tobacco Use     Smoking status: Current Every Day Smoker     Packs/day: 1.00     Types: Cigarettes     Smokeless tobacco: Never Used   Substance Use Topics     Alcohol use: Yes     Comment: 2 beers per day     Family History   Problem Relation Age of Onset     Alcohol/Drug Father          Current Outpatient Medications   Medication Sig Dispense Refill     blood glucose monitoring (NO BRAND SPECIFIED) meter device kit Use to test blood sugar 1 times daily or as directed.    Also refills on strips and lancets, refills for a year 1 kit 11     blood glucose monitoring (NO BRAND SPECIFIED) test strip Use to test blood sugars 2   times daily or as directed 100 strip 1     glipiZIDE (GLUCOTROL XL) 5 MG 24 hr tablet Take 1 tablet (5 mg) by mouth daily 90 tablet 0     lisinopril (ZESTRIL) 10 MG tablet Take 1 tablet (10 mg) by mouth daily 90 tablet 1     metFORMIN (GLUCOPHAGE) 1000 MG tablet TAKE 1 TABLET BY MOUTH TWICE DAILY WITH FOOD 180 tablet 1     nicotine (NICODERM CQ) 21 MG/24HR 24 hr patch Place 1  patch onto the skin every 24 hours (Patient not taking: Reported on 5/11/2020) 30 patch 1     rosuvastatin (CRESTOR) 10 MG tablet Take 1 tablet (10 mg) by mouth daily (Patient not taking: Reported on 5/11/2020) 30 tablet 3     Allergies   Allergen Reactions     Bee Pollen      Swelling      No Known Drug Allergies      Novocain [Procaine] Other (See Comments)     Per pt states having panic attacks, breathing.       Reviewed and updated as needed this visit by Provider  Tobacco  Allergies  Meds  Problems  Med Hx  Surg Hx  Fam Hx         Review of Systems   Constitutional, cardiovascular, and pulmonary systems are negative, except as otherwise noted.       Objective   Reported vitals:  There were no vitals taken for this visit.   healthy, alert and no distress  PSYCH: Alert and oriented times 3; coherent speech, normal   rate and volume, able to articulate logical thoughts, able   to abstract reason, no tangential thoughts, no hallucinations   or delusions  His affect is normal and pleasant  RESP: No cough, no audible wheezing, able to talk in full sentences  Remainder of exam unable to be completed due to telephone visits    Diagnostic Test Results:  Labs reviewed in Epic        Assessment/Plan:  1. Numbness of toes  Per his report, he has new cold and numb toes including the great toe of his left foot since his last ultrasound for PAD.  I am  concerned about a limb threatening acute clot. Since I am working virtually today and there are no face-to-face office visits available today nor way to get an arterial ultrasound in a timely manner, I recommended that he go to [facility] emergency department for evaluation today.  May need repeat ultrasound to rule out acute clot versus other issue.  I spoke with nurse Carol who was assisting the charge nurse.  She accepted the patient.  He agrees with this plan and will drive by private vehicle after we complete his phone call.  I feel this is reasonable.  Of note, I did  place a referral for him to see a diabetes educator.  He will also need his hemoglobin A1c repeated at some point.  Follow-up in the next month and primary care clinic.    - AMBULATORY ADULT DIABETES EDUCATOR REFERRAL; Future    Go to emergency department at [facility] immediately.    Phone call duration:  11 minutes    JERMAIN Clancy

## 2020-05-18 ENCOUNTER — VIRTUAL VISIT (OUTPATIENT)
Dept: FAMILY MEDICINE | Facility: CLINIC | Age: 60
End: 2020-05-18
Payer: COMMERCIAL

## 2020-05-18 DIAGNOSIS — I73.9 PAD (PERIPHERAL ARTERY DISEASE) (H): Primary | ICD-10-CM

## 2020-05-18 DIAGNOSIS — E11.65 TYPE 2 DIABETES MELLITUS WITH HYPERGLYCEMIA, WITHOUT LONG-TERM CURRENT USE OF INSULIN (H): ICD-10-CM

## 2020-05-18 DIAGNOSIS — M79.675 GREAT TOE PAIN, LEFT: ICD-10-CM

## 2020-05-18 PROCEDURE — 99214 OFFICE O/P EST MOD 30 MIN: CPT | Mod: 95 | Performed by: FAMILY MEDICINE

## 2020-05-18 RX ORDER — ASPIRIN 81 MG/1
81 TABLET ORAL DAILY
COMMUNITY
End: 2023-09-28

## 2020-05-18 NOTE — PROGRESS NOTES
"Zachary Chand is a 59 year old male who is being evaluated via a billable telephone visit.      The patient has been notified of following:     \"This telephone visit will be conducted via a call between you and your physician/provider. We have found that certain health care needs can be provided without the need for a physical exam.  This service lets us provide the care you need with a short phone conversation.  If a prescription is necessary we can send it directly to your pharmacy.  If lab work is needed we can place an order for that and you can then stop by our lab to have the test done at a later time.    Telephone visits are billed at different rates depending on your insurance coverage. During this emergency period, for some insurers they may be billed the same as an in-person visit.  Please reach out to your insurance provider with any questions.    If during the course of the call the physician/provider feels a telephone visit is not appropriate, you will not be charged for this service.\"    Patient has given verbal consent for Telephone visit?  Yes    What phone number would you like to be contacted at? [phone]    How would you like to obtain your AVS? Mail a copy    Subjective     Zachary Chand is a 59 year old male who presents via phone visit today for the following health issues:    HPI  ED/UC Followup:    Facility:  [facility]  Date of visit: 5/13/20  Reason for visit: Right great toe pain. Claudication  Current Status: Reporting ongoing numbness and pain.  Pain and numbness also radiates to both feet/ toes- having some discoloration in toes.     Requesting short term disability forms.   Will also be scheduling follow up with vascular doctor as well.        Patient has had a Lumbar Spine MRI and US FIONA done.     ABIs revealed:  Right leg: Resting FIONA is abnormal at FIONA: 0.8. Left leg: Resting FIONA is abnormal at FIONA: 0.8  .  Referral to the Vascular Team was completed. He was seen evaluated " on 4/30/2020- Crestor 10 mg was added.    Was recommended FIONA with exercise or TBI- not yet completed due to COVID      Lumbar Spine MRI revealed   Impression:  1. L3-4 enhancing right neuroforaminal scarring/disc osteophyte  complex. Improved and residual mild to moderate spinal canal stenosis.  Mild bilateral neuroforaminal stenosis.  2. Postoperative changes of bilateral laminectomy at L4-5. No  significant spinal canal stenosis. Mild bilateral neuroforaminal  stenosis.  3. L5-S1 with asymmetric left subarticular/neuroforaminal disc  protrusion abutting the left S1 nerve root within the subarticular  zone with moderate neuroforaminal stenosis as well, this is worsened  from 8/24/2017.     He was seen and evaluated by Neurosurgeon and apparently found no indication of lumbar radiculopathy nor any indication for surgical intervention in his lumbar spine.       Patient states that he still has persistent right great to pain. He's unable to go to work.   States that his not received his 2nd check from his Short Term Disability.     Additional forms received from [employer] Claim Department require details of his Physical Capabilities Evaluation.   Informed that he will need a face to face encounter for this capability evaluation form. Patient verbalized understanding.           Patient Active Problem List   Diagnosis     Chronic daily headache     Venous stasis ulcers (H)     Type 2 diabetes mellitus with hyperglycemia, without long-term current use of insulin (H)     Hyperlipidemia LDL goal <100     Hypertension goal BP (blood pressure) < 140/90     Venous insufficiency     Bee sting allergy     Contusion of bone     Varicose 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     Tobacco use disorder     Obesity     Type 2 diabetes mellitus without complication, without long-term current use of insulin (H)     PAD (peripheral artery disease) (H)     Past Surgical History:   Procedure Laterality Date     HERNIA REPAIR   1997    bilateral groin     ORTHOPEDIC SURGERY  1977    right shoulder dislocation       Social History     Tobacco Use     Smoking status: Current Every Day Smoker     Packs/day: 1.00     Types: Cigarettes     Smokeless tobacco: Never Used   Substance Use Topics     Alcohol use: Yes     Comment: 2 beers per day     Family History   Problem Relation Age of Onset     Alcohol/Drug Father          Current Outpatient Medications   Medication Sig Dispense Refill     aspirin 81 MG EC tablet Take 81 mg by mouth daily       blood glucose monitoring (NO BRAND SPECIFIED) meter device kit Use to test blood sugar 1 times daily or as directed.    Also refills on strips and lancets, refills for a year 1 kit 11     blood glucose monitoring (NO BRAND SPECIFIED) test strip Use to test blood sugars 2   times daily or as directed 100 strip 1     glipiZIDE (GLUCOTROL XL) 5 MG 24 hr tablet Take 1 tablet (5 mg) by mouth daily 90 tablet 0     lisinopril (ZESTRIL) 10 MG tablet Take 1 tablet (10 mg) by mouth daily 90 tablet 1     metFORMIN (GLUCOPHAGE) 1000 MG tablet TAKE 1 TABLET BY MOUTH TWICE DAILY WITH FOOD 180 tablet 1     nicotine (NICODERM CQ) 21 MG/24HR 24 hr patch Place 1 patch onto the skin every 24 hours 30 patch 1     rosuvastatin (CRESTOR) 10 MG tablet Take 1 tablet (10 mg) by mouth daily 30 tablet 3     Allergies   Allergen Reactions     Bee Pollen      Swelling      No Known Drug Allergies      Novocain [Procaine] Other (See Comments)     Per pt states having panic attacks, breathing.       Reviewed and updated as needed this visit by Provider         Review of Systems   Constitutional, HEENT, cardiovascular, pulmonary, gi and gu systems are negative, except as otherwise noted.       Objective   Reported vitals:  There were no vitals taken for this visit.   PSYCH: Alert and oriented times 3; coherent speech, normal   rate and volume, able to articulate logical thoughts, able   to abstract reason, no tangential thoughts, no  hallucinations   or delusions  His affect is normal and pleasant  RESP: No cough, no audible wheezing, able to talk in full sentences  Remainder of exam unable to be completed due to telephone visits    Diagnostic Test Results:  Labs reviewed in Epic        Assessment/Plan:  1. PAD (peripheral artery disease) (H)  Following with the Vascular Team.  Encouraged him to continue taking Crestor and encouraged tobacco cessation.     2. Great toe pain, left  Recent ER visit- thought this was due to claudication.   Will await further Vascular imaging study results.   ? Consider starting Pletal. Patient will contact his Vascular Team.     3. Type 2 diabetes mellitus with hyperglycemia, without long-term current use of insulin (H); uncontrolled.   Recent A1C on 3/13/2020 was 10.2. Goal A1C < 7.0.  On Metformin and Glipizide to improve glycemic control.    Pan to repeat in a month      Additional forms received from [employer] Claim Department require details of his Physical Capabilities Evaluation.   Informed that he will need a face to face encounter for this.. Patient verbalized understanding.      Return in about 1 day (around 5/19/2020) for Face to Face Visit with PCP. Forms to be completed. .      Phone call duration:  15 minutes    Emilia Coffey MD

## 2020-05-19 ENCOUNTER — TELEPHONE (OUTPATIENT)
Dept: FAMILY MEDICINE | Facility: CLINIC | Age: 60
End: 2020-05-19

## 2020-05-19 ENCOUNTER — OFFICE VISIT (OUTPATIENT)
Dept: FAMILY MEDICINE | Facility: CLINIC | Age: 60
End: 2020-05-19
Payer: COMMERCIAL

## 2020-05-19 VITALS — WEIGHT: 231.4 LBS | DIASTOLIC BLOOD PRESSURE: 68 MMHG | BODY MASS INDEX: 30.53 KG/M2 | SYSTOLIC BLOOD PRESSURE: 138 MMHG

## 2020-05-19 DIAGNOSIS — E11.51 TYPE 2 DIABETES MELLITUS WITH DIABETIC PERIPHERAL ANGIOPATHY WITHOUT GANGRENE, WITHOUT LONG-TERM CURRENT USE OF INSULIN (H): ICD-10-CM

## 2020-05-19 DIAGNOSIS — Z09 FOLLOW-UP EXAMINATION: Primary | ICD-10-CM

## 2020-05-19 DIAGNOSIS — I83.813 VARICOSE VEINS OF BOTH LOWER EXTREMITIES WITH PAIN: ICD-10-CM

## 2020-05-19 DIAGNOSIS — I73.9 PAD (PERIPHERAL ARTERY DISEASE) (H): ICD-10-CM

## 2020-05-19 DIAGNOSIS — I87.2 VENOUS INSUFFICIENCY: ICD-10-CM

## 2020-05-19 DIAGNOSIS — M79.604 BILATERAL LEG PAIN: ICD-10-CM

## 2020-05-19 DIAGNOSIS — M79.605 BILATERAL LEG PAIN: ICD-10-CM

## 2020-05-19 PROCEDURE — 99214 OFFICE O/P EST MOD 30 MIN: CPT | Performed by: NURSE PRACTITIONER

## 2020-05-19 NOTE — PROGRESS NOTES
Subjective     Zachary Chand is a 59 year old male who presents to clinic today for the following health issues:    HPI   Joint Pain- needs disability forms completed    Onset: on going but getting worse in the past approx 4 mos    Description:   Location: bilateral feet   Character: numb, freezing pain, can feel like he walking on marbles at times.    Intensity: moderate    Progression of Symptoms: worse    Accompanying Signs & Symptoms:  Other symptoms: numbness and tingling    History:   Previous similar pain: no       Precipitating factors:   Trauma or overuse: no     Alleviating factors:  Improved by: staying off feet    Therapies Tried and outcome: tried walking more per another provider-can walk 1-2 blocks but is very uncomfortable.      Patient Active Problem List   Diagnosis     Chronic daily headache     Venous stasis ulcers (H)     Type 2 diabetes mellitus with hyperglycemia, without long-term current use of insulin (H)     Hyperlipidemia LDL goal <100     Hypertension goal BP (blood pressure) < 140/90     Venous insufficiency     Bee sting allergy     Contusion of bone     Varicose 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     Tobacco use disorder     Obesity     Type 2 diabetes mellitus with diabetic peripheral angiopathy without gangrene, without long-term current use of insulin (H)     PAD (peripheral artery disease) (H)     Past Surgical History:   Procedure Laterality Date     HERNIA REPAIR  1997    bilateral groin     ORTHOPEDIC SURGERY  1977    right shoulder dislocation       Social History     Tobacco Use     Smoking status: Current Every Day Smoker     Packs/day: 1.00     Types: Cigarettes     Smokeless tobacco: Never Used   Substance Use Topics     Alcohol use: Yes     Comment: 2 beers per day     Family History   Problem Relation Age of Onset     Alcohol/Drug Father          Current Outpatient Medications   Medication Sig Dispense Refill     aspirin 81 MG EC tablet Take 81 mg by mouth daily        blood glucose monitoring (NO BRAND SPECIFIED) meter device kit Use to test blood sugar 1 times daily or as directed.    Also refills on strips and lancets, refills for a year 1 kit 11     blood glucose monitoring (NO BRAND SPECIFIED) test strip Use to test blood sugars 2   times daily or as directed 100 strip 1     glipiZIDE (GLUCOTROL XL) 5 MG 24 hr tablet Take 1 tablet (5 mg) by mouth daily 90 tablet 0     lisinopril (ZESTRIL) 10 MG tablet Take 1 tablet (10 mg) by mouth daily 90 tablet 1     metFORMIN (GLUCOPHAGE) 1000 MG tablet TAKE 1 TABLET BY MOUTH TWICE DAILY WITH FOOD 180 tablet 1     nicotine (NICODERM CQ) 21 MG/24HR 24 hr patch Place 1 patch onto the skin every 24 hours (Patient not taking: Reported on 5/19/2020) 30 patch 1     rosuvastatin (CRESTOR) 10 MG tablet Take 1 tablet (10 mg) by mouth daily (Patient not taking: Reported on 5/19/2020) 30 tablet 3     Allergies   Allergen Reactions     Bee Pollen      Swelling      Novocain [Procaine] Other (See Comments)     Per pt states having panic attacks, breathing.     Recent Labs   Lab Test 04/21/20  0837 03/13/20  1448 03/15/19  0736 11/12/18  1021 01/08/18  0858 10/31/17  0729 12/30/16  0700  12/14/15  0719 04/09/13  1427   A1C  --  10.2* 7.0* 9.6*  --  7.2* 7.9*   < > 10.7* 7.2*   LDL  --   --  119*  --  127*  --  142*  --   --   --    HDL  --   --  37*  --  42  --  29*  --   --   --    TRIG  --   --  124  --  157*  --  137  --   --   --    ALT  --   --   --  31  --  36 24  --  45  --    CR  --  0.81 0.87 0.78 0.78 0.78 0.85  --  0.78  --    GFRESTIMATED >90 >90 >90 >90 >90 >90 >90  Non African American GFR Calc    --  >90  Non  GFR Calc    --    GFRESTBLACK >90 >90 >90 >90 >90 >90 >90  African American GFR Calc    --  >90   GFR Calc    --    POTASSIUM  --  3.9 4.5 4.4 4.9 4.5 4.3  --  4.2  --    TSH  --   --   --   --   --   --   --   --  1.50 2.56    < > = values in this interval not displayed.      BP Readings from  Last 3 Encounters:   05/19/20 138/68   05/11/20 138/78   04/15/20 134/62    Wt Readings from Last 3 Encounters:   05/19/20 105 kg (231 lb 6.4 oz)   05/11/20 103.4 kg (228 lb)   04/30/20 104.3 kg (230 lb)            Reviewed and updated as needed this visit by Provider  Tobacco  Allergies  Meds  Problems  Med Hx  Surg Hx  Fam Hx         Review of Systems   Constitutional, HEENT, cardiovascular, pulmonary, GI, , musculoskeletal, neuro, skin, endocrine and psych systems are negative, except as otherwise noted.      Objective    /68   Wt 105 kg (231 lb 6.4 oz)   BMI 30.53 kg/m    Body mass index is 30.53 kg/m .  Physical Exam   GENERAL: healthy, alert and no distress  RESP: lungs clear to auscultation - no rales, rhonchi or wheezes  CV: regular rate and rhythm, normal S1 S2, no S3 or S4, no murmur, click or rub, no peripheral edema and peripheral pulses strong  MS: no gross musculoskeletal defects noted, no edema; wearing joelle stockings.   PSYCH: mentation appears normal, affect normal/bright    Diagnostic Test Results:  Labs reviewed in Epic  See orders        Assessment & Plan       ICD-10-CM    1. Follow-up examination  Z09    2. Varicose 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  I83.813    3. Type 2 diabetes mellitus with diabetic peripheral angiopathy without gangrene, without long-term current use of insulin (H)  E11.51    4. PAD (peripheral artery disease) (H)  I73.9    5. Venous insufficiency  I87.2    6. Bilateral leg pain  M79.604     M79.605           See Patient Instructions    Return in about 3 months (around 8/19/2020), or if symptoms worsen or fail to improve.    Ann Vizcaino, [facility]

## 2020-05-27 ENCOUNTER — TELEPHONE (OUTPATIENT)
Dept: FAMILY MEDICINE | Facility: CLINIC | Age: 60
End: 2020-05-27

## 2020-05-27 NOTE — TELEPHONE ENCOUNTER
Panel Management Review  Summary:    Patient is due/failing the following:   Fasting lab appointment lipid, a1c (per CB due on/after 6/13/2020)  Eye exam  Colonoscopy/fit test   Health Maintenance Due   Topic Date Due     EYE EXAM  1960     COLORECTAL CANCER SCREENING  07/06/1971     HIV SCREENING  07/06/1976     PNEUMOCOCCAL IMMUNIZATION 19-64 MEDIUM RISK (1 of 1 - PPSV23) 07/06/1980     ZOSTER IMMUNIZATION (1 of 2) 07/06/2011     PREVENTIVE CARE VISIT  03/15/2020     LIPID  03/15/2020        Type of outreach:    Called patient. Voicemail full. Please try again                                                                                                                   Laxmi Blood    Chart routed to Care Team .

## 2020-05-28 ENCOUNTER — VIRTUAL VISIT (OUTPATIENT)
Dept: OTHER | Facility: CLINIC | Age: 60
End: 2020-05-28
Attending: INTERNAL MEDICINE
Payer: COMMERCIAL

## 2020-05-28 DIAGNOSIS — E11.65 TYPE 2 DIABETES MELLITUS WITH HYPERGLYCEMIA, WITHOUT LONG-TERM CURRENT USE OF INSULIN (H): ICD-10-CM

## 2020-05-28 DIAGNOSIS — E78.5 HYPERLIPIDEMIA LDL GOAL <70: ICD-10-CM

## 2020-05-28 DIAGNOSIS — I73.9 PAD (PERIPHERAL ARTERY DISEASE) (H): Primary | ICD-10-CM

## 2020-05-28 DIAGNOSIS — F17.218 CIGARETTE NICOTINE DEPENDENCE WITH OTHER NICOTINE-INDUCED DISORDER: ICD-10-CM

## 2020-05-28 DIAGNOSIS — I10 BENIGN ESSENTIAL HYPERTENSION: ICD-10-CM

## 2020-05-28 PROCEDURE — 99407 BEHAV CHNG SMOKING > 10 MIN: CPT | Mod: ZP | Performed by: INTERNAL MEDICINE

## 2020-05-28 PROCEDURE — 99214 OFFICE O/P EST MOD 30 MIN: CPT | Mod: ZP | Performed by: INTERNAL MEDICINE

## 2020-05-28 NOTE — TELEPHONE ENCOUNTER
Type of outreach:    Phone, spoke to patient.  lab scheduled for 6/16/2020. Will complete FIT. Will schedule eye once eye clinics open.

## 2020-05-28 NOTE — PATIENT INSTRUCTIONS
1.  Quit smoking     2.  continue NicoDerm patch 21 mg daily      3.  Take baby aspirin enteric-coated 81 mg daily with food     4.  Tight control of the blood sugars,  please increase  Glucotrol XL to 10 mg daily and may take 2 tablets of 5 mg tablets until your supplies last       5.  Monitor blood pressure and if your blood pressure is greater than 130/80 may increase lisinopril dose to 20 mg daily and may take 2 tablets of 10 mg pills until supplies last     6.  Quit alcohol     7.  please take cholesterol-lowering medication Crestor 10 mg daily,  prescribed last visit    8. Go for  FIONA with exercise and fasting  labs before visit with me ( A1c,FLP, CMP, TSH)     9..  Follow-up with me in the clinic in 1  month ([facility], [phone] )

## 2020-05-28 NOTE — PROGRESS NOTES
"Zachary Chand is a 59 year old male who is being evaluated via a billable telephone visit.      The patient has been notified of following:     \"This telephone visit will be conducted via a call between you and your physician/provider. We have found that certain health care needs can be provided without the need for a physical exam.  This service lets us provide the care you need with a short phone conversation.  If a prescription is necessary we can send it directly to your pharmacy.  If lab work is needed we can place an order for that and you can then stop by our lab to have the test done at a later time.    Telephone visits are billed at different rates depending on your insurance coverage. During this emergency period, for some insurers they may be billed the same as an in-person visit.  Please reach out to your insurance provider with any questions.    If during the course of the call the physician/provider feels a telephone visit is not appropriate, you will not be charged for this service.\"    Patient has given verbal consent for Telephone visit?  Yes     What phone number would you like to be contacted at?yes, mobile phone number [phone]    How would you like to obtain your AVS? Mailed     No recent vitals were obtained by the patient on 5/28/20       Provider visit note:    Chief complaint:  Follow up visit  Leg symptoms improved   Decreased alcohol intake    still smokes but Cut down 1/2 pk cigarettes a day   Scheduled for labs and imaging studies on 6/16/2020 ?  BS improved   Not taking Crestor ?    History of present illness:  This is a 59-year-old male with longstanding history of type 2 diabetes mellitus poorly controlled recent A1c 10.2, smoker smoking more than 1 pack cigarettes daily since age 31 recently cut down 1/2 pk a day , hypertension poorly controlled, hyperlipidemia currently not taking any statin ( last visit Rx crestor given) experiencing for the last 1 year right more than left " side leg pain tingling numbness and also intermittent discoloration of left toe area.  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  He denies any rest pain, currently no foot ulcers or leg ulcers.  He was seen by neurologist .     Since last visit leg symptoms improved  BS better   Not checking BP     He denies any chest pain, shortness of breath or palpitations    Review of systems: Reviewed all 12 point review of systems as per HPI otherwise unremarkable    Physical exam:( no physical exam done this is virtual visit)    Reviewed recent laboratory tests, imaging studies in the epic and updated chart    Exam: Bilateral lower extremity resting ankle brachial indices dated  4/21/2020 8:20 AM     Comparison study: None     Clinical history: Pain in both lower extremities; Pain in both lower  extremities     Ordering provider: AMISHA GILLILAND     Technique: Bilateral lower extremity resting ankle brachial indices  obtained.     Findings:     Right:       Arm: 156 mmHg   PT at ankle: 130 mmHg   DP at foot: 96 mmHg      FIONA: 0.8     Left:      Arm: 150 mmHg   PT at ankle: 130 mmHg   DP at foot: 120 mmHg      FIONA: 0.8                                                                  Impression:      Right leg: Resting FIONA is abnormal.     Left leg: Resting FIONA is abnormal.     SHALONDA LENNON MD        Assessment and plan:    1. PAD (peripheral artery disease) (H) (recent resting FIONA 0.80 bilaterally) with claudication appears multifactorial (PAD mild, degenerative disc disease of lumbar spine previous surgery, poorly controlled diabetes with neuropathy, EtOH with possible neuropathy etc.     2. Osteoarthritis of spine with radiculopathy, lumbar region     3. Type 2 diabetes mellitus with hyperglycemia, without long-term current use of insulin and neuropathy (H)        This is a 59-year-old male with poorly controlled multiple risk factors ongoing smoking, poorly controlled diabetes with  neuropathy, chronic low back pain with previous disc surgery and worsened recent MRI on the left side, hypertension, hyperlipidemia currently not on any statin and poorly controlled and fluctuating blood pressure experiencing 1 year history of both feet numbness heaviness and discomfort right more than left side.  Describes pain as a shooting type.  No rest pain no foot ulcers or leg ulcers.     I had a lengthy discussion with the patient and urged again aggressive modification and control of all his risk factors as delineated below     Plan:  He must quit smoking see below   Take baby aspirin daily with food  Walk as much as he can  Fasting labs before visit  Will arrange FIONA with exercise or TBI  in a month   Increase lisinopril dose to 20 mg daily  Start taking Crestor 10 mg daily, new prescription sent last visit  Follow-up with me in 1  months           4. Cigarette nicotine dependence with other nicotine-induced disorder:     Still smokes but decreased to 1/2 radha  Using 21 mg patches, continue  He must quit smoking   Consider adding pills next visit  - SMOKING CESSATION COUNSELING >10 MIN     5. Hyperlipidemia LDL goal <70     He was prescribed atorvastatin 40 mg daily but developed some rib cage pain and stopped it.  Goal of LDL less than 70  Did not start  Crestor 10 mg daily  Prescribed last visit ?  TLC suggested  Aggressive modification of her risk factors as delineated above and below        6. Benign essential hypertension:     Reviewed chart he has labile hypertension currently taking lisinopril 10 mg daily suggested patient to increase the dose to 20 mg daily and may take existing supply of 2 tablets of 10 mg daily then call for the new prescription if tolerates.  Monitor blood pressure and goal is less than 130/80  Avoid NSAIDs  DASH diet discussed.     7. ETOH abuse:      Discussed with the patient is neuropathy symptoms multifactorial probably contributing poorly controlled diabetes, excess alcohol  use with neuropathy and multiple other things.  Strongly suggested him to quit alcohol.        Phone call duration: 35  minutes   start 1:35 PM  End: 2:10 PM       This visit is being conducted as a virtual visit due to the emphasis on mitigation of the COVID-19 virus pandemic. The clinician has decided that the risk of an in-office visit outweighs the benefit for this patient.     Copy to primary MD Demetrio Fry MD, FAHA,FSVM

## 2020-06-02 ENCOUNTER — TELEPHONE (OUTPATIENT)
Dept: OTHER | Facility: CLINIC | Age: 60
End: 2020-06-02

## 2020-06-02 ENCOUNTER — TELEPHONE (OUTPATIENT)
Dept: FAMILY MEDICINE | Facility: CLINIC | Age: 60
End: 2020-06-02

## 2020-06-02 NOTE — TELEPHONE ENCOUNTER
Patient needs fasting labs late June  FIONA w/exercise and office visit (in person) one week after labs.  Routing to scheduling to arrange.    Cherelle Chandler RN BSN  [facility]  [phone]

## 2020-06-05 NOTE — TELEPHONE ENCOUNTER
Spoke with Case he is on the road and unable to schedule at this time.     He asked for a return call later this afternoon, I said we would try but can not guarantee, but if he could call us back it will only take a moment to schedule US and in person visit with Dr. Fry.      Lucie BECERRA

## 2020-06-05 NOTE — TELEPHONE ENCOUNTER
Ann LABOY will be out of office next week as well, per patient he is still unable to work until July. Forms to  to see if she can compete the 1 page form for patient. OV to be signed & faxed also.

## 2020-06-05 NOTE — TELEPHONE ENCOUNTER
Reason for Call:  Other call back    Detailed comments: patient is calling for status on disability forms, please call to discuss. Thank you.    Phone Number Patient can be reached at: Home number on file [phone] (home)    Best Time:     Can we leave a detailed message on this number? YES    Call taken on 6/5/2020 at 12:21 PM by Prema Ramirez

## 2020-06-09 NOTE — TELEPHONE ENCOUNTER
Patient is calling back to see the status of this and would like it done ASAP!!  Please call to advise.

## 2020-06-10 DIAGNOSIS — M47.27 OSTEOARTHRITIS OF SPINE WITH RADICULOPATHY, LUMBOSACRAL REGION: ICD-10-CM

## 2020-06-10 NOTE — TELEPHONE ENCOUNTER
Requested Prescriptions   Pending Prescriptions Disp Refills     gabapentin (NEURONTIN) 300 MG capsule 90 capsule 1     Sig: Take 1 capsule (300 mg) by mouth 3 times daily Start at 300 mg PO every day x 1 day, then 300 mg PO BID x 1 day then 300 mg PO TID  Gabapentin      Last Written Prescription Date:  5/12/20  Last Fill Quantity: 90,   # refills: 0  Last Office Visit: 5/19/20 [place]  Future Office visit:       Routing refill request to provider for review/approval because:  Drug not on the G, P or [facility] refill protocol or controlled substance       There is no refill protocol information for this order

## 2020-06-12 RX ORDER — GABAPENTIN 300 MG/1
300 CAPSULE ORAL 3 TIMES DAILY
Qty: 90 CAPSULE | Refills: 1 | OUTPATIENT
Start: 2020-06-12

## 2020-06-17 DIAGNOSIS — E78.5 HYPERLIPIDEMIA LDL GOAL <70: ICD-10-CM

## 2020-06-17 DIAGNOSIS — I10 BENIGN ESSENTIAL HYPERTENSION: ICD-10-CM

## 2020-06-17 DIAGNOSIS — E11.65 TYPE 2 DIABETES MELLITUS WITH HYPERGLYCEMIA, WITHOUT LONG-TERM CURRENT USE OF INSULIN (H): ICD-10-CM

## 2020-06-17 LAB
ALBUMIN SERPL-MCNC: 3.9 G/DL (ref 3.4–5)
ALP SERPL-CCNC: 50 U/L (ref 40–150)
ALT SERPL W P-5'-P-CCNC: 27 U/L (ref 0–70)
ANION GAP SERPL CALCULATED.3IONS-SCNC: 7 MMOL/L (ref 3–14)
AST SERPL W P-5'-P-CCNC: 17 U/L (ref 0–45)
BILIRUB SERPL-MCNC: 0.4 MG/DL (ref 0.2–1.3)
BUN SERPL-MCNC: 8 MG/DL (ref 7–30)
CALCIUM SERPL-MCNC: 9.1 MG/DL (ref 8.5–10.1)
CHLORIDE SERPL-SCNC: 105 MMOL/L (ref 94–109)
CHOLEST SERPL-MCNC: 180 MG/DL
CO2 SERPL-SCNC: 24 MMOL/L (ref 20–32)
CREAT SERPL-MCNC: 0.7 MG/DL (ref 0.66–1.25)
GFR SERPL CREATININE-BSD FRML MDRD: >90 ML/MIN/{1.73_M2}
GLUCOSE SERPL-MCNC: 150 MG/DL (ref 70–99)
HBA1C MFR BLD: 8.8 % (ref 0–5.6)
HDLC SERPL-MCNC: 36 MG/DL
LDLC SERPL CALC-MCNC: 119 MG/DL
NONHDLC SERPL-MCNC: 144 MG/DL
POTASSIUM SERPL-SCNC: 4.6 MMOL/L (ref 3.4–5.3)
PROT SERPL-MCNC: 7.8 G/DL (ref 6.8–8.8)
SODIUM SERPL-SCNC: 136 MMOL/L (ref 133–144)
TRIGL SERPL-MCNC: 127 MG/DL
TSH SERPL DL<=0.005 MIU/L-ACNC: 2.66 MU/L (ref 0.4–4)

## 2020-06-17 PROCEDURE — 84443 ASSAY THYROID STIM HORMONE: CPT | Performed by: INTERNAL MEDICINE

## 2020-06-17 PROCEDURE — 83036 HEMOGLOBIN GLYCOSYLATED A1C: CPT | Performed by: INTERNAL MEDICINE

## 2020-06-17 PROCEDURE — 36415 COLL VENOUS BLD VENIPUNCTURE: CPT | Performed by: INTERNAL MEDICINE

## 2020-06-17 PROCEDURE — 80061 LIPID PANEL: CPT | Performed by: INTERNAL MEDICINE

## 2020-06-17 PROCEDURE — 80053 COMPREHEN METABOLIC PANEL: CPT | Performed by: INTERNAL MEDICINE

## 2020-07-07 ENCOUNTER — OFFICE VISIT (OUTPATIENT)
Dept: OTHER | Facility: CLINIC | Age: 60
End: 2020-07-07
Attending: INTERNAL MEDICINE
Payer: COMMERCIAL

## 2020-07-07 ENCOUNTER — HOSPITAL ENCOUNTER (OUTPATIENT)
Dept: ULTRASOUND IMAGING | Facility: CLINIC | Age: 60
End: 2020-07-07
Attending: INTERNAL MEDICINE
Payer: COMMERCIAL

## 2020-07-07 VITALS
SYSTOLIC BLOOD PRESSURE: 140 MMHG | DIASTOLIC BLOOD PRESSURE: 78 MMHG | WEIGHT: 225 LBS | OXYGEN SATURATION: 98 % | HEART RATE: 102 BPM | HEIGHT: 72 IN | RESPIRATION RATE: 18 BRPM | BODY MASS INDEX: 30.48 KG/M2

## 2020-07-07 DIAGNOSIS — E11.65 TYPE 2 DIABETES MELLITUS WITH HYPERGLYCEMIA, WITHOUT LONG-TERM CURRENT USE OF INSULIN (H): ICD-10-CM

## 2020-07-07 DIAGNOSIS — I83.813 VARICOSE VEINS OF BOTH LOWER EXTREMITIES WITH PAIN: ICD-10-CM

## 2020-07-07 DIAGNOSIS — I10 HYPERTENSION GOAL BP (BLOOD PRESSURE) < 140/90: ICD-10-CM

## 2020-07-07 DIAGNOSIS — I73.9 PAD (PERIPHERAL ARTERY DISEASE) (H): ICD-10-CM

## 2020-07-07 DIAGNOSIS — E78.5 HYPERLIPIDEMIA LDL GOAL <70: ICD-10-CM

## 2020-07-07 DIAGNOSIS — I73.9 PAD (PERIPHERAL ARTERY DISEASE) (H): Primary | ICD-10-CM

## 2020-07-07 DIAGNOSIS — F17.218 CIGARETTE NICOTINE DEPENDENCE WITH OTHER NICOTINE-INDUCED DISORDER: ICD-10-CM

## 2020-07-07 DIAGNOSIS — I10 BENIGN ESSENTIAL HYPERTENSION: ICD-10-CM

## 2020-07-07 PROCEDURE — 99407 BEHAV CHNG SMOKING > 10 MIN: CPT | Mod: ZP | Performed by: INTERNAL MEDICINE

## 2020-07-07 PROCEDURE — G0463 HOSPITAL OUTPT CLINIC VISIT: HCPCS

## 2020-07-07 PROCEDURE — 99215 OFFICE O/P EST HI 40 MIN: CPT | Mod: 25 | Performed by: INTERNAL MEDICINE

## 2020-07-07 PROCEDURE — 93922 UPR/L XTREMITY ART 2 LEVELS: CPT

## 2020-07-07 RX ORDER — LISINOPRIL 20 MG/1
20 TABLET ORAL DAILY
Qty: 90 TABLET | Refills: 3 | Status: SHIPPED | OUTPATIENT
Start: 2020-07-07 | End: 2021-07-12

## 2020-07-07 RX ORDER — ATORVASTATIN CALCIUM 20 MG/1
20 TABLET, FILM COATED ORAL DAILY
Qty: 30 TABLET | Refills: 4 | Status: SHIPPED | OUTPATIENT
Start: 2020-07-07 | End: 2021-09-21 | Stop reason: ALTCHOICE

## 2020-07-07 RX ORDER — CILOSTAZOL 50 MG/1
50 TABLET ORAL 2 TIMES DAILY
Qty: 60 TABLET | Refills: 3 | Status: SHIPPED | OUTPATIENT
Start: 2020-07-07

## 2020-07-07 RX ORDER — GLIPIZIDE 10 MG/1
10 TABLET, FILM COATED, EXTENDED RELEASE ORAL DAILY
Qty: 90 TABLET | Refills: 3 | Status: SHIPPED | OUTPATIENT
Start: 2020-07-07 | End: 2021-07-09

## 2020-07-07 ASSESSMENT — MIFFLIN-ST. JEOR: SCORE: 1873.59

## 2020-07-07 NOTE — LETTER
[facility] [place] [address]  [place] [address]  Phone: [phone]  Fax: [phone]      July 7, 2020      Zachary Chand  [address] [place] [address]          Dear Zachary Chand,    To whom it may concern,    He was seen and evaluated in my vascular clinic today for RT leg pain and chronic back pain and Left arm pain etc . FIONA showed PAD Rt leg, severe Rt leg varicose veins etc .He also had DJD of back and left arm radilulopathy. Adjusted meds .  Please excuse him from work  For 2 weeks or until he sees primary MD for further Neurology evaluation etc     Sincerely,      Demetrio Fry MD, COLLIN, FS, FNLA  Vascular Medicine Clinical Lipidologist  Clinical Hypertension specialist     [facility] [place] [address]  [place] [address]  Phone: [phone]  Fax: [phone]

## 2020-07-07 NOTE — PROGRESS NOTES
Zachary Chand is a 60 year old male who presents for:  Chief Complaint   Patient presents with     RECHECK     DOPPLER W EXC BILAT(10:00), LMG(11:00). Follow up to labs completed 6/16/2020.  *vg        Vitals:    Vitals:    07/07/20 1050 07/07/20 1051   BP: (!) 153/77 (!) 143/89   BP Location: Right arm Left arm   Patient Position: Chair Chair   Cuff Size: Adult Regular Adult Regular   Pulse: 102    Resp: 18    SpO2: 98%    Weight: 225 lb (102.1 kg)    Height: 6' (1.829 m)        BMI:  Estimated body mass index is 30.52 kg/m  as calculated from the following:    Height as of this encounter: 6' (1.829 m).    Weight as of this encounter: 225 lb (102.1 kg).    Pain Score:  Data Unavailable        Matthew Ang, CMA

## 2020-07-07 NOTE — PROGRESS NOTES
SUBJECTIVE:  CC:   Follow-up visit  Seeing first time in the office (he was initially seen virtual visit as a new patient in April 2020 for PAD evaluation etc.)  History of a chronic back pain with degenerative disc disease  Left upper extremity tingling numbness weakness  Type 2 diabetes mellitus with neuropathy  He also has a history of severe right lower extremity varicose veins  Still smoking but cut down to half a pack cigarettes daily  Requesting work slip  Seen and evaluated by neurosurgeon last month  Multiple other issues      HPI:  Zachary Chand is a 60 year old with longstanding history of type 2 diabetes mellitus poorly controlled recent A1c 10.2 which is improved with recent adjustment of the medication and A1c 8.8, smoker smoking more than 1 pack cigarettes daily since age 31 and reduced to half a pack daily,  hypertension poorly controlled, hyperlipidemia last visit started statin experiencing for the last 1 year right more than left side leg pain tingling numbness and also intermittent discoloration of left toe area.  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  He denies any rest pain, currently no foot ulcers or leg ulcers.  He was prescribed gabapentin and currently not taking and it caused some side effects.       Taking Glucotrol XL 5 mg daily, thousand milligrams twice a day of metformin  He is also taking lisinopril 10 mg daily  Blood pressure is still elevated  Recent history of left upper extremity weakness tingling numbness underwent CTA of head and neck outside the system reviewed results which was unremarkable  He was also seen and evaluated by neurosurgeon  He is off from the work last few months.   Given ongoing symptoms requesting extension of work slip until he sees primary care or neurology etc.     He denies any chest pain, shortness of breath or palpitations     Reviewed laboratory data, imaging studies in the [facility], care everywhere and updated  chart   HISTORIES:  PROBLEM LIST:   Patient Active Problem List   Diagnosis     Chronic daily headache     Venous stasis ulcers (H)     Type 2 diabetes mellitus with hyperglycemia, without long-term current use of insulin (H)     Hyperlipidemia LDL goal <100     Hypertension goal BP (blood pressure) < 140/90     Venous insufficiency     Bee sting allergy     Contusion of bone     Varicose 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     Tobacco use disorder     Obesity     Type 2 diabetes mellitus with diabetic peripheral angiopathy without gangrene, without long-term current use of insulin (H)     PAD (peripheral artery disease) (H)     PAST MEDICAL HISTORY:  Past Medical History:   Diagnosis Date     Arthritis      Chronic daily headache 3/24/2011     ED (erectile dysfunction)      Hyperlipidemia LDL goal <100 8/2/2012     Hypertension goal BP (blood pressure) < 140/90 8/2/2012     Smoker 8/2/2012     Type 2 diabetes, HbA1c goal < 7% (H) 8/2/2012     Venous stasis ulcers (H) 7/26/2012     PAST SURGICAL HISTORY:  Past Surgical History:   Procedure Laterality Date     HERNIA REPAIR  1997    bilateral groin     ORTHOPEDIC SURGERY  1977    right shoulder dislocation     CURRENT MEDICATIONS:  Current Outpatient Medications   Medication Sig Dispense Refill     aspirin 81 MG EC tablet Take 81 mg by mouth daily       blood glucose monitoring (NO BRAND SPECIFIED) meter device kit Use to test blood sugar 1 times daily or as directed.    Also refills on strips and lancets, refills for a year 1 kit 11     blood glucose monitoring (NO BRAND SPECIFIED) test strip Use to test blood sugars 2   times daily or as directed 100 strip 1     glipiZIDE (GLUCOTROL XL) 5 MG 24 hr tablet Take 1 tablet (5 mg) by mouth daily 90 tablet 0     lisinopril (ZESTRIL) 10 MG tablet Take 1 tablet (10 mg) by mouth daily 90 tablet 1     metFORMIN (GLUCOPHAGE) 1000 MG tablet TAKE 1 TABLET BY MOUTH TWICE DAILY WITH FOOD 180 tablet 1     rosuvastatin  (CRESTOR) 10 MG tablet Take 1 tablet (10 mg) by mouth daily 30 tablet 3     nicotine (NICODERM CQ) 21 MG/24HR 24 hr patch Place 1 patch onto the skin every 24 hours (Patient not taking: Reported on 7/7/2020) 30 patch 1     ALLERGIES:  Allergies   Allergen Reactions     Bee Pollen      Swelling      Novocain [Procaine] Other (See Comments)     Per pt states having panic attacks, breathing.     SOCIAL HISTORY:  Social History     Socioeconomic History     Marital status: Single     Spouse name: Not on file     Number of children: 0     Years of education: 12     Highest education level: Not on file   Occupational History     Occupation: [employer]     Employer: [employer]   Social [employer]     Financial resource strain: Not on file     Food insecurity     Worry: Not on file     Inability: Not on file     Transportation needs     Medical: Not on file     Non-medical: Not on file   Tobacco Use     Smoking status: Current Every Day Smoker     Packs/day: 1.00     Types: Cigarettes     Smokeless tobacco: Never Used   Substance and Sexual Activity     Alcohol use: Yes     Comment: 2 beers per day     Drug use: No     Sexual activity: Yes     Partners: Female   Lifestyle     Physical activity     Days per week: Not on file     Minutes per session: Not on file     Stress: Not on file   Relationships     Social connections     Talks on phone: Not on file     Gets together: Not on file     Attends Worship service: Not on file     Active member of club or organization: Not on file     Attends meetings of clubs or organizations: Not on file     Relationship status: Not on file     Intimate partner violence     Fear of current or ex partner: Not on file     Emotionally abused: Not on file     Physically abused: Not on file     Forced sexual activity: Not on file   Other Topics Concern     Parent/sibling w/ CABG, MI or angioplasty before 65F 55M? No   Social History Narrative     Not on file     FAMILY HISTORY:  Family History    Problem Relation Age of Onset     Alcohol/Drug Father      REVIEW OF SYSTEMS:  CONSTITUTIONAL:no malaise, fatigue, or other general symptoms  EYES: no subjective changes in visual acuity, no photophobia  ENT/MOUTH: no complaints of rhinorrhea, nasal congestion, sore throat, hearing changes  RESP:no SOB  CV: no c/o exertional chest pressure or SOSA  GI: No abdominal pain, constipation, change in bowel movements, nausea, pyrosis, BRBPR  :no polyuria or polydipsia, no dysuria, no gross hematuria  MUSCULOSKELATAL:no arthalgias or myalgias  INTEGUMENTARY/SKIN: no pruritis, rashes, or moles with recent change in size, shape, or pigmentation  NEURO: no gross sensory or motor symptoms, no dizziness, no confusion  ENDOCRINE: no polyuria or polydipsia, no heat or cold intolerance  HEME/ALLERGY/IMMUNE: no fevers, chills, night sweats, or unwanted weight loss  PSYCHIATRIC: no depression, anxiety, or internal stimuli  EXAM:  BP (!) 140/78 (BP Location: Left arm, Patient Position: Chair, Cuff Size: Adult Regular)   Pulse 102   Resp 18   Ht 6' (1.829 m)   Wt 225 lb (102.1 kg)   SpO2 98%   BMI 30.52 kg/m    BMI: Body mass index is 30.52 kg/m .  GENERAL APPEARANCE:  Pleasant  Healthy appearing male , alert, active, no distress cooperative.  EXAM:  EYES: clear conjunctiva, no cataracts, no obvious fundoscopic abnormalities  HENT: oropharynx, nares, and TMs are WNL  NECK: no JVD, thyromegaly or lymphadenopathy, no cervical bruits  RESP: clear to auscultation without rales, wheezes, or rhonchi  CV: RRR, no murmurs, gallops, or rubs  LYMPH: no cervical , axillary, or inguinal lymphadenopathy appreciated  GI: NABS, ND/NT, no masses or organomegally appreciated  : normal external genitalia and anus, no lumps, masses  MS: no obvoius clinicallly relevant arthropathy, no evidence vasculitis  SKIN: no nevi clinically suspicious for malignancy are noted  NEURO: CN II-XII intact, no localizing sensory or motor abnoramlities noted,  DTRs symmetrical bilaterally  PSYCH: Mental status exam reveals the pt to have normal mood and affect. There is no disorder of thought form or content. There is no response to internal stimuli. There is no suicidal or homicidal ideation.    Labs:  Reviewed recent labs and FIONA done today  ULTRASOUND FIONA DOPPLER NO EXERCISE, 1-2 LEVELS BILAT  July 7, 2020  10:56 AM      HISTORY: PAD (peripheral artery disease) (H). Bilateral leg pain.      COMPARISON: 4/21/2020.     FINDINGS:  Right FIONA:   DP: 0.83.  PT: 0.84.     Left FIONA:   DP: 0.94.   PT: 0.97.     Waveforms: Biphasic bilaterally.                                                                      IMPRESSION:   1. Right FIONA shows moderate arterial insufficiency, not significantly  changed.  2. Left ABIs normal without evidence of arterial insufficiency.  Improvement compared to the prior study.     FIONA CRITERIA:  >0.95 Normal  0.90 - 0.94 Mild  0.5 - 0.89 Moderate  0.2 - 0.49 Severe  <0.2 Critical     ROSA GARDINER,     A/P:  Assessment and plan:     1. PAD (peripheral artery disease) (H) (recent resting FIONA 0.80 bilaterally) with claudication appears multifactorial (PAD mild, degenerative disc disease of lumbar spine previous surgery, poorly controlled diabetes with neuropathy, EtOH with possible neuropathy etc.    He underwent FIONA today before the visit and unable to walk on treadmill right lower extremity moderate arterial insufficiency and left side normal.     2. Osteoarthritis of spine with radiculopathy, lumbar region     3. Type 2 diabetes mellitus with hyperglycemia, without long-term current use of insulin and neuropathy (H)    4.  Severe right lower extremity varicose veins CEAP 4 CVI and CEAP 2 CVI left lower extremity:     For full details please see my last visit note  He made progress with A1c and also decreased smoking  He also decreased drinking alcohol  Reviewed labs and imaging studies with the patient today  Plan:  He must quit smoking  see below (prescription sent)  Take baby aspirin daily with food  Walk as much as he can  Increase lisinopril dose to 20 mg daily  Initiate atorvastatin 20 mg daily and he did not tolerate Crestor in the past  Start Pletal 50 mg twice daily and eventually increase dose to 100 mg twice daily  Increase glipizide dose to 10 mg daily and new prescription sent  Monitor blood sugars at home  Work slip given and suggested patient to follow-up with the primary MD and consider neurology evaluation and future work slips and evaluation per primary care clinic.    Fasting labs before next clinic visit in 3 months order placed      5. Cigarette nicotine dependence with other nicotine-induced disorder:     He has been smoking approximately 1 pack daily for the last 28 years and previously patches helped  Last visit prescribed NicoDerm patch 21 mg daily, offered Wellbutrin  mg daily for 3 days then followed by twice a day or Chantix (he currently wants patches only) but he declined  He wants to use patches and gum  Discussed benefits of quitting smoking specifically claudication will improve and other CV related risks will improve  10 minutes of smoking cessation counseling done     - SMOKING CESSATION COUNSELING >10 MIN     6. Hyperlipidemia LDL goal <70     He was prescribed atorvastatin 40 mg daily but developed some rib cage pain and stopped it.  Goal of LDL less than 70  Did not tolerate Crestor 10 mg daily   Consider giving low-dose atorvastatin 20 mg daily  TLC suggested  Aggressive modification of her risk factors as delineated above and below  Fasting lipids in 3 or 4 months     7. Benign essential hypertension:     Reviewed chart he has labile hypertension currently taking lisinopril 10 mg daily suggested patient to increase the dose to 20 mg daily and may take existing supply of 2 tablets of 10 mg daily then call for the new prescription if tolerates.  Monitor blood pressure and goal is less than 130/80  Avoid  NSAIDs  DASH diet discussed.     8. ETOH abuse:      Discussed with the patient is neuropathy symptoms multifactorial probably contributing poorly controlled diabetes, excess alcohol use with neuropathy and multiple other things.  Strongly suggested him to quit alcohol.     Total patient care time spent today 50 minutes face-to-face and more than 50% of time spent counseling of the above-mentioned multiple issues, smoking cessation, reviewed recent neurosurgery evaluation outside records and today's imaging study and laboratory data.  He had a lot of questions all of them were answered.  Work slip was given     This note was dictated by utilizing Dragon software     Copy of this dictation to primary care provider        Demetrio Fry MD, [place], [facility]  Vascular medicine  Clinical hypertension specialist  Clinical Lipidologist

## 2020-07-07 NOTE — PATIENT INSTRUCTIONS
1. Increase glipizide dose to 10 mg daily ( may take current supply of 2 tabs in am until supplies last then take one new pill 10 mg daily     2.increase lisinopril dose to 20 mg daily may take 2 pills of current supply then take new pill once a day and take at night time    3. Quit smoking, use patches and gum    4. Start pletal 50 mg twice a day     5. Start atorvastatin 20 mg daily at night time.    Please see neurologist for arm pain , weakness etc     Follow up with primary MD  See me in 3 months and get fasting lipids and A1c before visit.

## 2020-07-08 ENCOUNTER — TELEPHONE (OUTPATIENT)
Dept: FAMILY MEDICINE | Facility: CLINIC | Age: 60
End: 2020-07-08

## 2020-07-08 NOTE — TELEPHONE ENCOUNTER
Please complete the Physical Capabilities Evaluation and Physician's Statement of Disability forms. OV notes will need to be included as well. Please see work restriction letter from vascular on 07/07/20, to in basket.   OK to wait for Ann LABOY on 07/10/20.

## 2020-07-09 ENCOUNTER — TELEPHONE (OUTPATIENT)
Dept: OTHER | Facility: CLINIC | Age: 60
End: 2020-07-09

## 2020-07-09 NOTE — TELEPHONE ENCOUNTER
Just refilled on   blood glucose (NO BRAND SPECIFIED) test strip  100 strip  3  7/7/2020   No    Sig: Use to test blood sugars 2      Lindsey BUSCHN, RN    [facility] [place]  Office: [phone]  Fax: [phone]        
[facility]    Who is the name of the provider?:  Lisandra      What is the location you see this provider at?: [place]    Reason for call:  Re refill for test strips.      Can we leave a detailed message on this number?  YES       
none

## 2020-07-10 ENCOUNTER — TELEPHONE (OUTPATIENT)
Dept: FAMILY MEDICINE | Facility: CLINIC | Age: 60
End: 2020-07-10

## 2020-07-10 ENCOUNTER — OFFICE VISIT (OUTPATIENT)
Dept: FAMILY MEDICINE | Facility: CLINIC | Age: 60
End: 2020-07-10
Payer: COMMERCIAL

## 2020-07-10 VITALS
BODY MASS INDEX: 31.49 KG/M2 | WEIGHT: 232.2 LBS | RESPIRATION RATE: 20 BRPM | OXYGEN SATURATION: 95 % | DIASTOLIC BLOOD PRESSURE: 81 MMHG | SYSTOLIC BLOOD PRESSURE: 135 MMHG | TEMPERATURE: 97.1 F | HEART RATE: 119 BPM

## 2020-07-10 DIAGNOSIS — L97.909 VENOUS STASIS ULCER, UNSPECIFIED SITE, UNSPECIFIED ULCER STAGE, UNSPECIFIED WHETHER VARICOSE VEINS PRESENT (H): ICD-10-CM

## 2020-07-10 DIAGNOSIS — M79.602 PAIN AND NUMBNESS OF LEFT UPPER EXTREMITY: ICD-10-CM

## 2020-07-10 DIAGNOSIS — Z09 HOSPITAL DISCHARGE FOLLOW-UP: Primary | ICD-10-CM

## 2020-07-10 DIAGNOSIS — I83.009 VENOUS STASIS ULCER, UNSPECIFIED SITE, UNSPECIFIED ULCER STAGE, UNSPECIFIED WHETHER VARICOSE VEINS PRESENT (H): ICD-10-CM

## 2020-07-10 DIAGNOSIS — Z12.11 SPECIAL SCREENING FOR MALIGNANT NEOPLASMS, COLON: ICD-10-CM

## 2020-07-10 DIAGNOSIS — R20.0 PAIN AND NUMBNESS OF LEFT UPPER EXTREMITY: ICD-10-CM

## 2020-07-10 DIAGNOSIS — M54.50 BILATERAL LOW BACK PAIN WITHOUT SCIATICA, UNSPECIFIED CHRONICITY: ICD-10-CM

## 2020-07-10 DIAGNOSIS — Z11.4 SCREENING FOR HUMAN IMMUNODEFICIENCY VIRUS WITHOUT PRESENCE OF RISK FACTORS: ICD-10-CM

## 2020-07-10 DIAGNOSIS — E11.65 TYPE 2 DIABETES MELLITUS WITH HYPERGLYCEMIA, WITHOUT LONG-TERM CURRENT USE OF INSULIN (H): ICD-10-CM

## 2020-07-10 DIAGNOSIS — R20.0 NUMBNESS OF FEET: ICD-10-CM

## 2020-07-10 DIAGNOSIS — I83.813 VARICOSE VEINS OF BOTH LOWER EXTREMITIES WITH PAIN: ICD-10-CM

## 2020-07-10 PROCEDURE — 99214 OFFICE O/P EST MOD 30 MIN: CPT | Performed by: NURSE PRACTITIONER

## 2020-07-10 RX ORDER — OXYCODONE AND ACETAMINOPHEN 5; 325 MG/1; MG/1
1 TABLET ORAL EVERY 6 HOURS PRN
Qty: 12 TABLET | Refills: 0 | Status: SHIPPED | OUTPATIENT
Start: 2020-07-10 | End: 2020-07-13

## 2020-07-10 RX ORDER — PREDNISONE 20 MG/1
40 TABLET ORAL DAILY
Qty: 10 TABLET | Refills: 0 | Status: SHIPPED | OUTPATIENT
Start: 2020-07-10 | End: 2020-07-15

## 2020-07-10 NOTE — PROGRESS NOTES
Subjective     Zachary Chand is a 60 year old male who presents to clinic today for the following health issues:    HPI       ED/UC Followup:    Facility:  [place]  Date of visit: 7/2/2020  Reason for visit:   Visual disturbance (Primary Dx);   Left arm weakness;   Atrophy of muscle of left upper arm  Current Status: back went out-having pain. Has not had pain since his back surgery.  Pain is not radiating into legs.  No loss of B/B.  Foot pain has improved, no more pain, just numb.    Work forms.     Patient Active Problem List   Diagnosis     Chronic daily headache     Venous stasis ulcers (H)     Type 2 diabetes mellitus with hyperglycemia, without long-term current use of insulin (H)     Hyperlipidemia LDL goal <100     Hypertension goal BP (blood pressure) < 140/90     Venous insufficiency     Bee sting allergy     Contusion of bone     Varicose 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     Tobacco use disorder     Obesity     Type 2 diabetes mellitus with diabetic peripheral angiopathy without gangrene, without long-term current use of insulin (H)     PAD (peripheral artery disease) (H)     Past Surgical History:   Procedure Laterality Date     HERNIA REPAIR  1997    bilateral groin     ORTHOPEDIC SURGERY  1977    right shoulder dislocation       Social History     Tobacco Use     Smoking status: Current Every Day Smoker     Packs/day: 1.00     Types: Cigarettes     Smokeless tobacco: Never Used   Substance Use Topics     Alcohol use: Yes     Comment: 2 beers per day     Family History   Problem Relation Age of Onset     Alcohol/Drug Father          Current Outpatient Medications   Medication Sig Dispense Refill     aspirin 81 MG EC tablet Take 81 mg by mouth daily       atorvastatin (LIPITOR) 20 MG tablet Take 1 tablet (20 mg) by mouth daily 30 tablet 4     blood glucose (NO BRAND SPECIFIED) test strip Use to test blood sugar 1 times daily or as directed. 3 Box 11     blood glucose (NO BRAND SPECIFIED) test  strip Use to test blood sugars 2   times daily or as directed 100 strip 3     blood glucose monitoring (NO BRAND SPECIFIED) meter device kit Use to test blood sugar 1 times daily or as directed.    Also refills on strips and lancets, refills for a year 1 kit 11     glipiZIDE (GLUCOTROL XL) 10 MG 24 hr tablet Take 1 tablet (10 mg) by mouth daily 90 tablet 3     lisinopril (ZESTRIL) 20 MG tablet Take 1 tablet (20 mg) by mouth daily 90 tablet 3     metFORMIN (GLUCOPHAGE) 1000 MG tablet TAKE 1 TABLET BY MOUTH TWICE DAILY WITH FOOD 180 tablet 1     nicotine (NICODERM CQ) 21 MG/24HR 24 hr patch Place 1 patch onto the skin every 24 hours 30 patch 1     oxyCODONE-acetaminophen (PERCOCET) 5-325 MG tablet Take 1 tablet by mouth every 6 hours as needed for pain 12 tablet 0     predniSONE (DELTASONE) 20 MG tablet Take 2 tablets (40 mg) by mouth daily for 5 days 10 tablet 0     cilostazol (PLETAL) 50 MG tablet Take 1 tablet (50 mg) by mouth 2 times daily (Patient not taking: Reported on 7/10/2020) 60 tablet 3     Allergies   Allergen Reactions     Bee Pollen      Swelling      Novocain [Procaine] Other (See Comments)     Per pt states having panic attacks, breathing.     Recent Labs   Lab Test 06/17/20  0823 04/21/20  0837 03/13/20  1448 03/15/19  0736 11/12/18  1021 01/08/18  0858 10/31/17  0729  12/14/15  0719   A1C 8.8*  --  10.2* 7.0* 9.6*  --  7.2*   < > 10.7*   *  --   --  119*  --  127*  --    < >  --    HDL 36*  --   --  37*  --  42  --    < >  --    TRIG 127  --   --  124  --  157*  --    < >  --    ALT 27  --   --   --  31  --  36   < > 45   CR 0.70  --  0.81 0.87 0.78 0.78 0.78   < > 0.78   GFRESTIMATED >90 >90 >90 >90 >90 >90 >90   < > >90  Non  GFR Calc     GFRESTBLACK >90 >90 >90 >90 >90 >90 >90   < > >90  African American GFR Calc     POTASSIUM 4.6  --  3.9 4.5 4.4 4.9 4.5   < > 4.2   TSH 2.66  --   --   --   --   --   --   --  1.50    < > = values in this interval not displayed.      BP  Readings from Last 3 Encounters:   07/10/20 135/81   07/07/20 (!) 140/78   05/19/20 138/68    Wt Readings from Last 3 Encounters:   07/10/20 105.3 kg (232 lb 3.2 oz)   07/07/20 102.1 kg (225 lb)   05/19/20 105 kg (231 lb 6.4 oz)                    Reviewed and updated as needed this visit by Provider  Tobacco  Allergies  Meds  Problems  Med Hx  Surg Hx  Fam Hx         Review of Systems   Constitutional, HEENT, cardiovascular, pulmonary, GI, , musculoskeletal, neuro, skin, endocrine and psych systems are negative, except as otherwise noted.      Objective    /81   Pulse 119   Temp 97.1  F (36.2  C) (Tympanic)   Resp 20   Wt 105.3 kg (232 lb 3.2 oz)   SpO2 95%   BMI 31.49 kg/m    Body mass index is 31.49 kg/m .  Physical Exam   GENERAL: healthy, alert and no distress  RESP: lungs clear to auscultation - no rales, rhonchi or wheezes  CV: regular rate and rhythm, normal S1 S2, no S3 or S4, no murmur, click or rub, no peripheral edema and peripheral pulses strong  MS: no gross musculoskeletal defects noted, no edema  NEURO: POSITIVE for weakness of left arm against resistance/ grasp, no pain with palpation of midline spine, no CVA tenderness, no radicular symptoms with dependent leg raise.   PSYCH: mentation appears normal, affect normal/bright    Diagnostic Test Results:  Labs reviewed in Epic  See orders        Assessment & Plan       ICD-10-CM    1. Hospital discharge follow-up  Z09    2. Special screening for malignant neoplasms, colon  Z12.11 Fecal colorectal cancer screen (FIT)     GASTROENTEROLOGY ADULT REF PROCEDURE ONLY   3. Varicose 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  I83.813 PAIN MANAGEMENT REFERRAL   4. Type 2 diabetes mellitus with hyperglycemia, without long-term current use of insulin (H)  E11.65 OPHTHALMOLOGY ADULT REFERRAL     blood glucose (NO BRAND SPECIFIED) test strip   5. Venous stasis ulcer, unspecified site, unspecified ulcer stage, unspecified whether varicose veins  present (H)  I83.009 PAIN MANAGEMENT REFERRAL    L97.909    6. Screening for human immunodeficiency virus without presence of risk factors  Z11.4 HIV Antigen Antibody Combo   7. Pain and numbness of left upper extremity  M79.602 NEUROLOGY ADULT REFERRAL    R20.0 PAIN MANAGEMENT REFERRAL   8. Numbness of feet  R20.0 NEUROLOGY ADULT REFERRAL     PAIN MANAGEMENT REFERRAL   9. Bilateral low back pain without sciatica, unspecified chronicity  M54.5 predniSONE (DELTASONE) 20 MG tablet     oxyCODONE-acetaminophen (PERCOCET) 5-325 MG tablet     PAIN MANAGEMENT REFERRAL          See Patient Instructions    Return in about 3 months (around 10/10/2020), or if symptoms worsen or fail to improve.    Ann Vizcaino, ANDRÉS  [facility]

## 2020-07-10 NOTE — TELEPHONE ENCOUNTER
Forms, office visit /13/2020 and 7/8/20 vascular letter  faxed to [phone]. Copy sent to scanning.

## 2020-07-10 NOTE — TELEPHONE ENCOUNTER
Reason for call:  Other   Patient called regarding (reason for call): short term disability form  Additional comments: Patient forgot to provide Ann the following info at his visit today. He got paid up until June 29th. The paperwork is going to have to state from the 29th on, and not today on. He knows how short term disability works. They'll have him and provider fill out more paperwork if this doesn t get updated before she sends it out. Please call to confirm once this has been sent and if there are any questions.    Phone number to reach patient:  Home number on file [phone] (home)    Best Time:  asap    Can we leave a detailed message on this number?  Not Applicable    Travel screening: Not Applicable     Arely FRIEDMAN  Central Scheduler

## 2020-08-05 ENCOUNTER — TELEPHONE (OUTPATIENT)
Dept: OTHER | Facility: CLINIC | Age: 60
End: 2020-08-05

## 2020-08-05 NOTE — TELEPHONE ENCOUNTER
Disability forms received; faxed forward to PCP Ann Vizcaino NP.    Cherelle MUÑIZ BSN  [facility]  [phone]

## 2020-08-11 ENCOUNTER — TRANSFERRED RECORDS (OUTPATIENT)
Dept: HEALTH INFORMATION MANAGEMENT | Facility: CLINIC | Age: 60
End: 2020-08-11

## 2020-09-09 ENCOUNTER — TRANSFERRED RECORDS (OUTPATIENT)
Dept: HEALTH INFORMATION MANAGEMENT | Facility: CLINIC | Age: 60
End: 2020-09-09

## 2020-10-14 ENCOUNTER — TRANSFERRED RECORDS (OUTPATIENT)
Dept: HEALTH INFORMATION MANAGEMENT | Facility: CLINIC | Age: 60
End: 2020-10-14

## 2020-10-15 DIAGNOSIS — E11.65 TYPE 2 DIABETES MELLITUS WITH HYPERGLYCEMIA, WITHOUT LONG-TERM CURRENT USE OF INSULIN (H): ICD-10-CM

## 2020-10-15 DIAGNOSIS — I10 HYPERTENSION GOAL BP (BLOOD PRESSURE) < 140/90: ICD-10-CM

## 2020-10-15 LAB
ALBUMIN SERPL-MCNC: 4.2 G/DL (ref 3.4–5)
ALP SERPL-CCNC: 59 U/L (ref 40–150)
ALT SERPL W P-5'-P-CCNC: 36 U/L (ref 0–70)
ANION GAP SERPL CALCULATED.3IONS-SCNC: 6 MMOL/L (ref 3–14)
AST SERPL W P-5'-P-CCNC: 19 U/L (ref 0–45)
BILIRUB SERPL-MCNC: 0.6 MG/DL (ref 0.2–1.3)
BUN SERPL-MCNC: 11 MG/DL (ref 7–30)
CALCIUM SERPL-MCNC: 8.9 MG/DL (ref 8.5–10.1)
CHLORIDE SERPL-SCNC: 98 MMOL/L (ref 94–109)
CHOLEST SERPL-MCNC: 170 MG/DL
CO2 SERPL-SCNC: 28 MMOL/L (ref 20–32)
CREAT SERPL-MCNC: 0.89 MG/DL (ref 0.66–1.25)
GFR SERPL CREATININE-BSD FRML MDRD: >90 ML/MIN/{1.73_M2}
GLUCOSE SERPL-MCNC: 171 MG/DL (ref 70–99)
HBA1C MFR BLD: 6.9 % (ref 0–5.6)
HDLC SERPL-MCNC: 42 MG/DL
LDLC SERPL CALC-MCNC: 101 MG/DL
NONHDLC SERPL-MCNC: 128 MG/DL
POTASSIUM SERPL-SCNC: 4.8 MMOL/L (ref 3.4–5.3)
PROT SERPL-MCNC: 8.2 G/DL (ref 6.8–8.8)
SODIUM SERPL-SCNC: 132 MMOL/L (ref 133–144)
TRIGL SERPL-MCNC: 136 MG/DL

## 2020-10-15 PROCEDURE — 80061 LIPID PANEL: CPT | Performed by: INTERNAL MEDICINE

## 2020-10-15 PROCEDURE — 83036 HEMOGLOBIN GLYCOSYLATED A1C: CPT | Performed by: INTERNAL MEDICINE

## 2020-10-15 PROCEDURE — 80053 COMPREHEN METABOLIC PANEL: CPT | Performed by: INTERNAL MEDICINE

## 2020-10-16 DIAGNOSIS — E11.65 TYPE 2 DIABETES MELLITUS WITH HYPERGLYCEMIA, WITHOUT LONG-TERM CURRENT USE OF INSULIN (H): ICD-10-CM

## 2020-10-19 ENCOUNTER — ANCILLARY PROCEDURE (OUTPATIENT)
Dept: GENERAL RADIOLOGY | Facility: CLINIC | Age: 60
End: 2020-10-19
Attending: PEDIATRICS
Payer: COMMERCIAL

## 2020-10-19 ENCOUNTER — OFFICE VISIT (OUTPATIENT)
Dept: ORTHOPEDICS | Facility: CLINIC | Age: 60
End: 2020-10-19
Payer: COMMERCIAL

## 2020-10-19 VITALS
HEIGHT: 72 IN | WEIGHT: 234 LBS | BODY MASS INDEX: 31.69 KG/M2 | DIASTOLIC BLOOD PRESSURE: 82 MMHG | SYSTOLIC BLOOD PRESSURE: 129 MMHG

## 2020-10-19 DIAGNOSIS — S82.839A AVULSION FRACTURE OF DISTAL END OF FIBULA: ICD-10-CM

## 2020-10-19 DIAGNOSIS — S99.912A INJURY OF LEFT ANKLE, INITIAL ENCOUNTER: Primary | ICD-10-CM

## 2020-10-19 DIAGNOSIS — M25.572 LEFT ANKLE PAIN: ICD-10-CM

## 2020-10-19 PROCEDURE — 73610 X-RAY EXAM OF ANKLE: CPT | Mod: LT | Performed by: RADIOLOGY

## 2020-10-19 PROCEDURE — 99203 OFFICE O/P NEW LOW 30 MIN: CPT | Performed by: PEDIATRICS

## 2020-10-19 ASSESSMENT — MIFFLIN-ST. JEOR: SCORE: 1914.42

## 2020-10-19 NOTE — PROGRESS NOTES
Sports Medicine Clinic Visit    PCP: Ann Vizcaino Mannie is a 60 year old male who is seen  as a self referral presenting with left ankle injury.    Injury: He reports 2 days ago he feel down some stair and twisted his ankle. He has pain and swelling with walking and standing. His pain is mainly in the medial ankle, also lateral. History of Neuropathy in the feet.    Location of Pain: left medial ankle  Duration of Pain: 2 day(s)  Rating of Pain at worst: 8/10  Rating of Pain Currently: 4/10  Symptoms are better with: Ice, Ibuprofen and Rest  Symptoms are worse with: standing and walking  Additional Features:   Positive: swelling, bruising and weakness   Negative: popping, grinding, catching, locking, instability, paresthesias and numbness  Other evaluation and/or treatments so far consists of: Rest  Prior History of related problems: nothing    Social History: retired    Review of Systems  Skin: no bruising, yes swelling  Musculoskeletal: as above  Neurologic: no numbness, paresthesias  Remainder of review of systems is negative including constitutional, CV, pulmonary, GI, except as noted in HPI or medical history.    Patient's current problem list, past medical and surgical history, and family history were reviewed.    Patient Active Problem List   Diagnosis     Chronic daily headache     Venous stasis ulcers (H)     Type 2 diabetes mellitus with hyperglycemia, without long-term current use of insulin (H)     Hyperlipidemia LDL goal <100     Hypertension goal BP (blood pressure) < 140/90     Venous insufficiency     Bee sting allergy     Contusion of bone     Varicose 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     Tobacco use disorder     Obesity     Type 2 diabetes mellitus with diabetic peripheral angiopathy without gangrene, without long-term current use of insulin (H)     PAD (peripheral artery disease) (H)     Past Medical History:   Diagnosis Date     Arthritis      Chronic daily headache  3/24/2011     ED (erectile dysfunction)      Hyperlipidemia LDL goal <100 8/2/2012     Hypertension goal BP (blood pressure) < 140/90 8/2/2012     Smoker 8/2/2012     Type 2 diabetes, HbA1c goal < 7% (H) 8/2/2012     Venous stasis ulcers (H) 7/26/2012     Past Surgical History:   Procedure Laterality Date     HERNIA REPAIR  1997    bilateral groin     ORTHOPEDIC SURGERY  1977    right shoulder dislocation     Family History   Problem Relation Age of Onset     Alcohol/Drug Father          Objective  /82   Ht 1.829 m (6')   Wt 106.1 kg (234 lb)   BMI 31.74 kg/m      GENERAL APPEARANCE: healthy, alert and no distress   GAIT: antalgic  SKIN: no suspicious lesions or rashes  HEENT: Sclera clear, anicteric  CV: good peripheral pulses  RESP: Breathing not labored  NEURO: Normal strength and tone, mentation intact and speech normal  PSYCH:  mentation appears normal and affect normal/bright    Left Foot and Ankle Exam:  Inspection:       ecchymosis noted medial and lateral left ankle       edema noted medial and lateral left ankle    Tender:       lateral malleolus  left       lateral ankle ligaments  left       deltoid ligament  left       medial malleolus  left    Non-Tender:       remainder of ankle and foot left including base of 5th metatarsal, midfoot, navicular    ROM:        limited ankle dorsiflexion, plantarflexion, inversion and eversion    Strength:       5/5 strenght with some pain    Special Tests:       neg (-)  Love test left  - unable to perform some special testing due to pain    Gait:       antalgic gait    Neurovascular:       2+ peripheral pulses bilaterally and brisk capillary refill       sensation grossly intact      Radiology  I ordered, visualized and reviewed these images with the patient  3 XR views of left ankle reviewed: likely small distal fibular avulsion, no significant degenerative change  - will follow official read      Assessment:  1. Injury of left ankle, initial encounter     2. Avulsion fracture of distal end of fibula        Plan:  - Today's Plan of Care:  Walking Boot, crutches if needed  Continue with relative rest and activity modification, Ice, Compression, and Elevation.  Can apply ice 10-15 minutes 3-4 times per day as needed.  Ankle Pumps    -We also discussed other future treatment options:  Referral to Physical Therapy  MRI    Follow Up: 1-2 weeks with x-rays    Concerning signs and symptoms were reviewed.  The patient expressed understanding of this management plan and all questions were answered at this time.    Sandra Hansen MD CAQ  Primary Care Sports Medicine  [facility]

## 2020-10-19 NOTE — LETTER
10/19/2020         RE: Zachary Chand  [address]  [place] [address]        Dear Colleague,    Thank you for referring your patient, Zachary Chand, to the [facility]. Please see a copy of my visit note below.    Sports Medicine Clinic Visit    PCP: Ann Vizcaino    Zachary Chand is a 60 year old male who is seen  as a self referral presenting with left ankle injury.    Injury: He reports 2 days ago he feel down some stair and twisted his ankle. He has pain and swelling with walking and standing. His pain is mainly in the medial ankle, also lateral. History of Neuropathy in the feet.    Location of Pain: left medial ankle  Duration of Pain: 2 day(s)  Rating of Pain at worst: 8/10  Rating of Pain Currently: 4/10  Symptoms are better with: Ice, Ibuprofen and Rest  Symptoms are worse with: standing and walking  Additional Features:   Positive: swelling, bruising and weakness   Negative: popping, grinding, catching, locking, instability, paresthesias and numbness  Other evaluation and/or treatments so far consists of: Rest  Prior History of related problems: nothing    Social History: retired    Review of Systems  Skin: no bruising, yes swelling  Musculoskeletal: as above  Neurologic: no numbness, paresthesias  Remainder of review of systems is negative including constitutional, CV, pulmonary, GI, except as noted in HPI or medical history.    Patient's current problem list, past medical and surgical history, and family history were reviewed.    Patient Active Problem List   Diagnosis     Chronic daily headache     Venous stasis ulcers (H)     Type 2 diabetes mellitus with hyperglycemia, without long-term current use of insulin (H)     Hyperlipidemia LDL goal <100     Hypertension goal BP (blood pressure) < 140/90     Venous insufficiency     Bee sting allergy     Contusion of bone     Varicose 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     Tobacco use  disorder     Obesity     Type 2 diabetes mellitus with diabetic peripheral angiopathy without gangrene, without long-term current use of insulin (H)     PAD (peripheral artery disease) (H)     Past Medical History:   Diagnosis Date     Arthritis      Chronic daily headache 3/24/2011     ED (erectile dysfunction)      Hyperlipidemia LDL goal <100 8/2/2012     Hypertension goal BP (blood pressure) < 140/90 8/2/2012     Smoker 8/2/2012     Type 2 diabetes, HbA1c goal < 7% (H) 8/2/2012     Venous stasis ulcers (H) 7/26/2012     Past Surgical History:   Procedure Laterality Date     HERNIA REPAIR  1997    bilateral groin     ORTHOPEDIC SURGERY  1977    right shoulder dislocation     Family History   Problem Relation Age of Onset     Alcohol/Drug Father          Objective  /82   Ht 1.829 m (6')   Wt 106.1 kg (234 lb)   BMI 31.74 kg/m      GENERAL APPEARANCE: healthy, alert and no distress   GAIT: antalgic  SKIN: no suspicious lesions or rashes  HEENT: Sclera clear, anicteric  CV: good peripheral pulses  RESP: Breathing not labored  NEURO: Normal strength and tone, mentation intact and speech normal  PSYCH:  mentation appears normal and affect normal/bright    Left Foot and Ankle Exam:  Inspection:       ecchymosis noted medial and lateral left ankle       edema noted medial and lateral left ankle    Tender:       lateral malleolus  left       lateral ankle ligaments  left       deltoid ligament  left       medial malleolus  left    Non-Tender:       remainder of ankle and foot left including base of 5th metatarsal, midfoot, navicular    ROM:        limited ankle dorsiflexion, plantarflexion, inversion and eversion    Strength:       5/5 strenght with some pain    Special Tests:       neg (-)  Love test left  - unable to perform some special testing due to pain    Gait:       antalgic gait    Neurovascular:       2+ peripheral pulses bilaterally and brisk capillary refill       sensation grossly  intact      Radiology  I ordered, visualized and reviewed these images with the patient  3 XR views of left ankle reviewed: likely small distal fibular avulsion, no significant degenerative change  - will follow official read      Assessment:  1. Injury of left ankle, initial encounter    2. Avulsion fracture of distal end of fibula        Plan:  - Today's Plan of Care:  Walking Boot, crutches if needed  Continue with relative rest and activity modification, Ice, Compression, and Elevation.  Can apply ice 10-15 minutes 3-4 times per day as needed.  Ankle Pumps    -We also discussed other future treatment options:  Referral to Physical Therapy  MRI    Follow Up: 1-2 weeks with x-rays    Concerning signs and symptoms were reviewed.  The patient expressed understanding of this management plan and all questions were answered at this time.    Sandra Hansen MD [facility]  Primary Care Sports Medicine  [facility]      Again, thank you for allowing me to participate in the care of your patient.        Sincerely,        Sandra Hansen MD

## 2020-10-19 NOTE — RESULT ENCOUNTER NOTE
These results were discussed during office visit.    Sandra Hansen MD, CAQ  Primary Care Sports Medicine  [facility]

## 2020-10-19 NOTE — PATIENT INSTRUCTIONS
Plan:  - Today's Plan of Care:  Walking Boot, crutches if needed  Continue with relative rest and activity modification, Ice, Compression, and Elevation.  Can apply ice 10-15 minutes 3-4 times per day as needed.  Ankle Pumps    -We also discussed other future treatment options:  Referral to Physical Therapy  MRI    Follow Up: 1-2 weeks with x-rays    If you have any further questions for your physician or physician s care team you can call [phone] and use option 3 to leave a voice message. Calls received during business hours will be returned same day.

## 2020-10-28 ENCOUNTER — VIRTUAL VISIT (OUTPATIENT)
Dept: OTHER | Facility: CLINIC | Age: 60
End: 2020-10-28
Attending: INTERNAL MEDICINE
Payer: COMMERCIAL

## 2020-10-28 DIAGNOSIS — I83.813 VARICOSE VEINS OF BOTH LOWER EXTREMITIES WITH PAIN: ICD-10-CM

## 2020-10-28 DIAGNOSIS — F17.218 CIGARETTE NICOTINE DEPENDENCE WITH OTHER NICOTINE-INDUCED DISORDER: ICD-10-CM

## 2020-10-28 DIAGNOSIS — I10 BENIGN ESSENTIAL HYPERTENSION: ICD-10-CM

## 2020-10-28 DIAGNOSIS — I10 HYPERTENSION GOAL BP (BLOOD PRESSURE) < 140/90: ICD-10-CM

## 2020-10-28 DIAGNOSIS — E11.65 TYPE 2 DIABETES MELLITUS WITH HYPERGLYCEMIA, WITHOUT LONG-TERM CURRENT USE OF INSULIN (H): ICD-10-CM

## 2020-10-28 DIAGNOSIS — E78.5 HYPERLIPIDEMIA LDL GOAL <70: ICD-10-CM

## 2020-10-28 DIAGNOSIS — I73.9 PAD (PERIPHERAL ARTERY DISEASE) (H): ICD-10-CM

## 2020-10-28 PROCEDURE — 99214 OFFICE O/P EST MOD 30 MIN: CPT | Mod: 95 | Performed by: INTERNAL MEDICINE

## 2020-10-28 NOTE — PROGRESS NOTES
"Zachary Chand is a 60 year old male who is being evaluated via a billable telephone visit.      The patient has been notified of following:     \"This telephone visit will be conducted via a call between you and your physician/provider. We have found that certain health care needs can be provided without the need for a physical exam.  This service lets us provide the care you need with a short phone conversation.  If a prescription is necessary we can send it directly to your pharmacy.  If lab work is needed we can place an order for that and you can then stop by our lab to have the test done at a later time.    Telephone visits are billed at different rates depending on your insurance coverage. During this emergency period, for some insurers they may be billed the same as an in-person visit.  Please reach out to your insurance provider with any questions.    If during the course of the call the physician/provider feels a telephone visit is not appropriate, you will not be charged for this service.\"    Patient has given verbal consent for Telephone visit?  Yes    What phone number would you like to be contacted at? [phone]    How would you like to obtain your AVS? Mail a copy    Lia Luna MA       Provider visit note:    Chief complaint:  Follow up visit  Review of recent labs  Still smoking radha a day cigarettes ( stopped patches)   Recent fall with leg fracture ?  Not taking meds as prescribed scared of meds???     For full details please see my last office visit note    History of present illness:    Zacahry Chand is a 60 year old with longstanding history of type 2 diabetes mellitus  Previous A1c 8.8 ( improved with recent med adjustment to 6.9), smoker smoking more than 1 pack cigarettes daily since age 31 , stopped using patches I prescribed last visit and under lot of stress with recent fall and planning for neck/back surgery etc,  hypertension poorly controlled, hyperlipidemia last visit started " statin , Not Taking ??? He denies any rest pain, currently no foot ulcers or leg ulcers.  He was prescribed gabapentin and currently not taking and it caused some side effects.       Taking Glucotrol XL 10 mg daily, and reduced metformin ?  Recent history of left upper extremity weakness tingling numbness underwent CTA of head and neck outside the system reviewed results which was unremarkable  He was also seen and evaluated by neurosurgeon  Planning for neck and back surgery      He denies any chest pain, shortness of breath or palpitations     Reviewed laboratory data, imaging studies with patient     Assessment and plan:    1. PAD (peripheral artery disease) (H) (recent resting FIONA 0.80 bilaterally) with claudication appears multifactorial (PAD mild, degenerative disc disease of lumbar spine previous surgery, poorly controlled diabetes with neuropathy, EtOH with possible neuropathy etc.     He underwent FIONA last visit  and unable to walk on treadmill right lower extremity moderate arterial insufficiency and left side normal.     2. Osteoarthritis of spine with radiculopathy, lumbar region     3. Type 2 diabetes mellitus with hyperglycemia, without long-term current use of insulin and neuropathy (H)     4.  Severe right lower extremity varicose veins CEAP 4 CVI and CEAP 2 CVI left lower extremity:     For full details please see my last visit note  He made progress with A1c and lipids with recent med adjustment  He also decreased drinking alcohol  Reviewed labs and imaging studies with the patient today    Plan:  He must quit smoking see below   Take baby aspirin daily with food  Walk as much as he can  Increased lisinopril dose to 20 mg daily, continue same   Initiated atorvastatin 20 mg daily last visit , continue same and he did not tolerate Crestor in the past  Started Pletal 50 mg twice daily during last visit  Continue same for now and eventually increase dose to 100 mg twice daily  Continue glipizide dose  to 10 mg daily   Monitor blood sugars at home  suggested patient to follow-up with the primary MD       in office or virtual visit in 6 months         5. Cigarette nicotine dependence with other nicotine-induced disorder:     He has been smoking approximately 1 pack daily for the last 28 years and previously patches helped  Last visit prescribed NicoDerm patch 21 mg daily, offered Wellbutrin  mg daily for 3 days then followed by twice a day or Chantix (he currently wants patches only) but he declined  Not ready to quit now under stress   Discussed benefits of quitting smoking specifically claudication will improve and other CV related risks will improve  10 minutes of smoking cessation counseling done     - SMOKING CESSATION COUNSELING >10 MIN     6. Hyperlipidemia LDL goal <70     Improved lipids ?? Not taking meds as given last visit  Educated again , take meds as prescribed.     7. Benign essential hypertension:     Take medications as advised last visit  Monitor blood pressure and goal is less than 130/80  Avoid NSAIDs  DASH diet discussed.     8. ETOH abuse:      Discussed with the patient is neuropathy symptoms multifactorial probably contributing poorly controlled diabetes, excess alcohol use with neuropathy and multiple other things.  Strongly suggested him to quit alcohol.    Review of systems: Reviewed all 12 point review of systems as per HPI otherwise unremarkable    Physical exam:( no physical exam done this is virtual visit)    Reviewed recent laboratory tests, imaging studies in the epic and updated chart    Total phone visit time spent more than 32 minutes including review of recent labs, imaging studies etc.   He had lot of questions all of them answered.    This visit is being conducted as a virtual visit due to the emphasis on mitigation of the COVID-19 virus pandemic. The clinician has decided that the risk of an in-office visit outweighs the benefit for this patient.     Demetrio Nickerson  MD [place], [place], [facility]  Vascular Medicine

## 2020-11-24 ENCOUNTER — OFFICE VISIT (OUTPATIENT)
Dept: FAMILY MEDICINE | Facility: CLINIC | Age: 60
End: 2020-11-24
Payer: COMMERCIAL

## 2020-11-24 VITALS
HEART RATE: 92 BPM | OXYGEN SATURATION: 97 % | SYSTOLIC BLOOD PRESSURE: 126 MMHG | BODY MASS INDEX: 31.46 KG/M2 | TEMPERATURE: 97.8 F | RESPIRATION RATE: 12 BRPM | DIASTOLIC BLOOD PRESSURE: 78 MMHG | WEIGHT: 232 LBS

## 2020-11-24 DIAGNOSIS — E87.1 HYPONATREMIA: ICD-10-CM

## 2020-11-24 DIAGNOSIS — Z01.818 PREOP GENERAL PHYSICAL EXAM: Primary | ICD-10-CM

## 2020-11-24 DIAGNOSIS — M54.12 CERVICAL RADICULOPATHY: ICD-10-CM

## 2020-11-24 DIAGNOSIS — E11.51 TYPE 2 DIABETES MELLITUS WITH DIABETIC PERIPHERAL ANGIOPATHY WITHOUT GANGRENE, WITHOUT LONG-TERM CURRENT USE OF INSULIN (H): ICD-10-CM

## 2020-11-24 DIAGNOSIS — I10 HYPERTENSION GOAL BP (BLOOD PRESSURE) < 140/90: ICD-10-CM

## 2020-11-24 DIAGNOSIS — E78.5 HYPERLIPIDEMIA LDL GOAL <100: ICD-10-CM

## 2020-11-24 DIAGNOSIS — I73.9 PAD (PERIPHERAL ARTERY DISEASE) (H): ICD-10-CM

## 2020-11-24 DIAGNOSIS — F17.200 TOBACCO USE DISORDER: ICD-10-CM

## 2020-11-24 LAB
ANION GAP SERPL CALCULATED.3IONS-SCNC: 4 MMOL/L (ref 3–14)
BUN SERPL-MCNC: 11 MG/DL (ref 7–30)
CALCIUM SERPL-MCNC: 8.9 MG/DL (ref 8.5–10.1)
CHLORIDE SERPL-SCNC: 99 MMOL/L (ref 94–109)
CO2 SERPL-SCNC: 27 MMOL/L (ref 20–32)
CREAT SERPL-MCNC: 0.79 MG/DL (ref 0.66–1.25)
ERYTHROCYTE [DISTWIDTH] IN BLOOD BY AUTOMATED COUNT: 11.5 % (ref 10–15)
GFR SERPL CREATININE-BSD FRML MDRD: >90 ML/MIN/{1.73_M2}
GLUCOSE SERPL-MCNC: 247 MG/DL (ref 70–99)
HCT VFR BLD AUTO: 44 % (ref 40–53)
HGB BLD-MCNC: 15.1 G/DL (ref 13.3–17.7)
MCH RBC QN AUTO: 33.7 PG (ref 26.5–33)
MCHC RBC AUTO-ENTMCNC: 34.3 G/DL (ref 31.5–36.5)
MCV RBC AUTO: 98 FL (ref 78–100)
PLATELET # BLD AUTO: 230 10E9/L (ref 150–450)
POTASSIUM SERPL-SCNC: 4.5 MMOL/L (ref 3.4–5.3)
RBC # BLD AUTO: 4.48 10E12/L (ref 4.4–5.9)
SODIUM SERPL-SCNC: 130 MMOL/L (ref 133–144)
WBC # BLD AUTO: 5.9 10E9/L (ref 4–11)

## 2020-11-24 PROCEDURE — 99215 OFFICE O/P EST HI 40 MIN: CPT | Performed by: PHYSICIAN ASSISTANT

## 2020-11-24 PROCEDURE — 85027 COMPLETE CBC AUTOMATED: CPT | Performed by: PHYSICIAN ASSISTANT

## 2020-11-24 PROCEDURE — 93000 ELECTROCARDIOGRAM COMPLETE: CPT | Performed by: PHYSICIAN ASSISTANT

## 2020-11-24 PROCEDURE — 80048 BASIC METABOLIC PNL TOTAL CA: CPT | Performed by: PHYSICIAN ASSISTANT

## 2020-11-24 PROCEDURE — 36415 COLL VENOUS BLD VENIPUNCTURE: CPT | Performed by: PHYSICIAN ASSISTANT

## 2020-11-24 NOTE — PATIENT INSTRUCTIONS
"You may continue with surgery as planned.  We are completing blood work today and we will contact you if there are any worrisome findings.    Medications:  -Please do not take Metformin or glipizide the day of your surgery.  You may start this medication after surgery.  -Please stop aspirin 7 days prior to your procedure to reduce risk of bleeding.  You may restart aspirin based on your surgeons recommendations.  -You may continue taking lisinopril as prescribed.  -Talk to your vascular doctor about restarting Pletal after your surgery  -Talk to your primary care doctor about restarting your medication for cholesterol after your surgery  -You may continue taking any other prescribed medications not listed above    -Continue to avoid smoking    Please reach out with any questions or concerns.  Preparing for Your Surgery  Getting started  A surgery nurse will call you to review your health history and instructions. They will give you an arrival time based on your scheduled surgery time.  Please be ready to share the following:    Your doctor's clinic name and phone number    Your medical, surgical and anesthesia history    A list of allergies and sensitivities    A list of medicines, including herbal treatments and over-the-counter drugs    Whether the patient has a legal guardian (ask how to send us the papers in advance)  If your child is having surgery, please ask for a copy of Preparing for Your Child's Surgery.    Preparing for surgery    Within 30 days of surgery: Have an exam at your family clinic (primary care clinic), or go to a pre-operative clinic. This exam is called a \"History and Physical,\" or H&P.    At your H&P exam, talk to your care team about all medicines you take. If you need to stop any medicines before surgery, ask when to start taking them again.  ? We do this for your safety. Many medicines can make you bleed too much during surgery. Some change how well surgery (anesthesia) drugs " work.    Call your insurance company to see what it will and won't pay for. Ask if they need to pre-approve the surgery. (If no insurance, call [phone].)    Call your surgeon's clinic if there's any change in your health. This includes signs of a cold or flu (sore throat, runny nose, cough, rash, fever). It also includes a scrape or scratch near the surgery site.    If you have questions on the day of surgery, call your surgery center.  Eating and drinking guidelines  For your safety: Unless your surgeon tells you otherwise, follow the guidelines below.    Eat and drink as usual until 8 hours before surgery. After that, no food or milk.    Drink clear liquids until 2 hours before surgery. These are liquids you can see through, like water, Gatorade and Propel Water. You may also have black coffee and tea (no cream or milk).    Nothing by mouth within 2 hours of surgery. This includes gum, candy and breath mints.    Stop alcohol the midnight before surgery.    If your family clinic tells you to take medicine on the morning of surgery, it's okay to take it with a sip of water.  Preventing infection    Shower or bathe the night before and morning of your surgery. Follow the instructions your clinic gave you. (If no instructions, use regular soap.)    Don't shave or clip hair near your surgery site. This can lead to skin infection.    Don't smoke the morning of surgery. Smoking increases the risk of infection. You may chew nicotine gum up to 2 hours before surgery. A nicotine patch is okay.  ? Note: Some surgeries require you to completely quit smoking and nicotine. Check with your surgeon.    Your care team will make every effort to keep you safe from infection. We will:  ? Clean our hands often with soap and water (or an alcohol-based hand rub).  ? Clean the skin at your surgery site with a special soap that kills germs. We'll also remove hair from the site as needed.  ? Wear special hair covers, masks, gowns and  gloves during surgery.  ? Give antibiotic medicine, if prescribed. Not all surgeries need antibiotics.  What to bring on the day of surgery    Photo ID and insurance card    Copy of your health care directive, if you have one    Glasses and hearing aides (bring cases)  ? You can't wear contacts during surgery    Inhaler and eye drops, if you use them (tell us about these when you arrive)    CPAP machine or breathing device, if you use them    A few personal items, if spending the night    If you have . . .  ? A pacemaker or ICD (cardiac defibrillator): Bring the ID card.  ? An implanted stimulator: Bring the remote control.  ? A legal guardian: Bring a copy of the certified (court-stamped) guardianship papers.  Please remove any jewelry, including body piercings. Leave jewelry and other valuables at home.  If you're going home the day of surgery  Important: If you don't follow the rules below, we must cancel your surgery.     Arrange for someone to drive you home after surgery. You may not drive, take a taxi or take public transportation by yourself (unless you'll have local anesthesia only).    Arrange for a responsible adult to stay with you overnight. If you don't, we may keep you in the hospital overnight, and you may need to pay the costs yourself.  Questions?   If you have any questions for your care team, list them here: _________________________________________________________________________________________________________________________________________________________________________________________________________________________________________________________________________________________________________________________  For informational purposes only. Not to replace the advice of your health care provider. Copyright   2911-8820 [facility]. All rights reserved. Clinically reviewed by Adelaida Teague MD. [employer] 689840 - REV 07/19.    Before Your Procedure or Hospital Admission  Testing  "for COVID-19 (Coronavirus)  Thank you for choosing [facility] for your health care needs. This is a very challenging time for everyone. The World Health Organization and the State of [place] have declared the COVID-19 virus a pandemic.   Our goal is to keep you and our team here at [facility] safe and healthy. We've taken several steps to make this happen. For example:    We screen our staff, care teams and patients for COVID-19.    Everyone at [facility] must wear a mask and stay 6 feet apart.    We are limiting hospital and clinic visitors.  Before you come in  All patients must get tested for COVID-19. Your test needs to happen 2 to 4 days before you check in to the hospital or surgery site.   A clinic scheduler will call about a week in advance to set up a testing time at one of our labs where we'll take a swab of your nose or throat.  Note: If you go to a clinic or pharmacy like [facility] or [facility] for your test, make sure it's a \"RT-PCR\" test, not a \"rapid\" COVID-19 test. (See Questions and Answers below.)  After the test, please stay at home and stay out of contact with other people. It will be harder for you to recover if you get COVID-19 before your treatment.  Please follow all current safety guidelines, including:    Limit trips outside your home.    Limit the number of people you see.    Always wear a mask outside your home.    Use social distancing. (Stay 6 feet away from others whenever you can.)    Wash your hands often.  If your test shows you have COVID-19  If your test is positive, we'll let you know. A positive test means that you have the virus.   We'll probably have to postpone your admission, surgery or procedure. Your doctor will discuss this with you. After that, we'll let you know what to do and when you can reschedule.   We may need to cancel your treatment on short notice for other reasons, too.  If your test shows you DON'T have COVID-19  Even if your test is negative, " you may still get COVID-19. It's rare but, sometimes, the test result is wrong. You could also catch the virus after taking the test.   There's a very small chance that you could catch COVID-19 in the hospital or surgery center. [facility] has taken many steps to prevent this from happening.   Day of your surgery or procedure    Please come wearing a mask or something else that covers both your nose and mouth.    When you arrive, we'll ask you some questions to find out if you have any signs or symptoms of COVID-19.    Ask your care team if you can have visitors. All visitors must wear masks and will be screened for signs of COVID-19.  ? Even if no visitors are allowed, you can still have with you:    Your legal guardian or legal decision maker    A parent and one other visitor, if you are younger than 18 years old    A partner and a , if you are in labor  ? We might need to teach you about taking care of yourself after surgery. If so, a visitor can come into the hospital to learn about it, too.  ? The rules for visitors change often, depending on how much the virus is spreading. To learn more, see Visiting a Loved One in the Hospital during the COVID-19 Outbreak.  Please call your care team, hospital or surgery center if you have any questions. We thank you for your understanding and for choosing [facility] for your care.   Questions and Answers  Does it matter where I get tested for COVID-19?  Yes. We urge you to get tested at one of our [facility] COVID-19 testing sites. We process these tests in our lab and can get the results quickly. Your [facility] care team needs to get your results before you check in.  What should I do if I can't get tested at [facility]?  You can get tested somewhere else, but you'll need to take these extra steps:  1. Contact your family doctor or clinic to arrange your test.  2. Take the test within 4 days of your surgery or procedure. We can't  "accept tests older than 4 days.  3. Make sure your doctor or clinic faxes your results to [facility] at [phone].  If we don't get your results in time, we may have to postpone or cancel your treatment.  Ask if you're getting a \"RT-PCR\" COVID-19 test. It should NOT be a \"rapid\" COVID-19 test. Many drug stores use \"rapid\" tests, but they may not be as accurate. We don't accept the results of \"rapid\" tests.  For informational purposes only. Not to replace the advice of your health care provider. Copyright   2020 [facility]. All rights reserved. Clinically reviewed by Infection Prevention and the [facility] COVID-19 Clinical Team. [employer] [id number] - Rev 10/07/20.    "

## 2020-11-24 NOTE — PROGRESS NOTES
[facility]  [address]  [place] [address]  Phone: [phone]  Primary Provider: Ann Vizcaino  Pre-op Performing Provider: AKIN RODRIGUEZ    PREOPERATIVE EVALUATION:  Today's date: 11/24/2020    Zachary Chand is a 60 year old male who presents for a preoperative evaluation.    Surgical Information:  Surgery/Procedure: LEFT CERVICAL 4-5, 5-6, 6-7 FORAMINOTOMY  Surgery Location: [facility]     Surgeon: Chris Castañeda MD  Surgery Date: 11/30/20  Time of Surgery: 11:99  Where patient plans to recover: At home with family   Fax number for surgical facility: [phone]      Type of Anesthesia Anticipated: unsure    Subjective     HPI related to upcoming procedure: Patient notes that he has been experiencing cervical radiculopathy in the right arm causing loss of strength and muscle mass. He has planned left cervical 4-5, 5-6, 6-7 foraminotomy 11/30/20.       Preop Questions 11/24/2020   1. Have you ever had a heart attack or stroke? No   2. Have you ever had surgery on your heart or blood vessels, such as a stent placement, a coronary artery bypass, or surgery on an artery in your head, neck, heart, or legs? No   3. Do you have chest pain with activity? No   4. Do you have a history of  heart failure? No   5. Do you currently have a cold, bronchitis or symptoms of other infection? No   6. Do you have a cough, shortness of breath, or wheezing? No   7. Do you or anyone in your family have previous history of blood clots? No-history of venous and arterial insufficiency with severe varicose veins in right lower extremity    8. Do you or does anyone in your family have a serious bleeding problem such as prolonged bleeding following surgeries or cuts? No   9. Have you ever had problems with anemia or been told to take iron pills? No   10. Have you had any abnormal blood loss such as black, tarry or bloody stools? No   11. Have you ever had a blood transfusion? No    12. Are you willing to have a blood transfusion if it is medically needed before, during, or after your surgery? Yes   13. Have you or any of your relatives ever had problems with anesthesia? No   14. Do you have sleep apnea, excessive snoring or daytime drowsiness? No   14a. Do you have a CPAP machine? Yes   15. Do you have any artifical heart valves or other implanted medical devices like a pacemaker, defibrillator, or continuous glucose monitor? No   16. Do you have artificial joints? No   17. Are you allergic to latex? No     Health Care Directive:  Patient does not have a Health Care Directive or Living Will: Discussed advance care planning with patient; however, patient declined at this time.    Preoperative Review of :   reviewed - no record of controlled substances prescribed.      Status of Chronic Conditions:  DIABETES - Patient has a longstanding history of DiabetesType Type II . Patient is being treated with oral agents and denies significant side effects. Control has been fair. Complicating factors include but are not limited to: hypertension, hyperlipidemia, neuropathy, foot ulcer (resolved) and CAD/PVD.     HYPERLIPIDEMIA - Patient has a long history of significant Hyperlipidemia requiring medication for treatment with recent good control. Patient reports no problems or side effects with the medication.     HYPERTENSION - Patient has longstanding history of HTN , currently denies any symptoms referable to elevated blood pressure. Specifically denies chest pain, palpitations, dyspnea, orthopnea, PND or peripheral edema. Blood pressure readings have been in normal range. Current medication regimen is as listed below. Patient denies any side effects of medication.       Review of Systems  CONSTITUTIONAL: NEGATIVE for fever, chills  INTEGUMENTARY/SKIN: NEGATIVE for worrisome rashes  EYES: NEGATIVE for vision changes  ENT/MOUTH: NEGATIVE for ear, mouth and throat problems  RESP: NEGATIVE for  significant cough or SOB  CV: NEGATIVE for chest pain, palpitations or new peripheral edema  GI: NEGATIVE for nausea, abdominal pain, heartburn, or change in bowel habits  : NEGATIVE for dysuria, or hematuria  MUSCULOSKELETAL: NEGATIVE for significant arthralgias or myalgia  NEURO: Bilateral lower extremity paresthesias at baseline. NEGATIVE for weakness, dizziness  ENDOCRINE: NEGATIVE for temperature intolerance, skin/hair changes  HEME: NEGATIVE for bleeding problems  PSYCHIATRIC: NEGATIVE for changes in mood or affect    Patient Active Problem List    Diagnosis Date Noted     PAD (peripheral artery disease) (H) 04/30/2020     Priority: Medium     Type 2 diabetes mellitus with diabetic peripheral angiopathy without gangrene, without long-term current use of insulin (H) 10/31/2017     Priority: Medium     Obesity 02/06/2017     Priority: Medium     Tobacco use disorder 04/01/2016     Priority: Medium     Varicose 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 12/14/2015     Priority: Medium     Contusion of bone 11/18/2014     Priority: Medium     Bee sting allergy 09/27/2013     Priority: Medium     Venous insufficiency 04/23/2013     Priority: Medium     Right leg only       Type 2 diabetes mellitus with hyperglycemia, without long-term current use of insulin (H) 08/02/2012     Priority: Medium     Hyperlipidemia LDL goal <100 08/02/2012     Priority: Medium     Hypertension goal BP (blood pressure) < 140/90 08/02/2012     Priority: Medium     Venous stasis ulcers (H) 07/26/2012     Priority: Medium     Chronic daily headache 03/24/2011     Priority: Medium      Past Medical History:   Diagnosis Date     Arthritis      Chronic daily headache 3/24/2011     ED (erectile dysfunction)      Hyperlipidemia LDL goal <100 8/2/2012     Hypertension goal BP (blood pressure) < 140/90 8/2/2012     Smoker 8/2/2012     Type 2 diabetes, HbA1c goal < 7% (H) 8/2/2012     Venous stasis ulcers (H) 7/26/2012     Past Surgical History:    Procedure Laterality Date     HERNIA REPAIR  1997    bilateral groin     ORTHOPEDIC SURGERY  1977    right shoulder dislocation     Current Outpatient Medications   Medication Sig Dispense Refill     aspirin 81 MG EC tablet Take 81 mg by mouth daily       glipiZIDE (GLUCOTROL XL) 10 MG 24 hr tablet Take 1 tablet (10 mg) by mouth daily 90 tablet 3     lisinopril (ZESTRIL) 20 MG tablet Take 1 tablet (20 mg) by mouth daily 90 tablet 3     metFORMIN (GLUCOPHAGE) 1000 MG tablet Take 1 tablet (1,000 mg) by mouth 2 times daily (with meals) 180 tablet 1     nicotine (NICODERM CQ) 21 MG/24HR 24 hr patch Place 1 patch onto the skin every 24 hours 30 patch 1     atorvastatin (LIPITOR) 20 MG tablet Take 1 tablet (20 mg) by mouth daily (Patient not taking: Reported on 11/24/2020) 30 tablet 4     blood glucose (NO BRAND SPECIFIED) test strip Use to test blood sugar 1 times daily or as directed. 3 Box 11     blood glucose (NO BRAND SPECIFIED) test strip Use to test blood sugars 2   times daily or as directed 100 strip 3     blood glucose monitoring (NO BRAND SPECIFIED) meter device kit Use to test blood sugar 1 times daily or as directed.    Also refills on strips and lancets, refills for a year 1 kit 11     cilostazol (PLETAL) 50 MG tablet Take 1 tablet (50 mg) by mouth 2 times daily 60 tablet 3       Allergies   Allergen Reactions     Bee Pollen      Swelling      Novocain [Procaine] Other (See Comments)     Per pt states having panic attacks, breathing.        Social History     Tobacco Use     Smoking status: Current Every Day Smoker     Packs/day: 1.00     Types: Cigarettes     Smokeless tobacco: Never Used   Substance Use Topics     Alcohol use: Yes     Comment: 2 beers per day     Family History   Problem Relation Age of Onset     Alcohol/Drug Father      History   Drug Use No         Objective     /78   Pulse 92   Temp 97.8  F (36.6  C) (Tympanic)   Resp 12   Wt 105.2 kg (232 lb)   SpO2 97%   BMI 31.46 kg/m       Physical Exam    GENERAL APPEARANCE: healthy, alert and no distress     EYES: EOMI,  PERRL     HENT: ear canals and TM's normal and nose and mouth without ulcers or lesions     NECK: no adenopathy, no asymmetry, masses, or scars and thyroid normal to palpation     RESP: lungs clear to auscultation - no rales, rhonchi or wheezes     CV: regular rates and rhythm, normal S1 S2, no S3 or S4 and no murmur, click or rub     ABDOMEN:  soft, nontender, no HSM or masses and bowel sounds normal     MS: extremities normal- no gross deformities noted, no evidence of inflammation in joints, FROM in all extremities.  Sensation intact and equal to bilateral upper extremities.   strength 5/5 bilaterally.  Interosseous strength 5/5 bilaterally.  No tenderness palpation of spine.     SKIN: no suspicious lesions or rashes     NEURO: Normal strength and tone, sensory exam grossly normal, mentation intact and speech normal     PSYCH: mentation appears normal. and affect normal/bright     LYMPHATICS: No cervical adenopathy    Recent Labs   Lab Test 10/15/20  0746 06/17/20  0823   * 136   POTASSIUM 4.8 4.6   CR 0.89 0.70   A1C 6.9* 8.8*        Diagnostics:  CBC, BMP Pending    EKG: Normal Sinus Rhythm, RSR, Left atrial enlargement, old anterior infarct , unchanged from previous tracings    Revised Cardiac Risk Index (RCRI):  The patient has the following serious cardiovascular risks for perioperative complications:   - Diabetes Mellitus (on Insulin) = 1 point     RCRI Interpretation: 1 point: Class II (low risk - 0.9% complication rate)       Assessment & Plan   The proposed surgical procedure is considered INTERMEDIATE risk.    Preop general physical exam  Cervical Radiculopathy  Patient is a 60-year-old male with past medical history significant for type 2 diabetes without use of insulin, hypertension, hyperlipidemia, peripheral artery disease, and tobacco use disorder who presents to clinic for preoperative exam. He has  planned left cervical 4-5, 5-6, 6-7 foraminotomy 20.  Vital signs normal.  Physical exam without acute abnormalities.  EKG unchanged from previous.  Will update lab work today.  - CBC with platelets  - Basic metabolic panel  (Ca, Cl, CO2, Creat, Gluc, K, Na, BUN)  - EKG 12-lead complete w/read - Clinics    Type 2 diabetes mellitus with diabetic peripheral angiopathy without gangrene, without long-term current use of insulin (H)  A1c improvin.9 10/15/2020.  Patient compliant with Metformin and glipizide.    Hypertension goal BP (blood pressure) < 140/90  Hypertension appears under control.  Patient compliant with lisinopril.    PAD (peripheral artery disease) (H)  Patient recently prescribed Pletal due to peripheral artery disease.  Patient noncompliant with this medication.  Recommended follow-up with vascular after surgical procedure for restart of medication.    Hyperlipidemia LDL goal <100  Patient has history of hyperlipidemia.  Under control per recent lab work.  Patient noncompliant with Lipitor    Tobacco use disorder  Patient has stopped smoking and is no longer using nicotine patch    Hyponatremia  Mild hyponatremia noted in lab work today, 2020.  Patient is asymptomatic.  Please monitor postoperatively.      Risks and Recommendations:  The patient has the following additional risks and recommendations for perioperative complications:  Diabetes:  - Patient is not on insulin therapy: regular NPO guidelines can be followed.     Medication Instructions:  Patient is to take all scheduled medications on the day of surgery EXCEPT for modifications listed below:   - aspirin: Discontinue aspirin 7-10 days prior to procedure to reduce bleeding risk. It should be resumed postoperatively.    - ACE/ARB: May be continued on the day of surgery.    - metformin: HOLD day of surgery.   - sulfonylurea (e.g. glyburide, glipizide): HOLD day of surgery    RECOMMENDATION:  APPROVAL GIVEN to proceed with proposed  procedure, without further diagnostic evaluation.    Signed Electronically by: Cherelle Gaona PA-C    Copy of this evaluation report is provided to requesting physician.    [facility] Preop Guidelines    Revised Cardiac Risk Index

## 2020-11-24 NOTE — LETTER
November 24, 2020      Zachary CONNELL Mannie  [address] [place] [address]        Dear ,    We are writing to inform you of your test results.      Your blood counts are within normal range.  As discussed, your EKG has not changed from your previous reading      If you have any questions or concerns, please call the clinic at the number listed above.       Sincerely,        Cherelle Gaona PA-C

## 2021-01-13 ENCOUNTER — TRANSFERRED RECORDS (OUTPATIENT)
Dept: HEALTH INFORMATION MANAGEMENT | Facility: CLINIC | Age: 61
End: 2021-01-13

## 2021-01-15 ENCOUNTER — HEALTH MAINTENANCE LETTER (OUTPATIENT)
Age: 61
End: 2021-01-15

## 2021-02-14 ENCOUNTER — HEALTH MAINTENANCE LETTER (OUTPATIENT)
Age: 61
End: 2021-02-14

## 2021-04-04 ENCOUNTER — TRANSFERRED RECORDS (OUTPATIENT)
Dept: HEALTH INFORMATION MANAGEMENT | Facility: CLINIC | Age: 61
End: 2021-04-04

## 2021-04-16 ENCOUNTER — TELEPHONE (OUTPATIENT)
Dept: OTHER | Facility: CLINIC | Age: 61
End: 2021-04-16

## 2021-04-16 NOTE — TELEPHONE ENCOUNTER
2+ attempts have been made to schedule follow up with Dr. Fry. No further attempts to be made.     Attempts:  3/8/2021 Said LESTER  4/3/2021 1st LS  4/16/2021 2nd LS    Lucie BECERRA

## 2021-05-24 ENCOUNTER — VIRTUAL VISIT (OUTPATIENT)
Dept: FAMILY MEDICINE | Facility: CLINIC | Age: 61
End: 2021-05-24
Payer: COMMERCIAL

## 2021-05-24 DIAGNOSIS — Z20.822 SUSPECTED COVID-19 VIRUS INFECTION: Primary | ICD-10-CM

## 2021-05-24 DIAGNOSIS — Z20.822 SUSPECTED COVID-19 VIRUS INFECTION: ICD-10-CM

## 2021-05-24 LAB
SARS-COV-2 RNA RESP QL NAA+PROBE: NORMAL
SPECIMEN SOURCE: NORMAL

## 2021-05-24 PROCEDURE — U0003 INFECTIOUS AGENT DETECTION BY NUCLEIC ACID (DNA OR RNA); SEVERE ACUTE RESPIRATORY SYNDROME CORONAVIRUS 2 (SARS-COV-2) (CORONAVIRUS DISEASE [COVID-19]), AMPLIFIED PROBE TECHNIQUE, MAKING USE OF HIGH THROUGHPUT TECHNOLOGIES AS DESCRIBED BY CMS-2020-01-R: HCPCS | Performed by: FAMILY MEDICINE

## 2021-05-24 PROCEDURE — U0005 INFEC AGEN DETEC AMPLI PROBE: HCPCS | Performed by: FAMILY MEDICINE

## 2021-05-24 PROCEDURE — 99441 PR PHYSICIAN TELEPHONE EVALUATION 5-10 MIN: CPT | Performed by: FAMILY MEDICINE

## 2021-05-25 LAB
LABORATORY COMMENT REPORT: NORMAL
SARS-COV-2 RNA RESP QL NAA+PROBE: NEGATIVE
SPECIMEN SOURCE: NORMAL

## 2021-05-29 ENCOUNTER — RECORDS - HEALTHEAST (OUTPATIENT)
Dept: ADMINISTRATIVE | Facility: CLINIC | Age: 61
End: 2021-05-29

## 2021-06-05 ENCOUNTER — HEALTH MAINTENANCE LETTER (OUTPATIENT)
Age: 61
End: 2021-06-05

## 2021-06-23 NOTE — TELEPHONE ENCOUNTER
Form faxed to [phone]. Original mailed to patient. Copy sent to abstracting.     JENELLE HOSKINS MA on 6/23/2021 at 4:08 PM     Mailbox is full and can not accept messages at this time.       Lucie BECERRA

## 2021-06-30 DIAGNOSIS — E11.65 TYPE 2 DIABETES MELLITUS WITH HYPERGLYCEMIA, WITHOUT LONG-TERM CURRENT USE OF INSULIN (H): ICD-10-CM

## 2021-07-08 DIAGNOSIS — E11.65 TYPE 2 DIABETES MELLITUS WITH HYPERGLYCEMIA, WITHOUT LONG-TERM CURRENT USE OF INSULIN (H): ICD-10-CM

## 2021-07-09 RX ORDER — GLIPIZIDE 10 MG/1
TABLET, FILM COATED, EXTENDED RELEASE ORAL
Qty: 90 TABLET | Refills: 3 | Status: SHIPPED | OUTPATIENT
Start: 2021-07-09 | End: 2022-08-16

## 2021-07-09 NOTE — TELEPHONE ENCOUNTER
Unable to refill per FM protocol.  Med and pharm loaded.    Lindsey BUSCHN, RN    [facility]  [facility]  Office: [phone]  Fax: [phone]

## 2021-07-10 DIAGNOSIS — I10 HYPERTENSION GOAL BP (BLOOD PRESSURE) < 140/90: ICD-10-CM

## 2021-07-12 RX ORDER — LISINOPRIL 20 MG/1
20 TABLET ORAL DAILY
Qty: 90 TABLET | Refills: 3 | Status: SHIPPED | OUTPATIENT
Start: 2021-07-12 | End: 2022-08-16

## 2021-07-12 NOTE — TELEPHONE ENCOUNTER
lisinopril (ZESTRIL) 20 MG tablet  Last Written Prescription Date:  7/7/20  Last Fill Quantity: 90,  # refills: 3     Last office visit: 10/28/20  Follow up due:  April 2021 (month in clinic or virtual appt)    Unable to fill per [facility] protocol.  90 day Medication and pharmacy loaded.  Patient notified via [facility] message that further refills require an appointment.    Cherelle Chandler RN BSN  [facility]  [phone]

## 2021-07-26 ENCOUNTER — TRANSFERRED RECORDS (OUTPATIENT)
Dept: HEALTH INFORMATION MANAGEMENT | Facility: CLINIC | Age: 61
End: 2021-07-26

## 2021-07-26 LAB
CREATININE (EXTERNAL): 1.22 MG/DL (ref 0.72–1.25)
GFR ESTIMATED (EXTERNAL): >60 ML/MIN/1.73M2
GFR ESTIMATED (IF AFRICAN AMERICAN) (EXTERNAL): >60 ML/MIN/1.73M2
GLUCOSE (EXTERNAL): 345 MG/DL (ref 70–99)
POTASSIUM (EXTERNAL): 4.2 MMOL/L (ref 3.5–5)

## 2021-07-28 LAB
CHOLESTEROL (EXTERNAL): 171 MG/DL (ref 100–199)
HDLC SERPL-MCNC: 34 MG/DL
LDL CHOLESTEROL (EXTERNAL): 92 MG/DL
NON HDL CHOLESTEROL (EXTERNAL): 137 MG/DL
TRIGLYCERIDES (EXTERNAL): 225 MG/DL

## 2021-08-09 ENCOUNTER — TRANSFERRED RECORDS (OUTPATIENT)
Dept: HEALTH INFORMATION MANAGEMENT | Facility: CLINIC | Age: 61
End: 2021-08-09

## 2021-08-26 ENCOUNTER — TRANSFERRED RECORDS (OUTPATIENT)
Dept: HEALTH INFORMATION MANAGEMENT | Facility: CLINIC | Age: 61
End: 2021-08-26

## 2021-09-13 ENCOUNTER — TELEPHONE (OUTPATIENT)
Dept: FAMILY MEDICINE | Facility: CLINIC | Age: 61
End: 2021-09-13

## 2021-09-13 NOTE — TELEPHONE ENCOUNTER
Patient is calling requesting a letter stating he can purchase a handicap license to [place]     Patient will pick it up.      Please call patient  ok to tamra Tavarez/

## 2021-09-14 NOTE — TELEPHONE ENCOUNTER
Called patient.  He stated that due to his age, he qualified for a bow license anyway, and to disregard this message.    Patient is also needing a follow up with provider, after he had a heart attach in July.  Only openings that are face to face are same day or hospital follow up slots.    Can we use one of those.    Clare MUÑIZ BSN  Triage Nurse  [facility]: [facility]

## 2021-09-21 ENCOUNTER — OFFICE VISIT (OUTPATIENT)
Dept: FAMILY MEDICINE | Facility: CLINIC | Age: 61
End: 2021-09-21
Payer: COMMERCIAL

## 2021-09-21 VITALS
BODY MASS INDEX: 32.21 KG/M2 | WEIGHT: 237.5 LBS | RESPIRATION RATE: 20 BRPM | OXYGEN SATURATION: 97 % | DIASTOLIC BLOOD PRESSURE: 60 MMHG | TEMPERATURE: 97.6 F | SYSTOLIC BLOOD PRESSURE: 108 MMHG | HEART RATE: 93 BPM

## 2021-09-21 DIAGNOSIS — I10 HYPERTENSION GOAL BP (BLOOD PRESSURE) < 140/90: ICD-10-CM

## 2021-09-21 DIAGNOSIS — G89.29 CHRONIC BILATERAL LOW BACK PAIN WITH BILATERAL SCIATICA: ICD-10-CM

## 2021-09-21 DIAGNOSIS — Z71.85 VACCINE COUNSELING: ICD-10-CM

## 2021-09-21 DIAGNOSIS — Z95.5 S/P CORONARY ARTERY STENT PLACEMENT: ICD-10-CM

## 2021-09-21 DIAGNOSIS — M79.602 PAIN OF LEFT UPPER EXTREMITY: ICD-10-CM

## 2021-09-21 DIAGNOSIS — E11.65 TYPE 2 DIABETES MELLITUS WITH HYPERGLYCEMIA, WITHOUT LONG-TERM CURRENT USE OF INSULIN (H): ICD-10-CM

## 2021-09-21 DIAGNOSIS — M54.42 CHRONIC BILATERAL LOW BACK PAIN WITH BILATERAL SCIATICA: ICD-10-CM

## 2021-09-21 DIAGNOSIS — Z09 HOSPITAL DISCHARGE FOLLOW-UP: Primary | ICD-10-CM

## 2021-09-21 DIAGNOSIS — I21.02 ST ELEVATION MYOCARDIAL INFARCTION INVOLVING LEFT ANTERIOR DESCENDING (LAD) CORONARY ARTERY (H): ICD-10-CM

## 2021-09-21 DIAGNOSIS — M54.41 CHRONIC BILATERAL LOW BACK PAIN WITH BILATERAL SCIATICA: ICD-10-CM

## 2021-09-21 PROBLEM — I21.09 ACUTE ST ELEVATION MYOCARDIAL INFARCTION (STEMI) OF ANTERIOR WALL (H): Status: ACTIVE | Noted: 2021-07-26

## 2021-09-21 PROBLEM — M79.603 PAIN IN UPPER LIMB: Status: ACTIVE | Noted: 2021-09-21

## 2021-09-21 PROBLEM — E78.5 DYSLIPIDEMIA: Status: ACTIVE | Noted: 2021-07-26

## 2021-09-21 PROBLEM — I25.10 CORONARY ATHEROSCLEROSIS: Status: ACTIVE | Noted: 2021-08-26

## 2021-09-21 PROBLEM — I50.20 HFREF (HEART FAILURE WITH REDUCED EJECTION FRACTION) (H): Status: ACTIVE | Noted: 2021-07-27

## 2021-09-21 PROBLEM — F17.210 DEPENDENCE ON NICOTINE FROM CIGARETTES: Status: ACTIVE | Noted: 2020-12-27

## 2021-09-21 PROBLEM — M54.50 LOW BACK PAIN: Status: ACTIVE | Noted: 2021-09-21

## 2021-09-21 PROCEDURE — 99214 OFFICE O/P EST MOD 30 MIN: CPT | Performed by: PHYSICIAN ASSISTANT

## 2021-09-21 RX ORDER — PRASUGREL 10 MG/1
10 TABLET, FILM COATED ORAL DAILY
COMMUNITY
Start: 2021-08-26

## 2021-09-21 RX ORDER — METOPROLOL SUCCINATE 25 MG/1
12.5 TABLET, EXTENDED RELEASE ORAL 2 TIMES DAILY
COMMUNITY
Start: 2021-08-02 | End: 2023-01-06

## 2021-09-21 RX ORDER — ROSUVASTATIN CALCIUM 20 MG/1
20 TABLET, COATED ORAL DAILY
COMMUNITY
Start: 2021-07-28 | End: 2022-11-22

## 2021-09-21 ASSESSMENT — PAIN SCALES - GENERAL: PAINLEVEL: MODERATE PAIN (4)

## 2021-09-21 ASSESSMENT — ENCOUNTER SYMPTOMS
SHORTNESS OF BREATH: 0
FEVER: 0
LIGHT-HEADEDNESS: 0
COUGH: 0
ABDOMINAL PAIN: 0
BACK PAIN: 1
NERVOUS/ANXIOUS: 0

## 2021-09-21 NOTE — PROGRESS NOTES
Assessment & Plan     Hospital discharge follow-up  ST elevation myocardial infarction involving left anterior descending (LAD) coronary artery (H)  S/P coronary artery stent placement  Type 2 diabetes mellitus with hyperglycemia, without long-term current use of insulin (H)  Hypertension goal BP (blood pressure) < 140/90  Patient is a 61-year-old male who presents to clinic for hospital discharge follow-up. Patient was hospitalized due to STEMI-LAD with stent placement 7/26/21-7/27/21. Patient has been compliant with cardiac rehab and has scheduled cardiology follow-up. He is also been compliant with prescribed medications and denies any symptoms including chest pain  and dyspnea. Recommended continuing medications as prescribed as well as cardiac rehab and cardiology follow-up. Patient notes he quit smoking approximately 1 year ago and has decreased alcohol intake. Blood pressure within normal range. Patient has improved diet and is compliant with Metformin.    Chronic bilateral low back pain with bilateral sciatica  Patient notes chronic low back pain with sciatica for which she completed physical therapy in the past. Patient did receive [facility] referral for further evaluation. Recommended continuing with this referral.    Pain of left upper extremity  Patient notes decreased strength of left upper extremity which has been persistent since cervical spine surgery. Patient did discuss this with surgeon and noted symptoms may be long-term, but may improve with use. Recommended continuing estrogen advised.    Vaccine counseling  Discussed recommendations for COVID-19 vaccine. Informed patient that any vaccine option would be acceptable and that we do recommend vaccination. Discussed mRNA vaccines and how they work. Unable to provide vaccine in clinic as diluent not available for Pfizer vaccine. Patient will proceed to [facility] pharmacy to receive vaccination.    See Patient Instructions    Return if  "symptoms worsen or fail to improve.    ALICE Blakely [facility] JENNIFER Sharma is a 61 year old who presents for the following health issues     HPI       Hospital Follow-up Visit:    Hospital/Nursing Home/IP Rehab Facility: [facility]  Date of Admission: 7/26/2021  Date of Discharge: 7/27/2021  Reason(s) for Admission: ST elevation myocardial infarction involving left anterior decending coronary artery       Was your hospitalization related to COVID-19? No   Problems taking medications regularly:  He notes that he is unaware of what medications he takes and that he does not want to take \"25 different medications\"  Medication changes since discharge: Metoprolol 12.5mg bid  Problems adhering to non-medication therapy:  None    Summary of hospitalization:  See outside records, reviewed and scanned  Diagnostic Tests/Treatments reviewed.  Follow up needed: Cardiology, Cardiac Rehab   Other Healthcare Providers Involved in Patient s Care:         None  Update since discharge: No recurrence of chest pain. He notes that back pain is worse. He is going to cardiac rehab twice weekly. No weight changes or swelling. He notes that he quite smoking last year and that he is really trying to watch how many beers he drinks. He notes that if he has a \"few\" beers at night he does not feel well the next morning, he is wondering if this is a side effect of medications?     Post Discharge Medication Reconciliation: discharge medications reconciled, continue medications without change.  Plan of care communicated with patient        Per chart review, patient admitted to [facility] due to STEMI involving LAD.  Patient history includes DM, hypertension, dyslipidemia, and smoking.  Patient is S/p proximal LAD stent placement.  Cardiology plans include cardiac rehab, Effient x12 months, continue beta-blocker and ACE inhibitor, repeat echo 3 months, Crestor, diabetes management, smoking cessation, " aspirin.     Patient has scheduled cardiac rehab as well as cardiology follow-up 10/1/2021.  Patient notes he stopped smoking approximately 1 year ago.  He has decreased alcohol/beer intake to approximately 2 beers once weekly.    Patient notes that he had neck surgery in the past and was going to physical therapy prior to his MI. Patient notes weakness in the left forearm that has been persistent since neck surgery. He discussed this with surgeon who noted symptoms may improve with time and use. Patient also notes low back pain and has referral to [facility]. He was also doing PT therapy for low back prior to MI.     Patient would like to discuss getting COVID-19 vaccine today. He wonders which vaccine is recommended and if we recommend the vaccine in general. Patient notes many contacts have contracted COVID-19 and feels he is ready for vaccination at this time.    Review of Systems   Constitutional: Negative for fever.   Respiratory: Negative for cough and shortness of breath.    Cardiovascular: Negative for chest pain.   Gastrointestinal: Negative for abdominal pain.   Musculoskeletal: Positive for back pain.   Skin: Negative for rash.   Neurological: Negative for light-headedness.   Psychiatric/Behavioral: The patient is not nervous/anxious.             Objective    /60   Pulse 93   Temp 97.6  F (36.4  C) (Tympanic)   Resp 20   Wt 107.7 kg (237 lb 8 oz)   SpO2 97%   BMI 32.21 kg/m    Body mass index is 32.21 kg/m .  Physical Exam  Vitals and nursing note reviewed.   Constitutional:       General: He is not in acute distress.     Appearance: Normal appearance.   HENT:      Head: Normocephalic and atraumatic.   Eyes:      Extraocular Movements: Extraocular movements intact.      Pupils: Pupils are equal, round, and reactive to light.   Cardiovascular:      Rate and Rhythm: Normal rate and regular rhythm.      Heart sounds: Normal heart sounds. No murmur heard.     Pulmonary:      Effort: Pulmonary effort  is normal.      Breath sounds: Normal breath sounds. No wheezing.   Musculoskeletal:         General: Normal range of motion.      Cervical back: Normal range of motion.      Right lower leg: No edema.      Left lower leg: No edema.      Comments: Slight muscle wasting the left forearm as compared to right. Normal range of motion bilateral upper extremities.     Cautious gait.   Skin:     General: Skin is warm and dry.   Neurological:      General: No focal deficit present.      Mental Status: He is alert.   Psychiatric:         Mood and Affect: Mood normal.         Behavior: Behavior normal.

## 2021-09-25 ENCOUNTER — HEALTH MAINTENANCE LETTER (OUTPATIENT)
Age: 61
End: 2021-09-25

## 2021-10-07 ENCOUNTER — TRANSFERRED RECORDS (OUTPATIENT)
Dept: HEALTH INFORMATION MANAGEMENT | Facility: CLINIC | Age: 61
End: 2021-10-07
Payer: COMMERCIAL

## 2021-10-23 DIAGNOSIS — E11.65 TYPE 2 DIABETES MELLITUS WITH HYPERGLYCEMIA, WITHOUT LONG-TERM CURRENT USE OF INSULIN (H): ICD-10-CM

## 2021-12-20 ENCOUNTER — TRANSFERRED RECORDS (OUTPATIENT)
Dept: HEALTH INFORMATION MANAGEMENT | Facility: CLINIC | Age: 61
End: 2021-12-20
Payer: COMMERCIAL

## 2021-12-27 DIAGNOSIS — E11.65 TYPE 2 DIABETES MELLITUS WITH HYPERGLYCEMIA, WITHOUT LONG-TERM CURRENT USE OF INSULIN (H): ICD-10-CM

## 2021-12-27 NOTE — TELEPHONE ENCOUNTER
"Prescription approved per [facility] Refill Protocol.  Requested Prescriptions   Pending Prescriptions Disp Refills     CONTOUR NEXT TEST test strip [Pharmacy Med Name: CONTOUR NEXT TEST STRIPS 50S] 100 strip 0     Sig: USE 1 STRIP IN VITRO ROUTE DAILY.       Diabetic Supplies Protocol Passed - 12/27/2021  3:19 PM        Passed - Medication is active on med list        Passed - Patient is 18 years of age or older        Passed - Recent (6 mo) or future (30 days) visit within the authorizing provider's specialty     Patient had office visit in the last 6 months or has a visit in the next 30 days with authorizing provider.  See \"Patient Info\" tab in inbasket, or \"Choose Columns\" in Meds & Orders section of the refill encounter.                 "

## 2022-01-04 DIAGNOSIS — E11.65 TYPE 2 DIABETES MELLITUS WITH HYPERGLYCEMIA, WITHOUT LONG-TERM CURRENT USE OF INSULIN (H): ICD-10-CM

## 2022-01-15 ENCOUNTER — HEALTH MAINTENANCE LETTER (OUTPATIENT)
Age: 62
End: 2022-01-15

## 2022-02-09 NOTE — LETTER
November 16, 2017    Zachary Chand  [address] [facility] [place] [address]    Dear Zachary    We care about your health and have reviewed your health plan. We have reviewed your medical conditions, medication list, and lab results and are making recommendations based on this review, to better manage your health.    You are in particular need of attention regarding:  - Completing a Colon Cancer Screening (FIT) - Please complete FIT test that can be sent to you and mail completed test to clinic.      Here is a list of Health Maintenance topics that are due now or due soon:  Health Maintenance Due   Topic Date Due     EYE EXAM Q1 YEAR  07/06/1962     PNEUMOVAX 1X HI RISK PATIENT < 65 (NO IB MSG)  07/06/1963     FIT Q1 YR  07/06/1971     TOBACCO CESSATION COUNSELING Q1 YR  12/14/2016     INFLUENZA VACCINE (SYSTEM ASSIGNED)  09/01/2017     FOOT EXAM Q1 YEAR  11/18/2017     TSH W/ FREE T4 REFLEX Q2 YEAR  12/14/2017     We will be calling you in the next couple of weeks to help you schedule any appointments that are needed.  Please call us at [phone] (or use [facility]) to address the above recommendations.     Thank you for trusting [facility] and we appreciate the opportunity to serve you.  We look forward to supporting your healthcare needs in the future.    Healthy Regards,    Your [facility]           Nutrition Care Plan     Nutrition Diagnosis:  Severe Protein Calorie Malnutrition related to increased nutrient needs for wound healing and cirrhosis, poor appetite and early satiety, admitted with GI bleed and frequently on liquid diets since admission to [facility] (1/18) as evidenced by intake meeting <75% of estimated needs x 1 month, moderate to severe muscle wasting to temples, clavicles, scapular region, upper arms.    Intervention:  Modified diet: Mod CCHO, 2 gm Sodium   - Encouraged intake at each meal time.   - Discussed importance of protein intake for wound healing.     Commercial beverage: Patient providing own [employer] latte shakes BID.     Monitoring and Evaluation:  Amount of food: Goal for patient to tolerate diet and consume >/= 75% of meals and supplements.

## 2022-03-03 DIAGNOSIS — E11.65 TYPE 2 DIABETES MELLITUS WITH HYPERGLYCEMIA, WITHOUT LONG-TERM CURRENT USE OF INSULIN (H): ICD-10-CM

## 2022-03-06 ENCOUNTER — HEALTH MAINTENANCE LETTER (OUTPATIENT)
Age: 62
End: 2022-03-06

## 2022-03-08 NOTE — PROGRESS NOTES
[facility]  [id number]  [place] [address]  Phone: [phone]  Primary Provider: Ann Vizcaino  Pre-op Performing Provider: ANN VIZCAINO    PREOPERATIVE EVALUATION:  Today's date: 3/10/2022    Zachary Chand is a 61 year old male who presents for a preoperative evaluation.    Surgical Information:  Surgery/Procedure: leg stent placement- RIGHT  Surgery Location: [facility]  Surgeon: Francis  Surgery Date: 3/17/22  Time of Surgery: 8am  Where patient plans to recover: At home with family  Fax number for surgical facility: [phone]    Type of Anesthesia Anticipated: General    Assessment & Plan     The proposed surgical procedure is considered INTERMEDIATE risk.    Preop general physical exam  Comment: His ECG showed sinus rhythm and is consistent with previous findings. Labs pending. Cleared for surgery based on today's exam and current health.   - COMPREHENSIVE METABOLIC PANEL  - EKG 12-lead complete w/read - Clinics      Type 2 diabetes mellitus with hyperglycemia, without long-term current use of insulin (H)  Comment: HgbA1C is 8.0 today, will discuss follow up visit to discuss medications and diabetic control. Education on monitoring BG, lifestyle choices and current BG goals.   - OPTOMETRY REFERRAL  - HEMOGLOBIN A1C  - Albumin Random Urine Quantitative with Creat Ratio      Venous stasis ulcer, unspecified site, unspecified ulcer stage, unspecified whether varicose veins present (H)  Venous insufficiency  Comment: Educate patient on pain related to venous insufficiency and monitor legs for edema. Encourage patient to discuss with surgeon current discomfort in bilateral lower extremities.       Hypertension goal BP (blood pressure) < 140/90  Comment: Continue BP medications. Patient's BP is well controlled. He is wondering about decreasing BP medications. Education that we can try t reduce lisinopril to 10 mg but he needs to monitor his BP at home, and with his history  of Diabetes it is best to continue this medication even at a low dose because it can help protect the kidneys.     ST elevation myocardial infarction involving left anterior descending (LAD) coronary artery (H)  S/P coronary artery stent placement  Comment: history of MI with stent placement in July 2021. Patient reports no recent cardiac pain or heart burn. Heart burn occurred about 2 weeks prior to his previous MI.     PAD (peripheral artery disease) (H)  Comment: patient reports bilateral lower extremity pain worse with walking. He is planning on discussing this with his surgeon who is performing the stent placement     Hyperlipidemia LDL goal <100  Comment: continue statin medication     Hyponatremia  Comment: CMP ordered today    Screening for HIV (human immunodeficiency virus)  Comment: routine screening  - HIV Antigen Antibody Combo      Screen for colon cancer  Comment: routine screening   - Adult Gastro Ref - Procedure Only           Risks and Recommendations:  The patient has the following additional risks and recommendations for perioperative complications:  Diabetes:  - Patient is not on insulin therapy: regular NPO guidelines can be followed.     Medication Instructions:  Patient is to take all scheduled medications on the day of surgery EXCEPT for modifications listed below:   - aspirin: Defer to surgeron.    - cilostazal (Pletal): HOLD 2-3 days prior to surgery.   - prasugrel (Effient): Patient has a cardiac stent. Defer to surgeon on when to hold    - ACE/ARB: May be continued on the day of surgery.    - Beta Blockers: Continue taking on the day of surgery.   - Statins: Continue taking on the day of surgery.    - metformin: HOLD day of surgery.   - sulfonylurea (e.g. glyburide, glipizide): HOLD day of surgery      RECOMMENDATION:  APPROVAL GIVEN to proceed with proposed procedure, without further diagnostic evaluation.    Review of external notes as documented above     30 minutes spent on the date  of the encounter doing chart review, history and exam, documentation and further activities per the note      Subjective     HPI related to upcoming procedure: Leg stent surgery; hx blood clot, leg pain      Preop Questions 3/10/2022   1. Have you ever had a heart attack or stroke? YES - July 2021 with stent   2. Have you ever had surgery on your heart or blood vessels, such as a stent placement, a coronary artery bypass, or surgery on an artery in your head, neck, heart, or legs? YES - Stent placement July 2021    3. Do you have chest pain with activity? No   4. Do you have a history of heart failure? Yes   5. Do you currently have a cold, bronchitis or symptoms of other infection? No   6. Do you have a cough, shortness of breath, or wheezing? No   7. Do you or anyone in your family have previous history of blood clots? YES - R leg clots    8. Do you or does anyone in your family have a serious bleeding problem such as prolonged bleeding following surgeries or cuts? No   9. Have you ever had problems with anemia or been told to take iron pills? No   10. Have you had any abnormal blood loss such as black, tarry or bloody stools? No   11. Have you ever had a blood transfusion? No   12. Are you willing to have a blood transfusion if it is medically needed before, during, or after your surgery? Yes   13. Have you or any of your relatives ever had problems with anesthesia? No   14. Do you have sleep apnea, excessive snoring or daytime drowsiness? YES--> been told he snores, difficulty sleeping due to pain, daytime drowsiness due to poor sleep   14a. Do you have a CPAP machine? No   15. Do you have any artifical heart valves or other implanted medical devices like a pacemaker, defibrillator, or continuous glucose monitor? No   16. Do you have artificial joints? No   17. Are you allergic to latex? No     Health Care Directive:  Patient does not have a Health Care Directive or Living Will: Discussed advance care planning  with patient; however, patient declined at this time.    Preoperative Review of :   reviewed - no record of controlled substances prescribed.    Status of Chronic Conditions:  CAD - Patient has a longstanding history of moderate-severe CAD. Patient denies recent chest pain or NTG use, denies exercise induced dyspnea or PND. Last Stress test no record, EKG 3/10/22 Sinus Rhythm consistent with previous EKG    DIABETES - Patient has a longstanding history of DiabetesType Type II . Patient is being treated with diet, oral agents and exercise and denies significant side effects. Control has been fair. Complicating factors include but are not limited to: hypertension, hyperlipidemia and CAD/PVD.     HYPERLIPIDEMIA - Patient has a long history of significant Hyperlipidemia requiring medication for treatment with recent fair control. Patient reports no problems or side effects with the medication.     HYPERTENSION - Patient has longstanding history of HTN , currently denies any symptoms referable to elevated blood pressure. Specifically denies chest pain, palpitations, dyspnea, orthopnea, PND or peripheral edema. Blood pressure readings have been in normal range. Current medication regimen is as listed below. Patient denies any side effects of medication.     SLEEP PROBLEM - Patient has a longstanding history of snoring, excessive daytime somnolence and fatigue.. Patient has tried OTC medications with limited success.       Review of Systems  CONSTITUTIONAL: NEGATIVE for fever, chills, change in weight  INTEGUMENTARY/SKIN: NEGATIVE for worrisome rashes, moles or lesions  EYES: NEGATIVE for vision changes or irritation  ENT/MOUTH: NEGATIVE for ear, mouth and throat problems  RESP: NEGATIVE for significant cough or SOB  CV: NEGATIVE for chest pain, palpitations or peripheral edema  GI: NEGATIVE for nausea, abdominal pain, heartburn, or change in bowel habits  : NEGATIVE for frequency, dysuria, or  "hematuria  MUSCULOSKELETAL:POSITIVE  for arthralgias bilateral lower legs and left arm, muscle weakness bilateral lower legs and neck pain  NEURO: POSITIVE for numbness or tingling and paresthesias in bilateral lower legs, reports feeling that legs are \"very heavy\"   ENDOCRINE: NEGATIVE for temperature intolerance, skin/hair changes  HEME: NEGATIVE for bleeding problems  PSYCHIATRIC: NEGATIVE for changes in mood or affect    Patient Active Problem List    Diagnosis Date Noted     Low back pain 09/21/2021     Priority: Medium     Pain in upper limb 09/21/2021     Priority: Medium     Coronary atherosclerosis 08/26/2021     Priority: Medium     HFrEF (heart failure with reduced ejection fraction) (H) 07/27/2021     Priority: Medium     Acute ST elevation myocardial infarction (STEMI) of anterior wall (H) 07/26/2021     Priority: Medium     Dyslipidemia 07/26/2021     Priority: Medium     Dependence on nicotine from cigarettes 12/27/2020     Priority: Medium     PAD (peripheral artery disease) (H) 04/30/2020     Priority: Medium     Type 2 diabetes mellitus with diabetic peripheral angiopathy without gangrene, without long-term current use of insulin (H) 10/31/2017     Priority: Medium     Obesity 02/06/2017     Priority: Medium     Tobacco use disorder 04/01/2016     Priority: Medium     Varicose 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 12/14/2015     Priority: Medium     Contusion of bone 11/18/2014     Priority: Medium     Bee sting allergy 09/27/2013     Priority: Medium     Venous insufficiency 04/23/2013     Priority: Medium     Right leg only       Type 2 diabetes mellitus with hyperglycemia, without long-term current use of insulin (H) 08/02/2012     Priority: Medium     Hyperlipidemia LDL goal <100 08/02/2012     Priority: Medium     Hypertension goal BP (blood pressure) < 140/90 08/02/2012     Priority: Medium     Venous stasis ulcers (H) 07/26/2012     Priority: Medium     Chronic daily headache 03/24/2011 "     Priority: Medium      Past Medical History:   Diagnosis Date     Arthritis      Chronic daily headache 3/24/2011     ED (erectile dysfunction)      Hyperlipidemia LDL goal <100 8/2/2012     Hypertension goal BP (blood pressure) < 140/90 8/2/2012     Smoker 8/2/2012     Type 2 diabetes, HbA1c goal < 7% (H) 8/2/2012     Venous stasis ulcers (H) 7/26/2012     Past Surgical History:   Procedure Laterality Date     HERNIA REPAIR  1997    bilateral groin     ORTHOPEDIC SURGERY  1977    right shoulder dislocation     Current Outpatient Medications   Medication Sig Dispense Refill     aspirin 81 MG EC tablet Take 81 mg by mouth daily       blood glucose (CONTOUR NEXT TEST) test strip USE 1 STRIP IN VITRO ROUTE DAILY. 100 strip 0     blood glucose (NO BRAND SPECIFIED) test strip Use to test blood sugars 2   times daily or as directed 100 strip 3     blood glucose monitoring (NO BRAND SPECIFIED) meter device kit Use to test blood sugar 1 times daily or as directed.    Also refills on strips and lancets, refills for a year 1 kit 11     cilostazol (PLETAL) 50 MG tablet Take 1 tablet (50 mg) by mouth 2 times daily 60 tablet 3     glipiZIDE (GLUCOTROL XL) 10 MG 24 hr tablet TAKE 1 TABLET(10 MG) BY MOUTH DAILY 90 tablet 3     lisinopril (ZESTRIL) 20 MG tablet Take 1 tablet (20 mg) by mouth daily - Appointment needed for refill. 90 tablet 3     metFORMIN (GLUCOPHAGE) 1000 MG tablet +++NEED APPT+++TAKE 1 TABLET(1000 MG) BY MOUTH TWICE DAILY WITH MEALS 180 tablet 0     metoprolol succinate ER (TOPROL-XL) 25 MG 24 hr tablet Take 12.5 mg by mouth 2 times daily       nicotine (NICODERM CQ) 21 MG/24HR 24 hr patch Place 1 patch onto the skin every 24 hours 30 patch 1     prasugrel (EFFIENT) 10 MG TABS tablet Take 10 mg by mouth daily       rosuvastatin (CRESTOR) 20 MG tablet Take 20 mg by mouth daily         Allergies   Allergen Reactions     Bee Pollen      Swelling      Novocain [Procaine] Other (See Comments)     Per pt states  having panic attacks, breathing.        Social History     Tobacco Use     Smoking status: Current Every Day Smoker     Packs/day: 1.00     Types: Cigarettes     Smokeless tobacco: Never Used   Substance Use Topics     Alcohol use: Yes     Comment: 2 beers per day     Family History   Problem Relation Age of Onset     Alcohol/Drug Father      History   Drug Use No         Objective     /80   Pulse 96   Temp (!) 95.7  F (35.4  C) (Tympanic)   Resp 20   Ht 1.829 m (6')   Wt 108.3 kg (238 lb 12.8 oz)   SpO2 98%   BMI 32.39 kg/m      Physical Exam    GENERAL APPEARANCE: healthy, alert and no distress     EYES: EOMI,  PERRL     HENT: ear canals and TM's normal and nose and mouth without ulcers or lesions     NECK: no adenopathy, no asymmetry, masses, or scars and thyroid normal to palpation     RESP: lungs clear to auscultation - no rales, rhonchi or wheezes     CV: regular rates and rhythm, normal S1 S2, no S3 or S4 and no murmur, click or rub     ABDOMEN:  soft, nontender, no HSM or masses and bowel sounds normal     MS: extremities normal-, no evidence of inflammation in joints, FROM in all extremities.     SKIN: no suspicious lesions or rashes, discoloration on right lower leg.      NEURO: Normal strength and tone except left forearm muscle atrophy that he reported occurred after previous surgery, sensory exam grossly normal, mentation intact and speech normal     PSYCH: mentation appears normal. and affect normal/bright     LYMPHATICS: No cervical adenopathy    .cmp  Recent Labs   Lab Test 11/24/20  0820 10/15/20  0746 06/17/20  0823   HGB 15.1  --   --      --   --    * 132* 136   POTASSIUM 4.5 4.8 4.6   CR 0.79 0.89 0.70   A1C  --  6.9* 8.8*        Diagnostics:  Labs pending at this time.  Results will be reviewed when available.  Recent Results (from the past 24 hour(s))   HEMOGLOBIN A1C    Collection Time: 03/10/22  9:43 AM   Result Value Ref Range    Hemoglobin A1C 8.0 (H) 0.0 - 5.6 %       EKG: Sinus Rhythm consistent with previous ECG readings , unchanged from previous tracings    Revised Cardiac Risk Index (RCRI):  The patient has the following serious cardiovascular risks for perioperative complications:   - Coronary Artery Disease (MI, positive stress test, angina, Qs on EKG) = 1 point     RCRI Interpretation: 1 point: Class II (low risk - 0.9% complication rate)         Melisa Santo DNP student   Student exam observed as well as completed by provider. Agree with above note.   Signed Electronically by: MIRA Johnson  Copy of this evaluation report is provided to requesting physician.

## 2022-03-10 ENCOUNTER — OFFICE VISIT (OUTPATIENT)
Dept: FAMILY MEDICINE | Facility: CLINIC | Age: 62
End: 2022-03-10
Payer: COMMERCIAL

## 2022-03-10 VITALS
TEMPERATURE: 95.7 F | SYSTOLIC BLOOD PRESSURE: 130 MMHG | RESPIRATION RATE: 20 BRPM | OXYGEN SATURATION: 98 % | HEART RATE: 96 BPM | WEIGHT: 238.8 LBS | HEIGHT: 72 IN | BODY MASS INDEX: 32.34 KG/M2 | DIASTOLIC BLOOD PRESSURE: 80 MMHG

## 2022-03-10 DIAGNOSIS — I87.2 VENOUS INSUFFICIENCY: ICD-10-CM

## 2022-03-10 DIAGNOSIS — E87.1 HYPONATREMIA: ICD-10-CM

## 2022-03-10 DIAGNOSIS — I21.02 ST ELEVATION MYOCARDIAL INFARCTION INVOLVING LEFT ANTERIOR DESCENDING (LAD) CORONARY ARTERY (H): ICD-10-CM

## 2022-03-10 DIAGNOSIS — L97.909 VENOUS STASIS ULCER, UNSPECIFIED SITE, UNSPECIFIED ULCER STAGE, UNSPECIFIED WHETHER VARICOSE VEINS PRESENT (H): ICD-10-CM

## 2022-03-10 DIAGNOSIS — Z95.5 S/P CORONARY ARTERY STENT PLACEMENT: ICD-10-CM

## 2022-03-10 DIAGNOSIS — I50.20 HFREF (HEART FAILURE WITH REDUCED EJECTION FRACTION) (H): ICD-10-CM

## 2022-03-10 DIAGNOSIS — E11.65 TYPE 2 DIABETES MELLITUS WITH HYPERGLYCEMIA, WITHOUT LONG-TERM CURRENT USE OF INSULIN (H): ICD-10-CM

## 2022-03-10 DIAGNOSIS — I83.009 VENOUS STASIS ULCER, UNSPECIFIED SITE, UNSPECIFIED ULCER STAGE, UNSPECIFIED WHETHER VARICOSE VEINS PRESENT (H): ICD-10-CM

## 2022-03-10 DIAGNOSIS — I73.9 PAD (PERIPHERAL ARTERY DISEASE) (H): ICD-10-CM

## 2022-03-10 DIAGNOSIS — Z12.11 SCREEN FOR COLON CANCER: ICD-10-CM

## 2022-03-10 DIAGNOSIS — Z11.4 SCREENING FOR HIV (HUMAN IMMUNODEFICIENCY VIRUS): ICD-10-CM

## 2022-03-10 DIAGNOSIS — E78.5 HYPERLIPIDEMIA LDL GOAL <100: ICD-10-CM

## 2022-03-10 DIAGNOSIS — Z01.818 PREOP GENERAL PHYSICAL EXAM: Primary | ICD-10-CM

## 2022-03-10 DIAGNOSIS — I10 HYPERTENSION GOAL BP (BLOOD PRESSURE) < 140/90: ICD-10-CM

## 2022-03-10 LAB
ALBUMIN SERPL-MCNC: 4.5 G/DL (ref 3.4–5)
ALP SERPL-CCNC: 57 U/L (ref 40–150)
ALT SERPL W P-5'-P-CCNC: 41 U/L (ref 0–70)
ANION GAP SERPL CALCULATED.3IONS-SCNC: 8 MMOL/L (ref 3–14)
AST SERPL W P-5'-P-CCNC: 17 U/L (ref 0–45)
BILIRUB SERPL-MCNC: 0.5 MG/DL (ref 0.2–1.3)
BUN SERPL-MCNC: 16 MG/DL (ref 7–30)
CALCIUM SERPL-MCNC: 9.3 MG/DL (ref 8.5–10.1)
CHLORIDE BLD-SCNC: 101 MMOL/L (ref 94–109)
CO2 SERPL-SCNC: 25 MMOL/L (ref 20–32)
CREAT SERPL-MCNC: 0.92 MG/DL (ref 0.66–1.25)
GFR SERPL CREATININE-BSD FRML MDRD: >90 ML/MIN/1.73M2
GLUCOSE BLD-MCNC: 181 MG/DL (ref 70–99)
HBA1C MFR BLD: 8 % (ref 0–5.6)
HIV 1+2 AB+HIV1 P24 AG SERPL QL IA: NONREACTIVE
POTASSIUM BLD-SCNC: 4.8 MMOL/L (ref 3.4–5.3)
PROT SERPL-MCNC: 8.3 G/DL (ref 6.8–8.8)
SODIUM SERPL-SCNC: 134 MMOL/L (ref 133–144)

## 2022-03-10 PROCEDURE — 83036 HEMOGLOBIN GLYCOSYLATED A1C: CPT | Performed by: NURSE PRACTITIONER

## 2022-03-10 PROCEDURE — 99214 OFFICE O/P EST MOD 30 MIN: CPT | Performed by: NURSE PRACTITIONER

## 2022-03-10 PROCEDURE — 36415 COLL VENOUS BLD VENIPUNCTURE: CPT | Performed by: NURSE PRACTITIONER

## 2022-03-10 PROCEDURE — 82043 UR ALBUMIN QUANTITATIVE: CPT | Performed by: NURSE PRACTITIONER

## 2022-03-10 PROCEDURE — 93000 ELECTROCARDIOGRAM COMPLETE: CPT | Performed by: NURSE PRACTITIONER

## 2022-03-10 PROCEDURE — 80053 COMPREHEN METABOLIC PANEL: CPT | Performed by: NURSE PRACTITIONER

## 2022-03-10 PROCEDURE — 87389 HIV-1 AG W/HIV-1&-2 AB AG IA: CPT | Performed by: NURSE PRACTITIONER

## 2022-03-10 ASSESSMENT — PAIN SCALES - GENERAL: PAINLEVEL: NO PAIN (0)

## 2022-03-10 NOTE — PATIENT INSTRUCTIONS
Follow instructions to stop your blood thinner per surgeon  Preparing for Your Surgery  Getting started  A nurse will call you to review your health history and instructions. They will give you an arrival time based on your scheduled surgery time. Please be ready to share:    Your doctor's clinic name and phone number    Your medical, surgical and anesthesia history    A list of allergies and sensitivities    A list of medicines, including herbal treatments and over-the-counter drugs    Whether the patient has a legal guardian (ask how to send us the papers in advance)  Please tell us if you're pregnant--or if there's any chance you might be pregnant. Some surgeries may injure a fetus (unborn baby), so they require a pregnancy test. Surgeries that are safe for a fetus don't always need a test, and you can choose whether to have one.   If you have a child who's having surgery, please ask for a copy of Preparing for Your Child's Surgery.    Preparing for surgery    Within 30 days of surgery: Have a pre-op exam (sometimes called an H&P, or History and Physical). This can be done at a clinic or pre-operative center.  ? If you're having a , you may not need this exam. Talk to your care team.    At your pre-op exam, talk to your care team about all medicines you take. If you need to stop any medicines before surgery, ask when to start taking them again.  ? We do this for your safety. Many medicines can make you bleed too much during surgery. Some change how well surgery (anesthesia) drugs work.    Call your insurance company to let them know you're having surgery. (If you don't have insurance, call [phone].)    Call your clinic if there's any change in your health. This includes signs of a cold or flu (sore throat, runny nose, cough, rash, fever). It also includes a scrape or scratch near the surgery site.    If you have questions on the day of surgery, call your hospital or surgery center.  COVID  testing  You may need to be tested for COVID-19 before having surgery. If so, your surgical team will give you instructions for scheduling this test, separate from your preoperative history and physical.  Eating and drinking guidelines  For your safety: Unless your surgeon tells you otherwise, follow the guidelines below.    Eat and drink as usual until 8 hours before surgery. After that, no food or milk.    Drink clear liquids until 2 hours before surgery. These are liquids you can see through, like water, Gatorade and Propel Water. You may also have black coffee and tea (no cream or milk).    Nothing by mouth within 2 hours of surgery. This includes gum, candy and breath mints.    If you drink alcohol: Stop drinking it the night before surgery.    If your care team tells you to take medicine on the morning of surgery, it's okay to take it with a sip of water.  Preventing infection    Shower or bathe the night before and morning of your surgery. Follow the instructions your clinic gave you. (If no instructions, use regular soap.)    Don't shave or clip hair near your surgery site. We'll remove the hair if needed.    Don't smoke or vape the morning of surgery. You may chew nicotine gum up to 2 hours before surgery. A nicotine patch is okay.  ? Note: Some surgeries require you to completely quit smoking and nicotine. Check with your surgeon.    Your care team will make every effort to keep you safe from infection. We will:  ? Clean our hands often with soap and water (or an alcohol-based hand rub).  ? Clean the skin at your surgery site with a special soap that kills germs.  ? Give you a special gown to keep you warm. (Cold raises the risk of infection.)  ? Wear special hair covers, masks, gowns and gloves during surgery.  ? Give antibiotic medicine, if prescribed. Not all surgeries need antibiotics.  What to bring on the day of surgery    Photo ID and insurance card    Copy of your health care directive, if you have  one    Glasses and hearing aides (bring cases)  ? You can't wear contacts during surgery    Inhaler and eye drops, if you use them (tell us about these when you arrive)    CPAP machine or breathing device, if you use them    A few personal items, if spending the night    If you have . . .  ? A pacemaker, ICD (cardiac defibrillator) or other implant: Bring the ID card.  ? An implanted stimulator: Bring the remote control.  ? A legal guardian: Bring a copy of the certified (court-stamped) guardianship papers.  Please remove any jewelry, including body piercings. Leave jewelry and other valuables at home.  If you're going home the day of surgery    You must have a responsible adult drive you home. They should stay with you overnight as well.    If you don't have someone to stay with you, and you aren't safe to go home alone, we may keep you overnight. Insurance often won't pay for this.  After surgery  If it's hard to control your pain or you need more pain medicine, please call your surgeon's office.  Questions?   If you have any questions for your care team, list them here: _________________________________________________________________________________________________________________________________________________________________________ ____________________________________ ____________________________________ ____________________________________  For informational purposes only. Not to replace the advice of your health care provider. Copyright   2003, 2019 [employer]. All rights reserved. Clinically reviewed by Adelaida Teague MD. Kwic[employer] [id number] - REV 07/21.

## 2022-03-11 LAB
CREAT UR-MCNC: 157 MG/DL
MICROALBUMIN UR-MCNC: 10 MG/L
MICROALBUMIN/CREAT UR: 6.37 MG/G CR (ref 0–17)

## 2022-03-11 NOTE — RESULT ENCOUNTER NOTE
Karan Sharma,    Thank you for your recent office visit.    Here are your recent results.  Your labs show that we could improve your diabetes control. Do you want to add another diabetic medication, or work on making better diet choices; exercise when you are able to?    Feel free to contact me via [employer] or call the clinic at [phone].    Sincerely,    BALDOMERO Escalante, FNP-BC

## 2022-05-30 DIAGNOSIS — E11.65 TYPE 2 DIABETES MELLITUS WITH HYPERGLYCEMIA, WITHOUT LONG-TERM CURRENT USE OF INSULIN (H): ICD-10-CM

## 2022-06-01 NOTE — TELEPHONE ENCOUNTER
Routing refill request to provider for review/approval because:  Breana given x1 and patient did not follow up, please advise      Jada Burnett RN

## 2022-06-06 DIAGNOSIS — R07.9 CHEST PAIN, UNSPECIFIED TYPE: Primary | ICD-10-CM

## 2022-06-06 RX ORDER — NITROGLYCERIN 0.4 MG/1
TABLET SUBLINGUAL
Qty: 25 TABLET | Refills: 1 | Status: SHIPPED | OUTPATIENT
Start: 2022-06-06

## 2022-06-06 NOTE — TELEPHONE ENCOUNTER
Routing refill request to provider for review/approval because:  Drug not active on patient's medication list    Last office visit: 3/10/2022 with prescribing provider:  Ann Vizcaino NP     Patient states he has never needed to use Nitrostat. However, it was prescribed for him when he had a heart attack last year. Patient states he left the bottle in his pocket & it went thru the laundry.     Jeri Oscar RN BSN  [facility]

## 2022-06-06 NOTE — TELEPHONE ENCOUNTER
Patient notified and voiced understanding and agreement.    Jeri Oscar RN BSN  [facility]

## 2022-06-14 DIAGNOSIS — E11.65 TYPE 2 DIABETES MELLITUS WITH HYPERGLYCEMIA, WITHOUT LONG-TERM CURRENT USE OF INSULIN (H): ICD-10-CM

## 2022-06-26 ENCOUNTER — HEALTH MAINTENANCE LETTER (OUTPATIENT)
Age: 62
End: 2022-06-26

## 2022-06-29 DIAGNOSIS — I10 HYPERTENSION GOAL BP (BLOOD PRESSURE) < 140/90: ICD-10-CM

## 2022-06-29 DIAGNOSIS — E11.65 TYPE 2 DIABETES MELLITUS WITH HYPERGLYCEMIA, WITHOUT LONG-TERM CURRENT USE OF INSULIN (H): ICD-10-CM

## 2022-06-29 RX ORDER — GLIPIZIDE 10 MG/1
TABLET, FILM COATED, EXTENDED RELEASE ORAL
Qty: 90 TABLET | Refills: 3 | OUTPATIENT
Start: 2022-06-29

## 2022-06-29 RX ORDER — LISINOPRIL 20 MG/1
TABLET ORAL
Qty: 90 TABLET | Refills: 3 | OUTPATIENT
Start: 2022-06-29

## 2022-06-29 NOTE — TELEPHONE ENCOUNTER
glipiZIDE (GLUCOTROL XL) 10 MG 24 hr tablet  Last Written Prescription Date:  7/9/21  Last Fill Quantity: 90,  # refills: 3    lisinopril (ZESTRIL) 20 MG tablet  Last Written Prescription Date:  7/12/21  Last Fill Quantity: 90,  # refills: 3    LOV 10/28/20.    Request denied - patient needs appointment.

## 2022-08-15 NOTE — PROGRESS NOTES
SUBJECTIVE:   CC: Zachary Chand is an 62 year old male who presents for preventative health visit.       Patient has been advised of split billing requirements and indicates understanding: Yes  Healthy Habits:     Getting at least 3 servings of Calcium per day:  NO    Bi-annual eye exam:  NO    Dental care twice a year:  NO    Sleep apnea or symptoms of sleep apnea:  Daytime drowsiness, Excessive snoring and Sleep apnea    Diet:  Regular (no restrictions)    Frequency of exercise:  1 day/week    Duration of exercise:  45-60 minutes    Taking medications regularly:  Yes    Medication side effects:  None    PHQ-2 Total Score: 0    Additional concerns today:  No    Recheck of his dm and htn and hyperlipidemia.  Not checking sugars or blood pressure at home.   No chest pain/sob/palpitations/dizziness/ha's  No neuropathy symptoms .   No vision changes.   Today's PHQ-2 Score:   PHQ-2 ( 1999 [employer]) 8/16/2022   Q1: Little interest or pleasure in doing things 0   Q2: Feeling down, depressed or hopeless 0   PHQ-2 Score 0   PHQ-2 Total Score (12-17 Years)- Positive if 3 or more points; Administer PHQ-A if positive -   Q1: Little interest or pleasure in doing things Nearly every day   Q2: Feeling down, depressed or hopeless Not at all   PHQ-2 Score 3       Abuse: Current or Past(Physical, Sexual or Emotional)- No  Do you feel safe in your environment? Yes        Social History     Tobacco Use     Smoking status: Former Smoker     Packs/day: 1.00     Types: Cigarettes     Smokeless tobacco: Never Used   Substance Use Topics     Alcohol use: Yes     Comment: 2 beers per day         Alcohol Use 8/16/2022   Prescreen: >3 drinks/day or >7 drinks/week? No       Last PSA:   PSA   Date Value Ref Range Status   03/15/2019 2.38 0 - 4 ug/L Final     Comment:     Assay Method:  Chemiluminescence using Siemens Vista analyzer       Reviewed orders with patient. Reviewed health maintenance and updated orders accordingly - Yes  Lab work is  in process  Labs reviewed in EPIC  BP Readings from Last 3 Encounters:   08/16/22 138/72   03/10/22 130/80   09/21/21 108/60    Wt Readings from Last 3 Encounters:   08/16/22 108.8 kg (239 lb 12.8 oz)   03/10/22 108.3 kg (238 lb 12.8 oz)   09/21/21 107.7 kg (237 lb 8 oz)                  Patient Active Problem List   Diagnosis     Chronic daily headache     Venous stasis ulcers (H)     Type 2 diabetes mellitus with hyperglycemia, without long-term current use of insulin (H)     Hyperlipidemia LDL goal <100     Hypertension goal BP (blood pressure) < 140/90     Venous insufficiency     Bee sting allergy     Contusion of bone     Varicose 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     Tobacco use disorder     Obesity     Type 2 diabetes mellitus with diabetic peripheral angiopathy without gangrene, without long-term current use of insulin (H)     PAD (peripheral artery disease) (H)     Acute ST elevation myocardial infarction (STEMI) of anterior wall (H)     Coronary atherosclerosis     Dependence on nicotine from cigarettes     Dyslipidemia     HFrEF (heart failure with reduced ejection fraction) (H)     Low back pain     Pain in upper limb     Past Surgical History:   Procedure Laterality Date     HERNIA REPAIR  1997    bilateral groin     ORTHOPEDIC SURGERY  1977    right shoulder dislocation       Social History     Tobacco Use     Smoking status: Former Smoker     Packs/day: 1.00     Types: Cigarettes     Smokeless tobacco: Never Used   Substance Use Topics     Alcohol use: Yes     Comment: 2 beers per day     Family History   Problem Relation Age of Onset     Alcohol/Drug Father          Current Outpatient Medications   Medication Sig Dispense Refill     aspirin 81 MG EC tablet Take 81 mg by mouth daily       cilostazol (PLETAL) 50 MG tablet Take 1 tablet (50 mg) by mouth 2 times daily 60 tablet 3     glipiZIDE (GLUCOTROL XL) 10 MG 24 hr tablet Take 2 tablets (20 mg) by mouth daily 180 tablet 0     lisinopril  (ZESTRIL) 20 MG tablet Take 1 tablet (20 mg) by mouth daily - Appointment needed for refill. 90 tablet 1     metFORMIN (GLUCOPHAGE) 1000 MG tablet TAKE 1 TABLET BY MOUTH TWICE DAILY WITH MEALS. 180 tablet 0     metoprolol succinate ER (TOPROL-XL) 25 MG 24 hr tablet Take 12.5 mg by mouth 2 times daily       Multiple Vitamin (MULTIVITAMIN ADULT PO)        prasugrel (EFFIENT) 10 MG TABS tablet Take 10 mg by mouth daily       blood glucose (CONTOUR NEXT TEST) test strip USE 1 STRIP IN VITRO ROUTE DAILY. 100 strip 0     blood glucose (NO BRAND SPECIFIED) test strip Use to test blood sugars 2   times daily or as directed 100 strip 3     blood glucose monitoring (NO BRAND SPECIFIED) meter device kit Use to test blood sugar 1 times daily or as directed.    Also refills on strips and lancets, refills for a year 1 kit 11     nitroGLYcerin (NITROSTAT) 0.4 MG sublingual tablet For chest pain place 1 tablet under the tongue every 5 minutes for 3 doses. If symptoms persist 5 minutes after 1st dose call 911. 25 tablet 1     rosuvastatin (CRESTOR) 20 MG tablet Take 20 mg by mouth daily (Patient not taking: Reported on 8/16/2022)       Allergies   Allergen Reactions     Bee Pollen      Swelling      Novocain [Procaine] Other (See Comments)     Per pt states having panic attacks, breathing.     Recent Labs   Lab Test 08/16/22  0752 03/10/22  0943 07/28/21  0546 11/24/20  0820 10/15/20  0746 06/17/20  0823 03/13/20  1448 03/15/19  0736 11/18/16  0945 12/14/15  0719   A1C 7.6* 8.0*  --   --  6.9* 8.8*   < > 7.0*   < > 10.7*   LDL  --   --   --   --  101* 119*  --  119*   < >  --    HDL  --   --   --   --  42 36*  --  37*   < >  --    TRIG  --   --  225*  --  136 127  --  124   < >  --    ALT  --  41  --   --  36 27  --   --    < > 45   CR  --  0.92  --  0.79 0.89 0.70   < > 0.87   < > 0.78   GFRESTIMATED  --  >90  --  >90 >90 >90   < > >90   < > >90  Non  GFR Calc     GFRESTBLACK  --   --   --  >90 >90 >90   < > >90   <  > >90   GFR Calc     POTASSIUM  --  4.8  --  4.5 4.8 4.6   < > 4.5   < > 4.2   TSH  --   --   --   --   --  2.66  --   --   --  1.50    < > = values in this interval not displayed.        Reviewed and updated as needed this visit by clinical staff   Tobacco  Allergies  Meds   Med Hx  Surg Hx  Fam Hx  Soc Hx          Reviewed and updated as needed this visit by Provider                   Past Medical History:   Diagnosis Date     Arthritis      Chronic daily headache 3/24/2011     ED (erectile dysfunction)      Hyperlipidemia LDL goal <100 8/2/2012     Hypertension goal BP (blood pressure) < 140/90 8/2/2012     Smoker 8/2/2012     Type 2 diabetes, HbA1c goal < 7% (H) 8/2/2012     Venous stasis ulcers (H) 7/26/2012      Past Surgical History:   Procedure Laterality Date     HERNIA REPAIR  1997    bilateral groin     ORTHOPEDIC SURGERY  1977    right shoulder dislocation       Review of Systems   Constitutional: Negative for chills and fever.   HENT: Positive for hearing loss. Negative for congestion, ear pain and sore throat.    Eyes: Negative for pain and visual disturbance.   Respiratory: Negative for cough and shortness of breath.    Cardiovascular: Positive for peripheral edema. Negative for chest pain and palpitations.   Gastrointestinal: Negative for abdominal pain, constipation, diarrhea, heartburn and hematochezia.   Genitourinary: Negative for dysuria, frequency, genital sores, hematuria and urgency.   Musculoskeletal: Positive for arthralgias and myalgias. Negative for joint swelling.   Skin: Negative for rash.   Neurological: Positive for weakness and paresthesias. Negative for dizziness and headaches.   Psychiatric/Behavioral: Negative for mood changes. The patient is not nervous/anxious.      CONSTITUTIONAL: NEGATIVE for fever, chills, change in weight  INTEGUMENTARY/SKIN: NEGATIVE for worrisome rashes, moles or lesions  EYES: NEGATIVE for vision changes or irritation  ENT: NEGATIVE  "for ear, mouth and throat problems  RESP: NEGATIVE for significant cough or SOB  CV: NEGATIVE for chest pain, palpitations or peripheral edema  GI: NEGATIVE for nausea, abdominal pain, heartburn, or change in bowel habits   male: negative for dysuria, hematuria, decreased urinary stream, erectile dysfunction, urethral discharge  MUSCULOSKELETAL: NEGATIVE for significant arthralgias or myalgia  NEURO: NEGATIVE for weakness, dizziness or paresthesias  PSYCHIATRIC: NEGATIVE for changes in mood or affect    Diabetic foot exam: normal DP and PT pulses, no trophic changes or ulcerative lesions, normal sensory exam and normal monofilament exam    OBJECTIVE:   /72   Pulse 93   Temp 97.5  F (36.4  C) (Tympanic)   Resp 20   Ht 1.835 m (6' 0.25\")   Wt 108.8 kg (239 lb 12.8 oz)   SpO2 96%   BMI 32.30 kg/m      Physical Exam  GENERAL: healthy, alert and no distress  EYES: Eyes grossly normal to inspection, PERRL and conjunctivae and sclerae normal  HENT: ear canals and TM's normal, nose and mouth without ulcers or lesions  NECK: no adenopathy, no asymmetry, masses, or scars and thyroid normal to palpation  RESP: lungs clear to auscultation - no rales, rhonchi or wheezes  CV: regular rate and rhythm, normal S1 S2, no S3 or S4, no murmur, click or rub, no peripheral edema and peripheral pulses strong  ABDOMEN: soft, nontender, no hepatosplenomegaly, no masses and bowel sounds normal  MS: no gross musculoskeletal defects noted, no edema  SKIN: no suspicious lesions or rashes  NEURO: Normal strength and tone, mentation intact and speech normal  PSYCH: mentation appears normal, affect normal/bright    Diagnostic Test Results:  Labs reviewed in Epic    ASSESSMENT/PLAN:       ICD-10-CM    1. Routine general medical examination at a health care facility  Z00.00    2. Screen for colon cancer  Z12.11 Colonscopy Screening  Referral   3. Hypertension goal BP (blood pressure) < 140/90  I10 lisinopril (ZESTRIL) 20 MG " "tablet     Basic metabolic panel  (Ca, Cl, CO2, Creat, Gluc, K, Na, BUN)     Basic metabolic panel  (Ca, Cl, CO2, Creat, Gluc, K, Na, BUN)   4. Type 2 diabetes mellitus with hyperglycemia, without long-term current use of insulin (H)  E11.65 HEMOGLOBIN A1C     FOOT EXAM     Basic metabolic panel  (Ca, Cl, CO2, Creat, Gluc, K, Na, BUN)     Basic metabolic panel  (Ca, Cl, CO2, Creat, Gluc, K, Na, BUN)     HEMOGLOBIN A1C     glipiZIDE (GLUCOTROL XL) 10 MG 24 hr tablet     metFORMIN (GLUCOPHAGE) 1000 MG tablet     AMB Adult Diabetes Educator Referral   5. Hyperlipidemia LDL goal <100  E78.5 Lipid panel reflex to direct LDL Non-fasting     Lipid panel reflex to direct LDL Non-fasting   6. Screening for prostate cancer  Z12.5 PSA, screen     PSA, screen     Inc glipizide from 10 to 20 mg daily.   work on lifestyle modification  Recheck in 3-4 mos   Patient has been advised of split billing requirements and indicates understanding: Yes    COUNSELING:   Reviewed preventive health counseling, as reflected in patient instructions       Regular exercise       Healthy diet/nutrition    Estimated body mass index is 32.3 kg/m  as calculated from the following:    Height as of this encounter: 1.835 m (6' 0.25\").    Weight as of this encounter: 108.8 kg (239 lb 12.8 oz).     Weight management plan: Discussed healthy diet and exercise guidelines    He reports that he has quit smoking. His smoking use included cigarettes. He smoked 1.00 pack per day. He has never used smokeless tobacco.      Counseling Resources:  ATP IV Guidelines  Pooled Cohorts Equation Calculator  FRAX Risk Assessment  ICSI Preventive Guidelines  Dietary Guidelines for Americans, 2010  USDA's MyPlate  ASA Prophylaxis  Lung CA Screening    ALICE [facility] JENNIFER  Answers for HPI/ROS submitted by the patient on 8/16/2022  If you checked off any problems, how difficult have these problems made it for you to do your work, take care of " things at home, or get along with other people?: Not difficult at all  PHQ9 TOTAL SCORE: 3

## 2022-08-16 ENCOUNTER — OFFICE VISIT (OUTPATIENT)
Dept: FAMILY MEDICINE | Facility: CLINIC | Age: 62
End: 2022-08-16
Payer: COMMERCIAL

## 2022-08-16 VITALS
HEART RATE: 93 BPM | DIASTOLIC BLOOD PRESSURE: 72 MMHG | RESPIRATION RATE: 20 BRPM | WEIGHT: 239.8 LBS | TEMPERATURE: 97.5 F | BODY MASS INDEX: 32.48 KG/M2 | HEIGHT: 72 IN | SYSTOLIC BLOOD PRESSURE: 138 MMHG | OXYGEN SATURATION: 96 %

## 2022-08-16 DIAGNOSIS — Z00.00 ROUTINE GENERAL MEDICAL EXAMINATION AT A HEALTH CARE FACILITY: Primary | ICD-10-CM

## 2022-08-16 DIAGNOSIS — Z12.5 SCREENING FOR PROSTATE CANCER: ICD-10-CM

## 2022-08-16 DIAGNOSIS — E11.65 TYPE 2 DIABETES MELLITUS WITH HYPERGLYCEMIA, WITHOUT LONG-TERM CURRENT USE OF INSULIN (H): ICD-10-CM

## 2022-08-16 DIAGNOSIS — Z12.11 SCREEN FOR COLON CANCER: ICD-10-CM

## 2022-08-16 DIAGNOSIS — I10 HYPERTENSION GOAL BP (BLOOD PRESSURE) < 140/90: ICD-10-CM

## 2022-08-16 DIAGNOSIS — E78.5 HYPERLIPIDEMIA LDL GOAL <100: ICD-10-CM

## 2022-08-16 LAB
ANION GAP SERPL CALCULATED.3IONS-SCNC: 5 MMOL/L (ref 3–14)
BUN SERPL-MCNC: 14 MG/DL (ref 7–30)
CALCIUM SERPL-MCNC: 9.3 MG/DL (ref 8.5–10.1)
CHLORIDE BLD-SCNC: 104 MMOL/L (ref 94–109)
CHOLEST SERPL-MCNC: 151 MG/DL
CO2 SERPL-SCNC: 29 MMOL/L (ref 20–32)
CREAT SERPL-MCNC: 0.85 MG/DL (ref 0.66–1.25)
FASTING STATUS PATIENT QL REPORTED: YES
GFR SERPL CREATININE-BSD FRML MDRD: >90 ML/MIN/1.73M2
GLUCOSE BLD-MCNC: 212 MG/DL (ref 70–99)
HBA1C MFR BLD: 7.6 % (ref 0–5.6)
HDLC SERPL-MCNC: 41 MG/DL
LDLC SERPL CALC-MCNC: 77 MG/DL
NONHDLC SERPL-MCNC: 110 MG/DL
POTASSIUM BLD-SCNC: 4.7 MMOL/L (ref 3.4–5.3)
PSA SERPL-MCNC: 2.81 UG/L (ref 0–4)
SODIUM SERPL-SCNC: 138 MMOL/L (ref 133–144)
TRIGL SERPL-MCNC: 166 MG/DL

## 2022-08-16 PROCEDURE — 83036 HEMOGLOBIN GLYCOSYLATED A1C: CPT | Performed by: PHYSICIAN ASSISTANT

## 2022-08-16 PROCEDURE — G0103 PSA SCREENING: HCPCS | Performed by: PHYSICIAN ASSISTANT

## 2022-08-16 PROCEDURE — 80061 LIPID PANEL: CPT | Performed by: PHYSICIAN ASSISTANT

## 2022-08-16 PROCEDURE — 99214 OFFICE O/P EST MOD 30 MIN: CPT | Mod: 25 | Performed by: PHYSICIAN ASSISTANT

## 2022-08-16 PROCEDURE — 99396 PREV VISIT EST AGE 40-64: CPT | Performed by: PHYSICIAN ASSISTANT

## 2022-08-16 PROCEDURE — 36415 COLL VENOUS BLD VENIPUNCTURE: CPT | Performed by: PHYSICIAN ASSISTANT

## 2022-08-16 PROCEDURE — 99207 PR FOOT EXAM NO CHARGE: CPT | Mod: 25 | Performed by: PHYSICIAN ASSISTANT

## 2022-08-16 PROCEDURE — 80048 BASIC METABOLIC PNL TOTAL CA: CPT | Performed by: PHYSICIAN ASSISTANT

## 2022-08-16 RX ORDER — GLIPIZIDE 10 MG/1
20 TABLET, FILM COATED, EXTENDED RELEASE ORAL DAILY
Qty: 180 TABLET | Refills: 0 | Status: SHIPPED | OUTPATIENT
Start: 2022-08-16 | End: 2022-11-22

## 2022-08-16 RX ORDER — LISINOPRIL 20 MG/1
20 TABLET ORAL DAILY
Qty: 90 TABLET | Refills: 1 | Status: SHIPPED | OUTPATIENT
Start: 2022-08-16 | End: 2022-11-22

## 2022-08-16 ASSESSMENT — ENCOUNTER SYMPTOMS
DYSURIA: 0
WEAKNESS: 1
COUGH: 0
PALPITATIONS: 0
DIARRHEA: 0
EYE PAIN: 0
HEADACHES: 0
SORE THROAT: 0
PARESTHESIAS: 1
DIZZINESS: 0
HEMATOCHEZIA: 0
SHORTNESS OF BREATH: 0
JOINT SWELLING: 0
CHILLS: 0
CONSTIPATION: 0
FREQUENCY: 0
NERVOUS/ANXIOUS: 0
HEMATURIA: 0
ABDOMINAL PAIN: 0
FEVER: 0
ARTHRALGIAS: 1
MYALGIAS: 1
HEARTBURN: 0

## 2022-08-16 ASSESSMENT — PATIENT HEALTH QUESTIONNAIRE - PHQ9
SUM OF ALL RESPONSES TO PHQ QUESTIONS 1-9: 3
10. IF YOU CHECKED OFF ANY PROBLEMS, HOW DIFFICULT HAVE THESE PROBLEMS MADE IT FOR YOU TO DO YOUR WORK, TAKE CARE OF THINGS AT HOME, OR GET ALONG WITH OTHER PEOPLE: NOT DIFFICULT AT ALL
SUM OF ALL RESPONSES TO PHQ QUESTIONS 1-9: 3

## 2022-08-16 ASSESSMENT — PAIN SCALES - GENERAL: PAINLEVEL: MODERATE PAIN (5)

## 2022-08-28 DIAGNOSIS — E11.65 TYPE 2 DIABETES MELLITUS WITH HYPERGLYCEMIA, WITHOUT LONG-TERM CURRENT USE OF INSULIN (H): ICD-10-CM

## 2022-09-01 ENCOUNTER — TELEPHONE (OUTPATIENT)
Dept: EDUCATION SERVICES | Facility: CLINIC | Age: 62
End: 2022-09-01

## 2022-09-01 NOTE — TELEPHONE ENCOUNTER
Called patient for scheduled Diabetes Education visit.  Patient had forgotten about the appointment and was just leaving, didn't have time for the visit today.  Offered to reschedule, but patient stated he would just need to call in and schedule.  Gave him the scheduling number to call [phone].    Lisy Figueredo RD, [facility], 9:41 AM, 9/1/2022

## 2022-11-18 NOTE — PROGRESS NOTES
"      Lisa Sharma is a 62 year oldpresenting for the following health issues:  Diabetes, Hypertension, Heart Failure, and Health Maintenance (Patient declines influenza, pneumonia and tdap vaccines)      History of Present Illness       Diabetes:   He presents for follow up of diabetes.  He is checking home blood glucose a few times a month. He checks blood glucose before and after meals.  Blood glucose is sometimes over 200 and never under 70. He is aware of hypoglycemia symptoms including blurred vision. He is concerned about blood sugar frequently over 200.  He is having numbness in feet and burning in feet. The patient has not had a diabetic eye exam in the last 12 months.         Heart Failure:  He presents for follow up of heart failure. He is not experiencing shortness of breath at night, with rest or with activity He is experiencing lower extremity edema which is same as usual. He denies orthopenea and is not coughing at night. Patient is not checking weight daily. He has recently had a weight increase. He has no side effects from medications.  He has had no other medical visits for heart failure since the last visit.    Hypertension: He presents for follow up of hypertension.  He does not check blood pressure  regularly outside of the clinic. Outside blood pressures have been over 140/90. He follows a low salt diet.         Plans to start working out again through the winter.  AM sugars running in the upper 100 range.     History of neuropathy. Burning and numbness.   No chest pain/sob/palpitations/dizziness/ha's  No vision symptoms.     History of coronary stent and stents in his right leg.     Review of Systems   Constitutional, HEENT, cardiovascular, pulmonary, GI, , musculoskeletal, neuro, skin, endocrine and psych systems are negative, except as otherwise noted.      Objective    /82   Pulse 105   Temp 97.2  F (36.2  C) (Tympanic)   Resp 20   Ht 1.867 m (6' 1.5\")   Wt 109.3 kg (241 " lb)   SpO2 95%   BMI 31.36 kg/m    Body mass index is 31.36 kg/m .  Physical Exam   Eye exam - right eye normal lid, conjunctiva, cornea, pupil and fundus, left eye normal lid, conjunctiva, cornea, pupil and fundus.  Thyroid not palpable, not enlarged, no nodules detected.  CHEST:chest clear to IPPA, no tachypnea, retractions or cyanosis and S1, S2 normal, no murmur, no gallop, rate regular.  Examination of the feet reveals normal DP and PT pulses, no trophic changes or ulcerative lesions and reduced sensation of both feet. .    Zachary was seen today for diabetes, hypertension, heart failure and health maintenance.    Diagnoses and all orders for this visit:    Type 2 diabetes mellitus with diabetic peripheral angiopathy without gangrene, without long-term current use of insulin (H)  -     Adult Eye  Referral; Future  -     HEMOGLOBIN A1C; Future  -     HEMOGLOBIN A1C    Screen for colon cancer  -     Colonoscopy Screening  Referral; Future    Type 2 diabetes mellitus with hyperglycemia, without long-term current use of insulin (H)  -     metFORMIN (GLUCOPHAGE) 1000 MG tablet; TAKE 1 TABLET BY MOUTH TWICE DAILY WITH MEALS.  -     glipiZIDE (GLUCOTROL XL) 10 MG 24 hr tablet; Take 2 tablets (20 mg) by mouth daily  -     empagliflozin (JARDIANCE) 25 MG TABS tablet; Take 1 tablet (25 mg) by mouth daily    Hypertension goal BP (blood pressure) < 140/90  -     lisinopril (ZESTRIL) 40 MG tablet; Take 1 tablet (40 mg) by mouth daily - Appointment needed for refill.    Diabetic polyneuropathy associated with type 2 diabetes mellitus (H)  -     L-Methylfolate-B6-B12 3-35-2 MG TABS; 1 tab twice a day for 4 week, then take 1 tab daily thereafter    Hyperlipidemia LDL goal <70  -     rosuvastatin (CRESTOR) 20 MG tablet; Take 1 tablet (20 mg) by mouth daily    PAD (peripheral artery disease) (H)  -     rosuvastatin (CRESTOR) 20 MG tablet; Take 1 tablet (20 mg) by mouth daily      work on lifestyle  modification  Start L methylfolate to help with his neuropathy.  Inc lisinopril from 20 to 40 mg daily.  Add in jardiance.   Recheck in 3 mos

## 2022-11-22 ENCOUNTER — OFFICE VISIT (OUTPATIENT)
Dept: FAMILY MEDICINE | Facility: CLINIC | Age: 62
End: 2022-11-22
Payer: COMMERCIAL

## 2022-11-22 VITALS
DIASTOLIC BLOOD PRESSURE: 82 MMHG | HEIGHT: 74 IN | RESPIRATION RATE: 20 BRPM | WEIGHT: 241 LBS | BODY MASS INDEX: 30.93 KG/M2 | HEART RATE: 105 BPM | TEMPERATURE: 97.2 F | OXYGEN SATURATION: 95 % | SYSTOLIC BLOOD PRESSURE: 138 MMHG

## 2022-11-22 DIAGNOSIS — I10 HYPERTENSION GOAL BP (BLOOD PRESSURE) < 140/90: ICD-10-CM

## 2022-11-22 DIAGNOSIS — E11.65 TYPE 2 DIABETES MELLITUS WITH HYPERGLYCEMIA, WITHOUT LONG-TERM CURRENT USE OF INSULIN (H): ICD-10-CM

## 2022-11-22 DIAGNOSIS — I73.9 PAD (PERIPHERAL ARTERY DISEASE) (H): ICD-10-CM

## 2022-11-22 DIAGNOSIS — E11.42 DIABETIC POLYNEUROPATHY ASSOCIATED WITH TYPE 2 DIABETES MELLITUS (H): ICD-10-CM

## 2022-11-22 DIAGNOSIS — Z12.11 SCREEN FOR COLON CANCER: ICD-10-CM

## 2022-11-22 DIAGNOSIS — E11.51 TYPE 2 DIABETES MELLITUS WITH DIABETIC PERIPHERAL ANGIOPATHY WITHOUT GANGRENE, WITHOUT LONG-TERM CURRENT USE OF INSULIN (H): Primary | ICD-10-CM

## 2022-11-22 DIAGNOSIS — E78.5 HYPERLIPIDEMIA LDL GOAL <70: ICD-10-CM

## 2022-11-22 LAB — HBA1C MFR BLD: 9.1 % (ref 0–5.6)

## 2022-11-22 PROCEDURE — 83036 HEMOGLOBIN GLYCOSYLATED A1C: CPT | Performed by: PHYSICIAN ASSISTANT

## 2022-11-22 PROCEDURE — 36415 COLL VENOUS BLD VENIPUNCTURE: CPT | Performed by: PHYSICIAN ASSISTANT

## 2022-11-22 PROCEDURE — 99214 OFFICE O/P EST MOD 30 MIN: CPT | Performed by: PHYSICIAN ASSISTANT

## 2022-11-22 RX ORDER — GLIPIZIDE 10 MG/1
20 TABLET, FILM COATED, EXTENDED RELEASE ORAL DAILY
Qty: 180 TABLET | Refills: 0 | Status: SHIPPED | OUTPATIENT
Start: 2022-11-22 | End: 2023-02-20

## 2022-11-22 RX ORDER — MECOBAL/LEVOMEFOLAT CA/B6 PHOS 2-3-35 MG
TABLET ORAL
Qty: 90 TABLET | Refills: 3 | Status: SHIPPED | OUTPATIENT
Start: 2022-11-22

## 2022-11-22 RX ORDER — ROSUVASTATIN CALCIUM 20 MG/1
20 TABLET, COATED ORAL DAILY
Qty: 90 TABLET | Refills: 3 | Status: SHIPPED | OUTPATIENT
Start: 2022-11-22 | End: 2023-10-30

## 2022-11-22 RX ORDER — LISINOPRIL 40 MG/1
40 TABLET ORAL DAILY
Qty: 90 TABLET | Refills: 0 | Status: SHIPPED | OUTPATIENT
Start: 2022-11-22 | End: 2023-02-27

## 2022-11-22 ASSESSMENT — PAIN SCALES - GENERAL: PAINLEVEL: MODERATE PAIN (5)

## 2022-12-26 ENCOUNTER — HEALTH MAINTENANCE LETTER (OUTPATIENT)
Age: 62
End: 2022-12-26

## 2023-02-20 DIAGNOSIS — E11.65 TYPE 2 DIABETES MELLITUS WITH HYPERGLYCEMIA, WITHOUT LONG-TERM CURRENT USE OF INSULIN (H): ICD-10-CM

## 2023-02-20 RX ORDER — GLIPIZIDE 10 MG/1
TABLET, FILM COATED, EXTENDED RELEASE ORAL
Qty: 180 TABLET | Refills: 0 | Status: SHIPPED | OUTPATIENT
Start: 2023-02-20 | End: 2023-04-11

## 2023-02-25 DIAGNOSIS — I10 HYPERTENSION GOAL BP (BLOOD PRESSURE) < 140/90: ICD-10-CM

## 2023-02-27 RX ORDER — LISINOPRIL 40 MG/1
TABLET ORAL
Qty: 90 TABLET | Refills: 0 | Status: SHIPPED | OUTPATIENT
Start: 2023-02-27 | End: 2023-06-08

## 2023-03-17 ENCOUNTER — TRANSFERRED RECORDS (OUTPATIENT)
Dept: HEALTH INFORMATION MANAGEMENT | Facility: CLINIC | Age: 63
End: 2023-03-17
Payer: COMMERCIAL

## 2023-03-17 LAB — RETINOPATHY: NEGATIVE

## 2023-04-16 ENCOUNTER — HEALTH MAINTENANCE LETTER (OUTPATIENT)
Age: 63
End: 2023-04-16

## 2023-04-16 DIAGNOSIS — E11.65 TYPE 2 DIABETES MELLITUS WITH HYPERGLYCEMIA, WITHOUT LONG-TERM CURRENT USE OF INSULIN (H): ICD-10-CM

## 2023-05-03 ENCOUNTER — OFFICE VISIT (OUTPATIENT)
Dept: FAMILY MEDICINE | Facility: CLINIC | Age: 63
End: 2023-05-03
Payer: COMMERCIAL

## 2023-05-03 VITALS
SYSTOLIC BLOOD PRESSURE: 112 MMHG | HEART RATE: 104 BPM | TEMPERATURE: 96.9 F | WEIGHT: 246.4 LBS | HEIGHT: 73 IN | RESPIRATION RATE: 16 BRPM | OXYGEN SATURATION: 96 % | BODY MASS INDEX: 32.66 KG/M2 | DIASTOLIC BLOOD PRESSURE: 68 MMHG

## 2023-05-03 DIAGNOSIS — Z71.89 ADVANCE CARE PLANNING: ICD-10-CM

## 2023-05-03 DIAGNOSIS — I10 HYPERTENSION GOAL BP (BLOOD PRESSURE) < 140/90: ICD-10-CM

## 2023-05-03 DIAGNOSIS — I50.20 HFREF (HEART FAILURE WITH REDUCED EJECTION FRACTION) (H): ICD-10-CM

## 2023-05-03 DIAGNOSIS — M54.50 CHRONIC BILATERAL LOW BACK PAIN WITHOUT SCIATICA: ICD-10-CM

## 2023-05-03 DIAGNOSIS — E11.42 DIABETIC POLYNEUROPATHY ASSOCIATED WITH TYPE 2 DIABETES MELLITUS (H): ICD-10-CM

## 2023-05-03 DIAGNOSIS — Z12.11 SCREEN FOR COLON CANCER: ICD-10-CM

## 2023-05-03 DIAGNOSIS — G89.29 CHRONIC BILATERAL LOW BACK PAIN WITHOUT SCIATICA: ICD-10-CM

## 2023-05-03 DIAGNOSIS — J44.9 COPD, MILD (H): ICD-10-CM

## 2023-05-03 DIAGNOSIS — E11.51 TYPE 2 DIABETES MELLITUS WITH DIABETIC PERIPHERAL ANGIOPATHY WITHOUT GANGRENE, WITHOUT LONG-TERM CURRENT USE OF INSULIN (H): Primary | ICD-10-CM

## 2023-05-03 LAB
ALBUMIN SERPL-MCNC: 4.5 G/DL (ref 3.4–5)
ALP SERPL-CCNC: 46 U/L (ref 40–150)
ALT SERPL W P-5'-P-CCNC: 43 U/L (ref 0–70)
ANION GAP SERPL CALCULATED.3IONS-SCNC: 6 MMOL/L (ref 3–14)
AST SERPL W P-5'-P-CCNC: 21 U/L (ref 0–45)
BILIRUB SERPL-MCNC: 0.3 MG/DL (ref 0.2–1.3)
BUN SERPL-MCNC: 18 MG/DL (ref 7–30)
CALCIUM SERPL-MCNC: 9.6 MG/DL (ref 8.5–10.1)
CHLORIDE BLD-SCNC: 101 MMOL/L (ref 94–109)
CO2 SERPL-SCNC: 26 MMOL/L (ref 20–32)
CREAT SERPL-MCNC: 0.99 MG/DL (ref 0.66–1.25)
GFR SERPL CREATININE-BSD FRML MDRD: 87 ML/MIN/1.73M2
GLUCOSE BLD-MCNC: 178 MG/DL (ref 70–99)
HBA1C MFR BLD: 8.4 % (ref 0–5.6)
POTASSIUM BLD-SCNC: 5.2 MMOL/L (ref 3.4–5.3)
PROT SERPL-MCNC: 8.7 G/DL (ref 6.8–8.8)
SODIUM SERPL-SCNC: 133 MMOL/L (ref 133–144)

## 2023-05-03 PROCEDURE — 36415 COLL VENOUS BLD VENIPUNCTURE: CPT | Performed by: PHYSICIAN ASSISTANT

## 2023-05-03 PROCEDURE — 82570 ASSAY OF URINE CREATININE: CPT | Performed by: PHYSICIAN ASSISTANT

## 2023-05-03 PROCEDURE — 82043 UR ALBUMIN QUANTITATIVE: CPT | Performed by: PHYSICIAN ASSISTANT

## 2023-05-03 PROCEDURE — 83036 HEMOGLOBIN GLYCOSYLATED A1C: CPT | Performed by: PHYSICIAN ASSISTANT

## 2023-05-03 PROCEDURE — 80053 COMPREHEN METABOLIC PANEL: CPT | Performed by: PHYSICIAN ASSISTANT

## 2023-05-03 PROCEDURE — 99214 OFFICE O/P EST MOD 30 MIN: CPT | Performed by: PHYSICIAN ASSISTANT

## 2023-05-03 RX ORDER — OXYCODONE AND ACETAMINOPHEN 5; 325 MG/1; MG/1
1 TABLET ORAL EVERY 6 HOURS PRN
Qty: 25 TABLET | Refills: 0 | Status: SHIPPED | OUTPATIENT
Start: 2023-05-03 | End: 2024-08-14

## 2023-05-03 RX ORDER — PREGABALIN 25 MG/1
25 CAPSULE ORAL 2 TIMES DAILY
Qty: 180 CAPSULE | Refills: 0 | Status: SHIPPED | OUTPATIENT
Start: 2023-05-03 | End: 2024-01-02

## 2023-05-03 RX ORDER — MELOXICAM 15 MG/1
15 TABLET ORAL DAILY
Qty: 90 TABLET | Refills: 1 | Status: SHIPPED | OUTPATIENT
Start: 2023-05-03 | End: 2023-10-30

## 2023-05-03 ASSESSMENT — ANXIETY QUESTIONNAIRES
IF YOU CHECKED OFF ANY PROBLEMS ON THIS QUESTIONNAIRE, HOW DIFFICULT HAVE THESE PROBLEMS MADE IT FOR YOU TO DO YOUR WORK, TAKE CARE OF THINGS AT HOME, OR GET ALONG WITH OTHER PEOPLE: VERY DIFFICULT
2. NOT BEING ABLE TO STOP OR CONTROL WORRYING: SEVERAL DAYS
GAD7 TOTAL SCORE: 12
6. BECOMING EASILY ANNOYED OR IRRITABLE: NEARLY EVERY DAY
1. FEELING NERVOUS, ANXIOUS, OR ON EDGE: SEVERAL DAYS
7. FEELING AFRAID AS IF SOMETHING AWFUL MIGHT HAPPEN: NOT AT ALL
5. BEING SO RESTLESS THAT IT IS HARD TO SIT STILL: NEARLY EVERY DAY
3. WORRYING TOO MUCH ABOUT DIFFERENT THINGS: SEVERAL DAYS
GAD7 TOTAL SCORE: 12

## 2023-05-03 ASSESSMENT — PAIN SCALES - GENERAL: PAINLEVEL: SEVERE PAIN (6)

## 2023-05-03 ASSESSMENT — PATIENT HEALTH QUESTIONNAIRE - PHQ9: 5. POOR APPETITE OR OVEREATING: NEARLY EVERY DAY

## 2023-05-03 NOTE — PROGRESS NOTES
Lisa Sharma is a 62 year old, presenting for the following health issues:  Pain (Chronic pain), Diabetes, Hyperlipidemia, and Hypertension        5/3/2023     8:03 AM   Additional Questions   Roomed by Hugh BANDA   Accompanied by VJ         5/3/2023     8:03 AM   Patient Reported Additional Medications   Patient reports taking the following new medications NA     History of Present Illness       Back Pain:  He presents for follow up of back pain. Patient's back pain is a chronic problem.  Location of back pain:  Right lower back, right side of neck, left side of neck, right hip and left hip  Description of back pain: dull ache, sharp, shooting and stabbing  Back pain spreads: right buttocks, left buttocks, left thigh, right knee and left knee    Since patient first noticed back pain, pain is: always present, but gets better and worse  Does back pain interfere with his job:  Yes      Diabetes:   He presents for follow up of diabetes.  He is checking home blood glucose one time daily. He checks blood glucose before meals.  Blood glucose is sometimes over 200 and never under 70. He is aware of hypoglycemia symptoms including shakiness, dizziness and blurred vision. He is concerned about blood sugar frequently over 200.  He is having numbness in feet, burning in feet and blurry vision.         Hyperlipidemia:  He presents for follow up of hyperlipidemia.  He is taking medication to lower cholesterol. He is not having myalgia or other side effects to statin medications.    Hypertension: He presents for follow up of hypertension.  He does not check blood pressure  regularly outside of the clinic. Outpatient blood pressures have not been over 140/90. He follows a low salt diet.     He eats 2-3 servings of fruits and vegetables daily.He consumes 2 sweetened beverage(s) daily.He exercises with enough effort to increase his heart rate 30 to 60 minutes per day.  He exercises with enough effort to increase his heart  "rate 7 days per week.   He is taking medications regularly.  neuropathy still and issue.   Pt is unsure if he has been taking methylfolate.  Sugars still up and down.      Recheck of copd. Followed by pulmonology. Just starting some inhalers. Denies wheezing or cough.   Recheck of chf. No orthopnea. No chest pain/palps. Occasional SOSA. Followed by cardiology . Last EF 57%  Pain History:  When did you first notice your pain? 20+ years ago   Have you seen this provider for your pain in the past? No   Where in your body do your have pain? Lower back, neck, hips, leg  Are you seeing anyone else for your pain? Yes - Pain Clinic, chiro  What makes your pain better? NA  What makes your pain worse? Daily activities, laying down  How has pain affected your ability to work? Unable to work due to pain. 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 girlfriend    Pt would like to discuss pain medication options          8/16/2022     7:16 AM   PHQ-9 SCORE   PHQ-9 Total Score MyChart 3 (Minimal depression)   PHQ-9 Total Score 3                 8/16/2022     7:16 AM   PHQ-9 SCORE   PHQ-9 Total Score MyChart 3 (Minimal depression)   PHQ-9 Total Score 3         5/3/2023     8:18 AM   PAUL-7 SCORE   Total Score 12         5/3/2023     8:20 AM   PEG Score   PEG Total Score 8                 Review of Systems   Constitutional, HEENT, cardiovascular, pulmonary, GI, , musculoskeletal, neuro, skin, endocrine and psych systems are negative, except as otherwise noted.      Objective    /68   Pulse 104   Temp 96.9  F (36.1  C) (Temporal)   Resp 16   Ht 1.86 m (6' 1.23\")   Wt 111.8 kg (246 lb 6.4 oz)   SpO2 96%   BMI 32.31 kg/m    Body mass index is 32.31 kg/m .  Physical Exam   Eye exam - right eye normal lid, conjunctiva, cornea, pupil and fundus, left eye normal lid, conjunctiva, cornea, pupil and fundus.  Thyroid not palpable, not " enlarged, no nodules detected.  CHEST:chest clear to IPPA, no tachypnea, retractions or cyanosis and S1, S2 normal, no murmur, no gallop, rate regular.  Examination of the feet reveals normal DP and PT pulses, no trophic changes or ulcerative lesions and reduced sensation in both feet  .  BACK: Lumbosacral spine area reveals local tenderness.  Painful and reduced LS ROM noted. Straight leg raise is negative .  DTR's, motor strength and sensation normal, including heel and toe gait.  Perifpheral pulses are palpable.  Hipes and knees have full range of motion without pain.  No abdominal tenderness, mass or organomegaly.    Zachary was seen today for pain, diabetes, hyperlipidemia and hypertension.    Diagnoses and all orders for this visit:    Type 2 diabetes mellitus with diabetic peripheral angiopathy without gangrene, without long-term current use of insulin (H)  -     HEMOGLOBIN A1C; Future  -     Albumin Random Urine Quantitative with Creat Ratio; Future  -     pregabalin (LYRICA) 25 MG capsule; Take 1 capsule (25 mg) by mouth 2 times daily  -     Albumin Random Urine Quantitative with Creat Ratio  -     HEMOGLOBIN A1C  -     semaglutide (OZEMPIC) 2 MG/3ML pen; Inject 0.25 mg Subcutaneous every 7 days    Advance care planning    Screen for colon cancer  -     Colonoscopy Screening  Referral; Future    Hypertension goal BP (blood pressure) < 140/90  -     COMPREHENSIVE METABOLIC PANEL; Future  -     COMPREHENSIVE METABOLIC PANEL    COPD, mild (H)    HFrEF (heart failure with reduced ejection fraction) (H)    Chronic bilateral low back pain without sciatica  -     Spine  Referral; Future  -     oxyCODONE-acetaminophen (PERCOCET) 5-325 MG tablet; Take 1 tablet by mouth every 6 hours as needed for pain  -     meloxicam (MOBIC) 15 MG tablet; Take 1 tablet (15 mg) by mouth daily    Diabetic polyneuropathy associated with type 2 diabetes mellitus (H)  -     oxyCODONE-acetaminophen (PERCOCET) 5-325 MG  tablet; Take 1 tablet by mouth every 6 hours as needed for pain    Other orders  -     Cancel: Pneumococcal 20 Valent Conjugate (Prevnar 20)  -     Cancel: TDAP VACCINE (Adacel, Boostrix)  -     REVIEW OF HEALTH MAINTENANCE PROTOCOL ORDERS      Exercise . Lower sugar diet.  2 week trial off of crestor  Recheck in 3 mos

## 2023-05-04 ENCOUNTER — TELEPHONE (OUTPATIENT)
Dept: FAMILY MEDICINE | Facility: CLINIC | Age: 63
End: 2023-05-04

## 2023-05-04 DIAGNOSIS — E11.51 TYPE 2 DIABETES MELLITUS WITH DIABETIC PERIPHERAL ANGIOPATHY WITHOUT GANGRENE, WITHOUT LONG-TERM CURRENT USE OF INSULIN (H): Primary | ICD-10-CM

## 2023-05-04 LAB
CREAT UR-MCNC: 59 MG/DL
MICROALBUMIN UR-MCNC: 9 MG/L
MICROALBUMIN/CREAT UR: 15.25 MG/G CR (ref 0–17)

## 2023-05-04 NOTE — TELEPHONE ENCOUNTER
Central Prior Authorization Team   Phone: [phone]      PRIOR AUTHORIZATION DENIED    Medication: semaglutide (OZEMPIC) 2 MG/3ML pen - EPA DENIED    Denial Date: 5/3/2023    Denial Rational: TRIAL AND FAILURE OF TWO PREFERRED, OR HAVE CONTRAINDICATIONS TO THE PREFERRED: Examples of formulary alternatives include: BYDUREON BCISE,  BYETTA, and VICTOZA          Appeal Information:

## 2023-05-16 NOTE — TELEPHONE ENCOUNTER
I called and spoke to Zachary, [place] and won't be able to call insurance until after that.    I see a second PA request was sent for the ozempic?    I advised patient I would postpone this encounter for 1.5 weeks and we will see if the issue was resolved and/or call him to see what his insurance told him.    Looks like victoza was listed as a formulary alternative per the PA denial.    Not sure why that was not sent by provider.    Gabi Lee, RN  [facility]

## 2023-05-18 NOTE — LETTER
March 17, 2020      Zachary Chand  [address] [facility] [place] [address]        Dear ,    We are writing to inform you of your test results.    Your diabetes has significantly worsened over the last year. Please take your medication as prescribed. Follow up as needed.     Resulted Orders   Albumin Random Urine Quantitative with Creat Ratio   Result Value Ref Range    Creatinine Urine 139 mg/dL    Albumin Urine mg/L 14 mg/L    Albumin Urine mg/g Cr 10.22 0 - 17 mg/g Cr   HEMOGLOBIN A1C   Result Value Ref Range    Hemoglobin A1C 10.2 (H) 0 - 5.6 %      Comment:      Normal <5.7% Prediabetes 5.7-6.4%  Diabetes 6.5% or higher - adopted from ADA   consensus guidelines.  Results confirmed by repeat test     BASIC METABOLIC PANEL   Result Value Ref Range    Sodium 135 133 - 144 mmol/L    Potassium 3.9 3.4 - 5.3 mmol/L    Chloride 102 94 - 109 mmol/L    Carbon Dioxide 25 20 - 32 mmol/L    Anion Gap 8 3 - 14 mmol/L    Glucose 297 (H) 70 - 99 mg/dL    Urea Nitrogen 15 7 - 30 mg/dL    Creatinine 0.81 0.66 - 1.25 mg/dL    GFR Estimate >90 >60 mL/min/[1.73_m2]      Comment:      Non  GFR Calc  Starting 12/18/2018, serum creatinine based estimated GFR (eGFR) will be   calculated using the Chronic Kidney Disease Epidemiology Collaboration   (CKD-EPI) equation.      GFR Estimate If Black >90 >60 mL/min/[1.73_m2]      Comment:       GFR Calc  Starting 12/18/2018, serum creatinine based estimated GFR (eGFR) will be   calculated using the Chronic Kidney Disease Epidemiology Collaboration   (CKD-EPI) equation.      Calcium 9.1 8.5 - 10.1 mg/dL       If you have any questions or concerns, please call the clinic at the number listed above.       Sincerely,        Ann Vizcaino NP/bonnie                 PT CALLED REQUESTING LEVOTHYROXINE 25 TO BE SENT IN TO [facility] ON [address].

## 2023-05-30 NOTE — TELEPHONE ENCOUNTER
I called and spoke to patient.   He says he called insurance and insurance told him that Michael needs to send in a letter explaining why he needs to use ozempic.    I advised it looks to me like Michael was going to try victoza instead and it appears this is a preferred med on his formulary.  Patient is fine with that.    Routed to Michael Murphy to send Victoza to take the place of ozempic due to insurance issue.    Gabi Lee RN  [facility]

## 2023-06-07 DIAGNOSIS — I10 HYPERTENSION GOAL BP (BLOOD PRESSURE) < 140/90: ICD-10-CM

## 2023-06-08 RX ORDER — LISINOPRIL 40 MG/1
TABLET ORAL
Qty: 90 TABLET | Refills: 2 | Status: SHIPPED | OUTPATIENT
Start: 2023-06-08 | End: 2024-01-02

## 2023-06-08 RX ORDER — LIRAGLUTIDE 6 MG/ML
1.2 INJECTION SUBCUTANEOUS DAILY
Qty: 6 ML | Refills: 1 | Status: SHIPPED | OUTPATIENT
Start: 2023-06-08 | End: 2023-07-19

## 2023-06-08 NOTE — TELEPHONE ENCOUNTER
Prescription approved per [facility] Refill Protocol.  Sara Weiner, RN  [facility] Triage Nurse
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
## 2023-06-12 ENCOUNTER — OFFICE VISIT (OUTPATIENT)
Dept: FAMILY MEDICINE | Facility: CLINIC | Age: 63
End: 2023-06-12
Payer: COMMERCIAL

## 2023-06-12 VITALS
DIASTOLIC BLOOD PRESSURE: 66 MMHG | OXYGEN SATURATION: 98 % | WEIGHT: 240 LBS | HEART RATE: 87 BPM | TEMPERATURE: 96.7 F | HEIGHT: 73 IN | SYSTOLIC BLOOD PRESSURE: 124 MMHG | BODY MASS INDEX: 31.81 KG/M2 | RESPIRATION RATE: 14 BRPM

## 2023-06-12 DIAGNOSIS — Z87.891 PERSONAL HISTORY OF TOBACCO USE: Primary | ICD-10-CM

## 2023-06-12 PROCEDURE — 99213 OFFICE O/P EST LOW 20 MIN: CPT | Performed by: PHYSICIAN ASSISTANT

## 2023-06-12 ASSESSMENT — PAIN SCALES - GENERAL: PAINLEVEL: MODERATE PAIN (5)

## 2023-06-12 NOTE — PATIENT INSTRUCTIONS
Bubba Sharma,    Thank you for allowing [facility] to manage your care.    If you develop worsening/changing symptoms at any time, please call 911 or go to the emergency department for evaluation.    I ordered a CT scan for 6 months from now. Please call diagnostic imaging [phone] to schedule your test.    Please allow 1-2 business days for our office to contact you in regards to your laboratory/radiological studies.  If not done so, I encourage you to login into [facility] ([email or web address] to review your results as well.     If you have any questions or concerns, please feel free to call us at [phone]    Sincerely,    Gregory Olson PA-C    Did you know?      You can schedule a video visit for follow-up appointments as well as future appointments for certain conditions.  Please see the below link.     [email or web address]    If you have not already done so,  I encourage you to sign up for [facility] ([email or web address]).  This will allow you to review your results, securely communicate with a provider, and schedule virtual visits as well.    Lung Cancer Screening   Frequently Asked Questions  If you are at high-risk for lung cancer, getting screened with low-dose computed tomography (LDCT) every year can help save your life. This handout offers answers to some of the most common questions about lung cancer screening. If you have other questions, please call [phone] ([phone]).     What is it?  Lung cancer screening uses special X-ray technology to create an image of your lung tissue. The exam is quick and easy and takes less than 10 seconds. We don t give you any medicine or use any needles. You can eat before and after the exam. You don t need to change your clothes as long as the clothing on your chest doesn t contain metal. But, you do need to be able to hold your breath for at least 6 seconds during the  exam.    What is the goal of lung cancer screening?  The goal of lung cancer screening is to save lives. Many times, lung cancer is not found until a person starts having physical symptoms. Lung cancer screening can help detect lung cancer in the earliest stages when it may be easier to treat.    Who should be screened for lung cancer?  We suggest lung cancer screening for anyone who is at high-risk for lung cancer. You are in the high-risk group if you:     are between the ages of 55 and 79, and   have smoked at least 1 pack of cigarettes a day for 20 or more years, and   still smoke or have quit within the past 15 years.    However, if you have a new cough or shortness of breath, you should talk to your doctor before being screened.    Why does it matter if I have symptoms?  Certain symptoms can be a sign that you have a condition in your lungs that should be checked and treated by your doctor. These symptoms include fever, chest pain, a new or changing cough, shortness of breath that you have never felt before, coughing up blood or unexplained weight loss. Having any of these symptoms can greatly affect the results of lung cancer screening.       Should all smokers get an LDCT lung cancer screening exam?  It depends. Lung cancer screening is for a very specific group of men and women who have a history of heavy smoking over a long period of time (see  Who should be screened for lung cancer  above).  I am in the high-risk group, but have been diagnosed with cancer in the past. Is LDCT lung cancer screening right for me?  In some cases, you should not have LDCT lung screening, such as when your doctor is already following your cancer with CT scan studies. Your doctor will help you decide if LDCT lung screening is right for you.  Do I need to have a screening exam every year?  Yes. If you are in the high-risk group described earlier, you should get an LDCT lung cancer screening exam every year until you are 79, or  are no longer willing or able to undergo screening and possible procedures to diagnose and treat lung cancer.  How effective is LDCT at preventing death from lung cancer?  Studies have shown that LDCT lung cancer screening can lower the risk of death from lung cancer by 20 percent in people who are at high-risk.  What are the risks?  There are some risks and limitations of LDCT lung cancer screening. We want to make sure you understand the risks and benefits, so please let us know if you have any questions. Your doctor may want to talk with you more about these risks.   Radiation exposure: As with any exam that uses radiation, there is a very small increased risk of cancer. The amount of radiation in LDCT is small--about the same amount a person would get from a mammogram. Your doctor orders the exam when he or she feels the potential benefits outweigh the risks.   False negatives: No test is perfect, including LDCT. It is possible that you may have a medical condition, including lung cancer, that is not found during your exam. This is called a false negative result.   False positives and more testing: LDCT very often finds something in the lung that could be cancer, but in fact is not. This is called a false positive result. False positive tests often cause anxiety. To make sure these findings are not cancer, you may need to have more tests. These tests will be done only if you give us permission. Sometimes patients need a treatment that can have side effects, such as a biopsy. For more information on false positives, see  What can I expect from the results?    Findings not related to lung cancer: Your LDCT exam also takes pictures of areas of your body next to your lungs. In a very small number of cases, the CT scan will show an abnormal finding in one of these areas, such as your kidneys, adrenal glands, liver or thyroid. This finding may not be serious, but you may need more tests. Your doctor can help you  decide what other tests you may need, if any.  What can I expect from the results?  About 1 out of 4 LDCT exams will find something that may need more tests. Most of the time, these findings are lung nodules. Lung nodules are very small collections of tissue in the lung. These nodules are very common, and the vast majority--more than 97 percent--are not cancer (benign). Most are normal lymph nodes or small areas of scarring from past infections.  But, if a small lung nodule is found to be cancer, the cancer can be cured more than 90 percent of the time. To know if the nodule is cancer, we may need to get more images before your next yearly screening exam. If the nodule has suspicious features (for example, it is large, has an odd shape or grows over time), we will refer you to a specialist for further testing.  Will my doctor also get the results?  Yes. Your doctor will get a copy of your results.  Is it okay to keep smoking now that there s a cancer screening exam?  No. Tobacco is one of the strongest cancer-causing agents. It causes not only lung cancer, but other cancers and cardiovascular (heart) diseases as well. The damage caused by smoking builds over time. This means that the longer you smoke, the higher your risk of disease. While it is never too late to quit, the sooner you quit, the better.  Where can I find help to quit smoking?  The best way to prevent lung cancer is to stop smoking. If you have already quit smoking, congratulations and keep it up! For help on quitting smoking, please call [employer] at 1-177-QUITNOW (1-985.605.1070) or the American Cancer Society at 1-414.724.6783 to find local resources near you.  One-on-one health coaching:  If you d prefer to work individually with a health care provider on tobacco cessation, we offer:     Medication Therapy Management:  Our specially trained pharmacists work closely with you and your doctor to help you quit smoking.  Call [phone] or  [phone] (toll free).

## 2023-06-12 NOTE — PROGRESS NOTES
"  Assessment & Plan   Problem List Items Addressed This Visit    None  Visit Diagnoses     Personal history of tobacco use    -  Primary    Relevant Orders    CT Chest Lung Cancer Scrn Low Dose wo         He was reassured that he merchant snot need repeat lung CA screening for 5-11 months. CT ordered. Patient reassured.    Complete history and physical exam as below. Afebrile with normal vital signs.    DDx and Dx discussed with and explained to the pt to their satisfaction.  All questions were answered at this time. Pt expressed understanding of and agreement with this dx, tx, and plan. No further workup warranted and standard medication warnings given. I have given the patient a list of pertinent indications for re-evaluation. Will go to the Emergency Department if symptoms worsen or new concerning symptoms arise. Patient left in no apparent distress.     Ordering of each unique test  28 minutes spent by me on the date of the encounter doing chart review, history and exam, documentation and further activities per the note     BMI:   Estimated body mass index is 31.64 kg/m  as calculated from the following:    Height as of this encounter: 1.855 m (6' 1.03\").    Weight as of this encounter: 108.9 kg (240 lb).   See Patient Instructions    JERMAIN Clancy  [facility] JENNIFER Sharma is a 62 year old, presenting for the following health issues:  Results        5/3/2023     8:03 AM   Additional Questions   Roomed by Hugh BANDA   Accompanied by VJ     History of Present Illness       Reason for visit:  Spot on lungs    He eats 2-3 servings of fruits and vegetables daily.He consumes 1 sweetened beverage(s) daily.He exercises with enough effort to increase his heart rate 10 to 19 minutes per day.  He exercises with enough effort to increase his heart rate 4 days per week.   He is taking medications regularly.     Discuss CT results from 5/9/23 and 6/9/23. On 5/9/23 he had a lung CA screening CT " "which showed a 7mm nodule in his lateral LL.. on 6/9/12/23, he had an CT in the ER that showed similar nodule size he is concerned about when he should next have his next screening performed.     Review of Systems   Constitutional, HEENT, cardiovascular, pulmonary, gi and gu systems are negative, except as otherwise noted.      Objective    /66   Pulse 87   Temp (!) 96.7  F (35.9  C) (Tympanic)   Resp 14   Ht 1.855 m (6' 1.03\")   Wt 108.9 kg (240 lb)   SpO2 98%   BMI 31.64 kg/m    Body mass index is 31.64 kg/m .  Physical Exam  Vitals and nursing note reviewed.   Constitutional:       General: He is not in acute distress.     Appearance: He is not ill-appearing or diaphoretic.   HENT:      Head: Normocephalic and atraumatic.      Mouth/Throat:      Mouth: Mucous membranes are moist.   Eyes:      Conjunctiva/sclera: Conjunctivae normal.   Cardiovascular:      Rate and Rhythm: Normal rate and regular rhythm.      Heart sounds: Normal heart sounds. No murmur heard.     No friction rub. No gallop.   Pulmonary:      Effort: Pulmonary effort is normal. No respiratory distress.      Breath sounds: Normal breath sounds. No stridor. No wheezing, rhonchi or rales.   Skin:     General: Skin is warm and dry.   Neurological:      General: No focal deficit present.      Mental Status: He is alert. Mental status is at baseline.   Psychiatric:         Mood and Affect: Mood normal.         Behavior: Behavior normal.                    " High Dose Vitamin A Counseling: Side effects reviewed, pt to contact office should one occur.

## 2023-06-12 NOTE — PROGRESS NOTES
Lung Cancer Screening Shared Decision Making Visit     Zachary Chand, a 62 year old male, is eligible for lung cancer screening    History   Smoking Status     Former     Packs/day: 1.00     Types: Cigarettes   Smokeless Tobacco     Never   {TIP  Follow this link to update the tobacco history if needed :523802}    I have discussed with patient the risks and benefits of screening for lung cancer with low-dose CT.     The risks include:    radiation exposure: one low dose chest CT has as much ionizing radiation as about 15 chest x-rays, or 6 months of background radiation living in [place]      false positives: most findings/nodules are NOT cancer, but some might still require additional diagnostic evaluation, including biopsy    over-diagnosis: some slow growing cancers that might never have been clinically significant will be detected and treated unnecessarily     The benefit of early detection of lung cancer is contingent upon adherence to annual screening or more frequent follow up if indicated.     Furthermore, to benefit from screening, Zachary must be willing and able to undergo diagnostic procedures, if indicated. Although no specific guide is available for determining severity of comorbidities, it is reasonable to withhold screening in patients who have greater mortality risk from other diseases.     We did discuss that the best way to prevent lung cancer is to not smoke.    Some patients may value a numeric estimation of lung cancer risk when evaluating if lung cancer screening is right for them, here is one calculator:    ShouldIScreen

## 2023-07-17 ENCOUNTER — PATIENT OUTREACH (OUTPATIENT)
Dept: CARE COORDINATION | Facility: CLINIC | Age: 63
End: 2023-07-17
Payer: COMMERCIAL

## 2023-07-19 DIAGNOSIS — E11.65 TYPE 2 DIABETES MELLITUS WITH HYPERGLYCEMIA, WITHOUT LONG-TERM CURRENT USE OF INSULIN (H): ICD-10-CM

## 2023-07-19 DIAGNOSIS — E11.51 TYPE 2 DIABETES MELLITUS WITH DIABETIC PERIPHERAL ANGIOPATHY WITHOUT GANGRENE, WITHOUT LONG-TERM CURRENT USE OF INSULIN (H): ICD-10-CM

## 2023-07-19 RX ORDER — EMPAGLIFLOZIN 25 MG/1
TABLET, FILM COATED ORAL
Qty: 90 TABLET | Refills: 0 | Status: SHIPPED | OUTPATIENT
Start: 2023-07-19 | End: 2023-10-30

## 2023-07-19 RX ORDER — LIRAGLUTIDE 6 MG/ML
INJECTION SUBCUTANEOUS
Qty: 18 ML | Refills: 0 | Status: SHIPPED | OUTPATIENT
Start: 2023-07-19 | End: 2023-09-28

## 2023-07-31 ENCOUNTER — PATIENT OUTREACH (OUTPATIENT)
Dept: CARE COORDINATION | Facility: CLINIC | Age: 63
End: 2023-07-31
Payer: COMMERCIAL

## 2023-08-07 ENCOUNTER — TELEPHONE (OUTPATIENT)
Dept: FAMILY MEDICINE | Facility: CLINIC | Age: 63
End: 2023-08-07
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
## 2023-08-17 NOTE — TELEPHONE ENCOUNTER
Left message on patient's voicemail to return call. When call is returned please schedule patient for a diabetes follow up with Michael Murphy.

## 2023-09-01 NOTE — TELEPHONE ENCOUNTER
Patient is not yet scheduled.    Re-route to TC team to reach out.  I see a refill request came in yesterday and Michael again states patient is due for a re-check.    Gabi ESCAMILLA RN  [facility] Triage

## 2023-09-17 ENCOUNTER — HEALTH MAINTENANCE LETTER (OUTPATIENT)
Age: 63
End: 2023-09-17

## 2023-09-18 ENCOUNTER — TELEPHONE (OUTPATIENT)
Dept: FAMILY MEDICINE | Facility: CLINIC | Age: 63
End: 2023-09-18
Payer: COMMERCIAL

## 2023-09-18 NOTE — TELEPHONE ENCOUNTER
General Call    Contacts         Type Contact Phone/Fax    09/18/2023 10:04 AM CDT Phone (Incoming) Zachary Chand (Self) [phone] (H)          Reason for Call:  Long Term Disability paperwork.     What are your questions or concerns:  Wondering if the last three summaries could be printed. Having a hard time with Long Term Disability paperwork. They keeps saying they aren't getting the paperwork. Zachary was hoping to be seen by Michael to discuss or to get some help. The apts offered weren't soon enough in his opinion and would like to discuss this sooner than what was offered.     Date of last appointment with provider: 5/3/2023     Could we send this information to you in [employer] [place] or would you prefer to receive a phone call?:   Patient would prefer a phone call   Okay to leave a detailed message?: Yes at Home number on file [phone] (home)

## 2023-09-26 ENCOUNTER — TELEPHONE (OUTPATIENT)
Dept: FAMILY MEDICINE | Facility: CLINIC | Age: 63
End: 2023-09-26
Payer: COMMERCIAL

## 2023-09-26 NOTE — TELEPHONE ENCOUNTER
Patient is scheduled to be seen in 2 days.    Gabi ESCAMILLA RN  [facility] Triage

## 2023-09-27 NOTE — TELEPHONE ENCOUNTER
Ok to work him into a same day slot today or tomorrow. What does this paperwork pertain to? Fmla, std?

## 2023-09-28 ENCOUNTER — OFFICE VISIT (OUTPATIENT)
Dept: FAMILY MEDICINE | Facility: CLINIC | Age: 63
End: 2023-09-28
Payer: COMMERCIAL

## 2023-09-28 VITALS
HEIGHT: 72 IN | TEMPERATURE: 97.2 F | WEIGHT: 241.4 LBS | BODY MASS INDEX: 32.7 KG/M2 | DIASTOLIC BLOOD PRESSURE: 70 MMHG | HEART RATE: 90 BPM | RESPIRATION RATE: 18 BRPM | SYSTOLIC BLOOD PRESSURE: 116 MMHG | OXYGEN SATURATION: 97 %

## 2023-09-28 DIAGNOSIS — E11.42 DIABETIC POLYNEUROPATHY ASSOCIATED WITH TYPE 2 DIABETES MELLITUS (H): ICD-10-CM

## 2023-09-28 DIAGNOSIS — Z12.11 SCREEN FOR COLON CANCER: ICD-10-CM

## 2023-09-28 DIAGNOSIS — I73.9 PAD (PERIPHERAL ARTERY DISEASE) (H): ICD-10-CM

## 2023-09-28 DIAGNOSIS — F51.04 PSYCHOPHYSIOLOGICAL INSOMNIA: ICD-10-CM

## 2023-09-28 DIAGNOSIS — J41.8 MIXED SIMPLE AND MUCOPURULENT CHRONIC BRONCHITIS (H): ICD-10-CM

## 2023-09-28 DIAGNOSIS — G89.29 CHRONIC BILATERAL LOW BACK PAIN WITHOUT SCIATICA: ICD-10-CM

## 2023-09-28 DIAGNOSIS — G89.29 CHRONIC NECK PAIN: ICD-10-CM

## 2023-09-28 DIAGNOSIS — M54.2 CHRONIC NECK PAIN: ICD-10-CM

## 2023-09-28 DIAGNOSIS — M54.50 CHRONIC BILATERAL LOW BACK PAIN WITHOUT SCIATICA: ICD-10-CM

## 2023-09-28 DIAGNOSIS — E11.51 TYPE 2 DIABETES MELLITUS WITH DIABETIC PERIPHERAL ANGIOPATHY WITHOUT GANGRENE, WITHOUT LONG-TERM CURRENT USE OF INSULIN (H): Primary | ICD-10-CM

## 2023-09-28 LAB — HBA1C MFR BLD: 8.2 % (ref 0–5.6)

## 2023-09-28 PROCEDURE — 99214 OFFICE O/P EST MOD 30 MIN: CPT | Performed by: PHYSICIAN ASSISTANT

## 2023-09-28 PROCEDURE — 36415 COLL VENOUS BLD VENIPUNCTURE: CPT | Performed by: PHYSICIAN ASSISTANT

## 2023-09-28 PROCEDURE — 83036 HEMOGLOBIN GLYCOSYLATED A1C: CPT | Performed by: PHYSICIAN ASSISTANT

## 2023-09-28 RX ORDER — LIRAGLUTIDE 6 MG/ML
1.2 INJECTION SUBCUTANEOUS DAILY
Qty: 18 ML | Refills: 0 | Status: SHIPPED | OUTPATIENT
Start: 2023-09-28 | End: 2024-01-02

## 2023-09-28 RX ORDER — TRAZODONE HYDROCHLORIDE 50 MG/1
50-100 TABLET, FILM COATED ORAL AT BEDTIME
Qty: 30 TABLET | Refills: 1 | Status: SHIPPED | OUTPATIENT
Start: 2023-09-28 | End: 2024-01-02

## 2023-09-28 RX ORDER — ASPIRIN 81 MG/1
81 TABLET ORAL DAILY
Qty: 90 TABLET | Refills: 3
Start: 2023-09-28

## 2023-09-28 ASSESSMENT — PAIN SCALES - GENERAL: PAINLEVEL: SEVERE PAIN (7)

## 2023-09-28 NOTE — PROGRESS NOTES
Lisa Sharma is a 63 year old, presenting for the following health issues:  Forms        9/28/2023    11:29 AM   Additional Questions   Roomed by Cassia   Accompanied by Self       History of Present Illness       Diabetes:   He presents for follow up of diabetes.  He is checking home blood glucose one time daily.   He checks blood glucose before meals.  Blood glucose is sometimes over 200 and never under 70. He is aware of hypoglycemia symptoms including shakiness, dizziness and blurred vision.   He is concerned about frequent infections and other.   He is having numbness in feet, burning in feet and blurry vision.            Heart Failure:  He presents for follow up of heart failure. He is not experiencing shortness of breath at night, with rest or with activity  He is experiencing lower extremity edema which is same as usual.   He denies orthopenea and is not coughing at night. Patient is not checking weight daily. He has recently had a None.  He has side effects from medications including fatigue.  He has had no other medical visits for heart failure since the last visit.    Hypertension: He presents for follow up of hypertension.  He does not check blood pressure  regularly outside of the clinic. Outpatient blood pressures have not been over 140/90. He follows a low salt diet.     He eats 0-1 servings of fruits and vegetables daily.He consumes 0 sweetened beverage(s) daily.He exercises with enough effort to increase his heart rate 9 or less minutes per day.  He exercises with enough effort to increase his heart rate 3 or less days per week.   He is taking medications regularly.     Last Echo: No results found.      Patient here to fill out forms for Long term disability     Neuropathy still an issue.  Recheck of copd. No profound cough. Occasional sob/chest tightness.  Insomnia.       Review of Systems   Constitutional, HEENT, cardiovascular, pulmonary, GI, , musculoskeletal, neuro, skin, endocrine  "and psych systems are negative, except as otherwise noted.      Objective    /70   Pulse 90   Temp 97.2  F (36.2  C) (Temporal)   Resp 18   Ht 1.817 m (5' 11.54\")   Wt 109.5 kg (241 lb 6.4 oz)   SpO2 97%   BMI 33.17 kg/m    Body mass index is 33.17 kg/m .  Physical Exam     BACK: Lumbosacral spine area reveals local tenderness.  Painful and reduced LS ROM noted. Straight leg raise is negative  DTR's, motor strength and sensation normal, including heel and toe gait.  Perifpheral pulses are palpable.  Hipes and knees have full range of motion without pain.  No abdominal tenderness, mass or organomegaly.  Neck rom limited in all directions.  Negative spurlings test.   Eye exam - right eye normal lid, conjunctiva, cornea, pupil and fundus, left eye normal lid, conjunctiva, cornea, pupil and fundus.  Thyroid not palpable, not enlarged, no nodules detected.  CHEST:chest clear to IPPA, no tachypnea, retractions or cyanosis, and S1, S2 normal, no murmur, no gallop, rate regular.    Zachary was seen today for forms.    Diagnoses and all orders for this visit:    Type 2 diabetes mellitus with diabetic peripheral angiopathy without gangrene, without long-term current use of insulin (H)  -     HEMOGLOBIN A1C; Future  -     HEMOGLOBIN A1C  -     liraglutide (VICTOZA PEN) 18 MG/3ML solution; Inject 1.2 mg Subcutaneous daily  -     insulin pen needle (32G X 4 MM) 32G X 4 MM miscellaneous; Use 1 pen needles daily or as directed.    Screen for colon cancer  -     Colonoscopy Screening  Referral; Future    Coronary atherosclerosis    Chronic bilateral low back pain without sciatica    Diabetic polyneuropathy associated with type 2 diabetes mellitus (H)  -     Colonoscopy Screening  Referral; Future  -     HEMOGLOBIN A1C; Future  -     HEMOGLOBIN A1C    Chronic neck pain    Psychophysiological insomnia  -     traZODone (DESYREL) 50 MG tablet; Take 1-2 tablets ( mg) by mouth At Bedtime    Mixed " simple and mucopurulent chronic bronchitis (H)    PAD (peripheral artery disease) (H)  -     aspirin 81 MG EC tablet; Take 1 tablet (81 mg) by mouth daily      Exercise  Lower sugar/carb diet.  Recheck in 3 mos   Paperwork completed/updated

## 2023-10-13 DIAGNOSIS — E11.65 TYPE 2 DIABETES MELLITUS WITH HYPERGLYCEMIA, WITHOUT LONG-TERM CURRENT USE OF INSULIN (H): ICD-10-CM

## 2023-10-13 RX ORDER — GLIPIZIDE 10 MG/1
TABLET, FILM COATED, EXTENDED RELEASE ORAL
Qty: 60 TABLET | Refills: 0 | Status: SHIPPED | OUTPATIENT
Start: 2023-10-13 | End: 2023-11-21

## 2023-10-30 DIAGNOSIS — M54.50 CHRONIC BILATERAL LOW BACK PAIN WITHOUT SCIATICA: ICD-10-CM

## 2023-10-30 DIAGNOSIS — E78.5 HYPERLIPIDEMIA LDL GOAL <70: ICD-10-CM

## 2023-10-30 DIAGNOSIS — I73.9 PAD (PERIPHERAL ARTERY DISEASE) (H): ICD-10-CM

## 2023-10-30 DIAGNOSIS — G89.29 CHRONIC BILATERAL LOW BACK PAIN WITHOUT SCIATICA: ICD-10-CM

## 2023-10-30 DIAGNOSIS — E11.65 TYPE 2 DIABETES MELLITUS WITH HYPERGLYCEMIA, WITHOUT LONG-TERM CURRENT USE OF INSULIN (H): ICD-10-CM

## 2023-10-30 RX ORDER — EMPAGLIFLOZIN 25 MG/1
TABLET, FILM COATED ORAL
Qty: 90 TABLET | Refills: 0 | Status: SHIPPED | OUTPATIENT
Start: 2023-10-30 | End: 2024-01-02

## 2023-10-30 RX ORDER — MELOXICAM 15 MG/1
15 TABLET ORAL DAILY
Qty: 90 TABLET | Refills: 0 | Status: SHIPPED | OUTPATIENT
Start: 2023-10-30 | End: 2024-01-30

## 2023-10-30 RX ORDER — ROSUVASTATIN CALCIUM 20 MG/1
20 TABLET, COATED ORAL DAILY
Qty: 90 TABLET | Refills: 0 | Status: SHIPPED | OUTPATIENT
Start: 2023-10-30 | End: 2024-01-02

## 2023-10-31 DIAGNOSIS — E11.65 TYPE 2 DIABETES MELLITUS WITH HYPERGLYCEMIA, WITHOUT LONG-TERM CURRENT USE OF INSULIN (H): ICD-10-CM

## 2023-11-21 DIAGNOSIS — E11.65 TYPE 2 DIABETES MELLITUS WITH HYPERGLYCEMIA, WITHOUT LONG-TERM CURRENT USE OF INSULIN (H): ICD-10-CM

## 2023-11-21 RX ORDER — GLIPIZIDE 10 MG/1
TABLET, FILM COATED, EXTENDED RELEASE ORAL
Qty: 180 TABLET | Refills: 0 | OUTPATIENT
Start: 2023-11-21

## 2023-11-21 RX ORDER — GLIPIZIDE 10 MG/1
TABLET, FILM COATED, EXTENDED RELEASE ORAL
Qty: 180 TABLET | Refills: 0 | Status: SHIPPED | OUTPATIENT
Start: 2023-11-21 | End: 2024-02-05

## 2024-01-02 ENCOUNTER — OFFICE VISIT (OUTPATIENT)
Dept: FAMILY MEDICINE | Facility: CLINIC | Age: 64
End: 2024-01-02
Payer: COMMERCIAL

## 2024-01-02 VITALS
TEMPERATURE: 97.4 F | HEIGHT: 73 IN | BODY MASS INDEX: 30.88 KG/M2 | WEIGHT: 233 LBS | SYSTOLIC BLOOD PRESSURE: 112 MMHG | HEART RATE: 94 BPM | OXYGEN SATURATION: 97 % | RESPIRATION RATE: 20 BRPM | DIASTOLIC BLOOD PRESSURE: 64 MMHG

## 2024-01-02 DIAGNOSIS — E78.5 HYPERLIPIDEMIA LDL GOAL <70: ICD-10-CM

## 2024-01-02 DIAGNOSIS — Z12.11 SCREEN FOR COLON CANCER: ICD-10-CM

## 2024-01-02 DIAGNOSIS — I10 HYPERTENSION GOAL BP (BLOOD PRESSURE) < 140/90: ICD-10-CM

## 2024-01-02 DIAGNOSIS — I50.20 HFREF (HEART FAILURE WITH REDUCED EJECTION FRACTION) (H): ICD-10-CM

## 2024-01-02 DIAGNOSIS — J44.9 COPD, MILD (H): ICD-10-CM

## 2024-01-02 DIAGNOSIS — E11.65 TYPE 2 DIABETES MELLITUS WITH HYPERGLYCEMIA, WITHOUT LONG-TERM CURRENT USE OF INSULIN (H): ICD-10-CM

## 2024-01-02 DIAGNOSIS — Z00.00 ROUTINE GENERAL MEDICAL EXAMINATION AT A HEALTH CARE FACILITY: Primary | ICD-10-CM

## 2024-01-02 DIAGNOSIS — H91.91 CHANGE IN HEARING OF RIGHT EAR: ICD-10-CM

## 2024-01-02 DIAGNOSIS — F51.04 PSYCHOPHYSIOLOGICAL INSOMNIA: ICD-10-CM

## 2024-01-02 DIAGNOSIS — E78.5 HYPERLIPIDEMIA LDL GOAL <100: ICD-10-CM

## 2024-01-02 DIAGNOSIS — I73.9 PAD (PERIPHERAL ARTERY DISEASE) (H): ICD-10-CM

## 2024-01-02 DIAGNOSIS — E11.51 TYPE 2 DIABETES MELLITUS WITH DIABETIC PERIPHERAL ANGIOPATHY WITHOUT GANGRENE, WITHOUT LONG-TERM CURRENT USE OF INSULIN (H): ICD-10-CM

## 2024-01-02 LAB
ANION GAP SERPL CALCULATED.3IONS-SCNC: 13 MMOL/L (ref 7–15)
BUN SERPL-MCNC: 15 MG/DL (ref 8–23)
CALCIUM SERPL-MCNC: 9.7 MG/DL (ref 8.8–10.2)
CHLORIDE SERPL-SCNC: 102 MMOL/L (ref 98–107)
CHOLEST SERPL-MCNC: 123 MG/DL
CREAT SERPL-MCNC: 1.01 MG/DL (ref 0.67–1.17)
DEPRECATED HCO3 PLAS-SCNC: 22 MMOL/L (ref 22–29)
EGFRCR SERPLBLD CKD-EPI 2021: 84 ML/MIN/1.73M2
ERYTHROCYTE [DISTWIDTH] IN BLOOD BY AUTOMATED COUNT: 11.6 % (ref 10–15)
FASTING STATUS PATIENT QL REPORTED: YES
GLUCOSE SERPL-MCNC: 146 MG/DL (ref 70–99)
HBA1C MFR BLD: 7.4 % (ref 0–5.6)
HCT VFR BLD AUTO: 47.4 % (ref 40–53)
HDLC SERPL-MCNC: 33 MG/DL
HGB BLD-MCNC: 15.7 G/DL (ref 13.3–17.7)
LDLC SERPL CALC-MCNC: 67 MG/DL
MCH RBC QN AUTO: 32 PG (ref 26.5–33)
MCHC RBC AUTO-ENTMCNC: 33.1 G/DL (ref 31.5–36.5)
MCV RBC AUTO: 97 FL (ref 78–100)
NONHDLC SERPL-MCNC: 90 MG/DL
PLATELET # BLD AUTO: 238 10E3/UL (ref 150–450)
POTASSIUM SERPL-SCNC: 4.7 MMOL/L (ref 3.4–5.3)
RBC # BLD AUTO: 4.91 10E6/UL (ref 4.4–5.9)
SODIUM SERPL-SCNC: 137 MMOL/L (ref 135–145)
TRIGL SERPL-MCNC: 113 MG/DL
WBC # BLD AUTO: 4.9 10E3/UL (ref 4–11)

## 2024-01-02 PROCEDURE — 83036 HEMOGLOBIN GLYCOSYLATED A1C: CPT | Performed by: PHYSICIAN ASSISTANT

## 2024-01-02 PROCEDURE — 80048 BASIC METABOLIC PNL TOTAL CA: CPT | Performed by: PHYSICIAN ASSISTANT

## 2024-01-02 PROCEDURE — 36415 COLL VENOUS BLD VENIPUNCTURE: CPT | Performed by: PHYSICIAN ASSISTANT

## 2024-01-02 PROCEDURE — 99214 OFFICE O/P EST MOD 30 MIN: CPT | Mod: 25 | Performed by: PHYSICIAN ASSISTANT

## 2024-01-02 PROCEDURE — 99396 PREV VISIT EST AGE 40-64: CPT | Performed by: PHYSICIAN ASSISTANT

## 2024-01-02 PROCEDURE — 85027 COMPLETE CBC AUTOMATED: CPT | Performed by: PHYSICIAN ASSISTANT

## 2024-01-02 PROCEDURE — 80061 LIPID PANEL: CPT | Performed by: PHYSICIAN ASSISTANT

## 2024-01-02 RX ORDER — METOPROLOL SUCCINATE 25 MG/1
12.5 TABLET, EXTENDED RELEASE ORAL 2 TIMES DAILY
Qty: 90 TABLET | Refills: 3 | Status: SHIPPED | OUTPATIENT
Start: 2024-01-02 | End: 2024-08-14

## 2024-01-02 RX ORDER — LISINOPRIL 40 MG/1
TABLET ORAL
Qty: 90 TABLET | Refills: 3 | Status: SHIPPED | OUTPATIENT
Start: 2024-01-02

## 2024-01-02 RX ORDER — LIRAGLUTIDE 6 MG/ML
1.8 INJECTION SUBCUTANEOUS DAILY
Qty: 18 ML | Refills: 0 | Status: SHIPPED | OUTPATIENT
Start: 2024-01-02 | End: 2024-04-04

## 2024-01-02 RX ORDER — PREGABALIN 50 MG/1
50 CAPSULE ORAL 2 TIMES DAILY
Qty: 180 CAPSULE | Refills: 3 | Status: SHIPPED | OUTPATIENT
Start: 2024-01-02

## 2024-01-02 RX ORDER — ROSUVASTATIN CALCIUM 20 MG/1
20 TABLET, COATED ORAL DAILY
Qty: 90 TABLET | Refills: 3 | Status: SHIPPED | OUTPATIENT
Start: 2024-01-02 | End: 2024-08-14

## 2024-01-02 RX ORDER — TRAZODONE HYDROCHLORIDE 50 MG/1
50-100 TABLET, FILM COATED ORAL AT BEDTIME
Qty: 30 TABLET | Refills: 1 | Status: SHIPPED | OUTPATIENT
Start: 2024-01-02

## 2024-01-02 ASSESSMENT — ENCOUNTER SYMPTOMS
PARESTHESIAS: 0
HEMATURIA: 0
CONSTIPATION: 0
JOINT SWELLING: 0
ARTHRALGIAS: 0
NAUSEA: 0
FREQUENCY: 0
EYE PAIN: 0
SHORTNESS OF BREATH: 0
CHILLS: 0
WEAKNESS: 0
COUGH: 0
PALPITATIONS: 0
MYALGIAS: 0
HEMATOCHEZIA: 0
NERVOUS/ANXIOUS: 1
FEVER: 0
DIZZINESS: 0
DIARRHEA: 0
HEADACHES: 0
SORE THROAT: 0
HEARTBURN: 0
ABDOMINAL PAIN: 0
DYSURIA: 0

## 2024-01-02 ASSESSMENT — PAIN SCALES - GENERAL: PAINLEVEL: MODERATE PAIN (4)
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
## 2024-02-04 DIAGNOSIS — E11.65 TYPE 2 DIABETES MELLITUS WITH HYPERGLYCEMIA, WITHOUT LONG-TERM CURRENT USE OF INSULIN (H): ICD-10-CM

## 2024-02-05 DIAGNOSIS — E11.65 TYPE 2 DIABETES MELLITUS WITH HYPERGLYCEMIA, WITHOUT LONG-TERM CURRENT USE OF INSULIN (H): ICD-10-CM

## 2024-02-05 RX ORDER — GLIPIZIDE 10 MG/1
TABLET, FILM COATED, EXTENDED RELEASE ORAL
Qty: 180 TABLET | Refills: 0 | Status: SHIPPED | OUTPATIENT
Start: 2024-02-05 | End: 2024-05-06

## 2024-04-04 DIAGNOSIS — E11.51 TYPE 2 DIABETES MELLITUS WITH DIABETIC PERIPHERAL ANGIOPATHY WITHOUT GANGRENE, WITHOUT LONG-TERM CURRENT USE OF INSULIN (H): ICD-10-CM

## 2024-04-04 DIAGNOSIS — E11.65 TYPE 2 DIABETES MELLITUS WITH HYPERGLYCEMIA, WITHOUT LONG-TERM CURRENT USE OF INSULIN (H): ICD-10-CM

## 2024-04-04 RX ORDER — EMPAGLIFLOZIN 25 MG/1
25 TABLET, FILM COATED ORAL DAILY
Qty: 90 TABLET | Refills: 0 | Status: SHIPPED | OUTPATIENT
Start: 2024-04-04 | End: 2024-08-14

## 2024-04-04 RX ORDER — LIRAGLUTIDE 6 MG/ML
INJECTION SUBCUTANEOUS
Qty: 9 ML | Refills: 0 | Status: SHIPPED | OUTPATIENT
Start: 2024-04-04 | End: 2024-08-14

## 2024-04-14 ENCOUNTER — HEALTH MAINTENANCE LETTER (OUTPATIENT)
Age: 64
End: 2024-04-14

## 2024-05-04 DIAGNOSIS — E11.65 TYPE 2 DIABETES MELLITUS WITH HYPERGLYCEMIA, WITHOUT LONG-TERM CURRENT USE OF INSULIN (H): ICD-10-CM

## 2024-05-06 RX ORDER — GLIPIZIDE 10 MG/1
TABLET, FILM COATED, EXTENDED RELEASE ORAL
Qty: 180 TABLET | Refills: 0 | Status: SHIPPED | OUTPATIENT
Start: 2024-05-06 | End: 2024-08-14

## 2024-05-07 ENCOUNTER — OFFICE VISIT (OUTPATIENT)
Dept: OTOLARYNGOLOGY | Facility: CLINIC | Age: 64
End: 2024-05-07
Payer: COMMERCIAL

## 2024-05-07 ENCOUNTER — TELEPHONE (OUTPATIENT)
Dept: OTOLARYNGOLOGY | Facility: CLINIC | Age: 64
End: 2024-05-07

## 2024-05-07 ENCOUNTER — OFFICE VISIT (OUTPATIENT)
Dept: AUDIOLOGY | Facility: CLINIC | Age: 64
End: 2024-05-07
Payer: COMMERCIAL

## 2024-05-07 VITALS
OXYGEN SATURATION: 97 % | DIASTOLIC BLOOD PRESSURE: 75 MMHG | SYSTOLIC BLOOD PRESSURE: 124 MMHG | RESPIRATION RATE: 18 BRPM | HEART RATE: 87 BPM

## 2024-05-07 DIAGNOSIS — H90.3 SNHL (SENSORY-NEURAL HEARING LOSS), ASYMMETRICAL: Primary | ICD-10-CM

## 2024-05-07 DIAGNOSIS — H91.91 CHANGE IN HEARING OF RIGHT EAR: ICD-10-CM

## 2024-05-07 DIAGNOSIS — H90.3 ASYMMETRICAL SENSORINEURAL HEARING LOSS: Primary | ICD-10-CM

## 2024-05-07 DIAGNOSIS — H90.3 SNHL (SENSORY-NEURAL HEARING LOSS), ASYMMETRICAL: ICD-10-CM

## 2024-05-07 DIAGNOSIS — H93.8X1 PRESSURE SENSATION IN EAR, RIGHT: ICD-10-CM

## 2024-05-07 PROCEDURE — 92557 COMPREHENSIVE HEARING TEST: CPT | Performed by: AUDIOLOGIST

## 2024-05-07 PROCEDURE — 92567 TYMPANOMETRY: CPT | Performed by: AUDIOLOGIST

## 2024-05-07 PROCEDURE — 99244 OFF/OP CNSLTJ NEW/EST MOD 40: CPT | Performed by: OTOLARYNGOLOGY
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
## 2024-05-07 NOTE — LETTER
5/7/2024         RE: Zachary Chand  [id number] [facility] [address]        Dear Colleague,    Thank you for referring your patient, Zachary Chand, to the [facility]. Please see a copy of my visit note below.    I am seeing this patient in consultation for change in hearing right ear at the request of the provider Michael Murphy.      Chief Complaint - Hearing loss    History of Present Illness - Zachary Chand is a 63 year old male who presents to me today with hearing loss in the right ear.  It has been present and noticeable for approximately 5-6 months.  It was sudden in onset and maybe occurred while hunting.  The patient has noticed increased difficulty hearing. He feels hearing was bad before this happened, but now is much worse.  There is no history of recent head trauma, or chronic ear disease or ear surgery.  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     Tests personally reviewed today for this visit:   1.) audiogram was performed today as part of the evaluation and personally reviewed. The audiogram showed a significant asymmetric low to high frequency sensorineural hearing loss in both ears. Right ear is much worse in the low tones.  2.) tympanograms were performed today and were normal Type A curves, with normal canal volume and middle ear pressure.   3.) CBC 1/2024 was normal  4.) MRI brain ordered today.    Past Medical History -   Patient Active Problem List   Diagnosis     Chronic daily headache     Type 2 diabetes mellitus with hyperglycemia, without long-term current use of insulin (H)     Hyperlipidemia LDL goal <100     Hypertension goal BP (blood pressure) < 140/90     Venous insufficiency     Bee sting allergy     Contusion of bone     Varicose 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     Tobacco use disorder     Obesity     Type 2 diabetes mellitus  with diabetic peripheral angiopathy without gangrene, without long-term current use of insulin (H)     PAD (peripheral artery disease) (H24)     Acute ST elevation myocardial infarction (STEMI) of anterior wall (H)     Coronary atherosclerosis     Dependence on nicotine from cigarettes     Dyslipidemia     HFrEF (heart failure with reduced ejection fraction) (H)     Low back pain     Pain in upper limb     COPD, mild (H)       Current Medications -   Current Outpatient Medications:      aspirin 81 MG EC tablet, Take 1 tablet (81 mg) by mouth daily, Disp: 90 tablet, Rfl: 3     blood glucose (CONTOUR NEXT TEST) test strip, TEST ONCE DAILY, Disp: 100 strip, Rfl: 0     blood glucose (NO BRAND SPECIFIED) test strip, Use to test blood sugars 2  times daily or as directed, Disp: 100 strip, Rfl: 3     blood glucose monitoring (NO BRAND SPECIFIED) meter device kit, Use to test blood sugar 1 times daily or as directed.  Also refills on strips and lancets, refills for a year, Disp: 1 kit, Rfl: 11     cilostazol (PLETAL) 50 MG tablet, Take 1 tablet (50 mg) by mouth 2 times daily, Disp: 60 tablet, Rfl: 3     glipiZIDE (GLUCOTROL XL) 10 MG 24 hr tablet, TAKE 2 TABLETS(20 MG) BY MOUTH DAILY, Disp: 180 tablet, Rfl: 0     insulin pen needle (32G X 4 MM) 32G X 4 MM miscellaneous, Use 1 pen needles daily or as directed., Disp: 100 each, Rfl: 3     insulin pen needle (32G X 4 MM) 32G X 4 MM miscellaneous, Use 1 pen needles daily or as directed., Disp: 100 each, Rfl: 3     JARDIANCE 25 MG TABS tablet, TAKE 1 TABLET(25 MG) BY MOUTH DAILY, Disp: 90 tablet, Rfl: 0     L-Methylfolate-B6-B12 3-35-2 MG TABS, 1 tab twice a day for 4 week, then take 1 tab daily thereafter, Disp: 90 tablet, Rfl: 3     lisinopril (ZESTRIL) 40 MG tablet, TAKE 1 TABLET BY MOUTH EVERY DAY., Disp: 90 tablet, Rfl: 3     meloxicam (MOBIC) 15 MG tablet, TAKE 1 TABLET(15 MG) BY MOUTH DAILY, Disp: 90 tablet, Rfl: 0     metFORMIN (GLUCOPHAGE) 1000 MG tablet, TAKE 1 TABLET  BY MOUTH TWICE DAILY WITH MEALS., Disp: 180 tablet, Rfl: 0     metoprolol succinate ER (TOPROL XL) 25 MG 24 hr tablet, Take 0.5 tablets (12.5 mg) by mouth 2 times daily, Disp: 90 tablet, Rfl: 3     Multiple Vitamin (MULTIVITAMIN ADULT PO), , Disp: , Rfl:      nitroGLYcerin (NITROSTAT) 0.4 MG sublingual tablet, For chest pain place 1 tablet under the tongue every 5 minutes for 3 doses. If symptoms persist 5 minutes after 1st dose call 911., Disp: 25 tablet, Rfl: 1     oxyCODONE-acetaminophen (PERCOCET) 5-325 MG tablet, Take 1 tablet by mouth every 6 hours as needed for pain, Disp: 25 tablet, Rfl: 0     prasugrel (EFFIENT) 10 MG TABS tablet, Take 10 mg by mouth daily, Disp: , Rfl:      pregabalin (LYRICA) 50 MG capsule, Take 1 capsule (50 mg) by mouth 2 times daily, Disp: 180 capsule, Rfl: 3     rosuvastatin (CRESTOR) 20 MG tablet, Take 1 tablet (20 mg) by mouth daily, Disp: 90 tablet, Rfl: 3     traZODone (DESYREL) 50 MG tablet, Take 1-2 tablets ( mg) by mouth at bedtime, Disp: 30 tablet, Rfl: 1     VICTOZA PEN 18 MG/3ML soln, INJECT 1.8MG UNDER THE SKIN ONCE DAILY, Disp: 9 mL, Rfl: 0    Allergies -   Allergies   Allergen Reactions     Bee Pollen      Swelling      Novocain [Procaine] Other (See Comments)     Per pt states having panic attacks, breathing.       Social History -   Social History     Socioeconomic History     Marital status: Single     Number of children: 0     Years of education: 12   Occupational History     Occupation: [employer]     Employer: [employer]   Tobacco Use     Smoking status: Former     Current packs/day: 1.00     Types: Cigarettes     Passive exposure: Never     Smokeless tobacco: Never   Vaping Use     Vaping status: Never Used   Substance and Sexual Activity     Alcohol use: Yes     Comment: 2 beers per day     Drug use: No     Sexual activity: Yes     Partners: Female   Other Topics Concern     Parent/sibling w/ CABG, MI or angioplasty before 65F 55M? No     Social Determinants  of Health     Financial Resource Strain: Low Risk  (1/2/2024)    Financial Resource Strain      Within the past 12 months, have you or your family members you live with been unable to get utilities (heat, electricity) when it was really needed?: No   Food Insecurity: Low Risk  (1/2/2024)    Food Insecurity      Within the past 12 months, did you worry that your food would run out before you got money to buy more?: No      Within the past 12 months, did the food you bought just not last and you didn t have money to get more?: No   Transportation Needs: Low Risk  (1/2/2024)    Transportation Needs      Within the past 12 months, has lack of transportation kept you from medical appointments, getting your medicines, non-medical meetings or appointments, work, or from getting things that you need?: No    Received from [facility], [facility]    Social Connections   Interpersonal Safety: Low Risk  (1/2/2024)    Interpersonal Safety      Do you feel physically and emotionally safe where you currently live?: Yes      Within the past 12 months, have you been hit, slapped, kicked or otherwise physically hurt by someone?: No      Within the past 12 months, have you been humiliated or emotionally abused in other ways by your partner or ex-partner?: No   Housing Stability: Low Risk  (1/2/2024)    Housing Stability      Do you have housing? : Yes      Are you worried about losing your housing?: No       Family History -   Family History   Problem Relation Age of Onset     Alcohol/Drug Father      Review of Systems - As per HPI and PMHx, otherwise 10+ comprehensive system review is negative.    Physical Exam  General - The patient is in no distress.  Alert and oriented to person and place, answers questions and cooperates with examination appropriately.   Voice and Breathing - The patient was breathing comfortably without the use of accessory muscles. There was no  wheezing, stridor, or stertor.  The patients voice was clear and strong.  Ears - The auricles are normal. The tympanic membranes are normal in appearance, bony landmarks are intact.  No retraction, perforation, or masses.  No fluid or purulence was seen in the external canal or the middle ear. No evidence of infection of the middle ear or external canal, cerumen was normal in appearance.  Eyes - Extraocular movements intact.  Sclera were not icteric or injected.  Mouth - Examination of the oral cavity showed pink, healthy mucosa. No lesions or ulcerations noted.  The tongue was mobile and midline.  Throat - The walls of the oropharynx were smooth, symmetric, and had no lesions or ulcerations. The uvula was midline on elevation.    Neck - Soft, non-tender. Palpation of the occipital, submental, submandibular, internal jugular chain, and supraclavicular nodes did not demonstrate any abnormal lymph nodes or masses. Parotid glands had no masses. Palpation of the thyroid was soft and smooth, with no nodules or goiter appreciated.  The trachea was mobile and midline.  Neurological - Cranial nerves 2 through 12 were grossly intact. House-Brackmann grade 1 out of 6 bilaterally.   Cardiovascular - carotid pulses are 2+ bilaterally, regular rhythm      Assessment and Plan -     ICD-10-CM    1. SNHL (sensory-neural hearing loss), asymmetrical  H90.3 Adult Audiology  Referral     MR Brain w/o & w Contrast      2. Change in hearing of right ear  H91.91 Adult ENT  Referral          Zachary Chand is a 63 year old male who presents to me today with asymmetric sensorineural hearing loss.  His hearing in both ears in the upper tones is poor but with the new significant asymmetry in the right earlobe mid tones he is having substantial issues hearing.  Some of this may be due to noise exposure and age but the etiology of the sudden right-sided loss is uncertain.  I recommend MRI brain to rule out retrocochlear  pathology.  Repeat audiogram in 6 months.  I recommend a hearing aid consultation and hearing aids.  He likely will need a BiCROS aid.    Robert Bruce MD  Otolaryngology  [facility]        Again, thank you for allowing me to participate in the care of your patient.        Sincerely,        Robert Bruce MD

## 2024-05-07 NOTE — TELEPHONE ENCOUNTER
Left message with callback number to schedule appointment for open MRI with [facility] in [place].  Orders faxed to [facility].    Radha Avila RN Care Coordinator, [facility] 05/07/24 9:41 AM

## 2024-05-07 NOTE — TELEPHONE ENCOUNTER
----- Message from Robert Bruce MD sent at 5/7/2024  9:28 AM CDT -----  Regarding: patient needs open MRI  Karan Garcia,    THis patient is very claustrophobic. Needs an open MRI. Can you call [facility] or [facility] and fax my order and get him an open MRI scheduled? If not, he said he needed to be completely sedated and out.     Thanks

## 2024-05-07 NOTE — PROGRESS NOTES
I am seeing this patient in consultation for change in hearing right ear at the request of the provider Michael Murphy.      Chief Complaint - Hearing loss    History of Present Illness - Zachary Chand is a 63 year old male who presents to me today with hearing loss in the right ear.  It has been present and noticeable for approximately 5-6 months.  It was sudden in onset and maybe occurred while hunting.  The patient has noticed increased difficulty hearing. He feels hearing was bad before this happened, but now is much worse.  There is no history of recent head trauma, or chronic ear disease or ear surgery.  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     Tests personally reviewed today for this visit:   1.) audiogram was performed today as part of the evaluation and personally reviewed. The audiogram showed a significant asymmetric low to high frequency sensorineural hearing loss in both ears. Right ear is much worse in the low tones.  2.) tympanograms were performed today and were normal Type A curves, with normal canal volume and middle ear pressure.   3.) CBC 1/2024 was normal  4.) MRI brain ordered today.    Past Medical History -   Patient Active Problem List   Diagnosis    Chronic daily headache    Type 2 diabetes mellitus with hyperglycemia, without long-term current use of insulin (H)    Hyperlipidemia LDL goal <100    Hypertension goal BP (blood pressure) < 140/90    Venous insufficiency    Bee sting allergy    Contusion of bone    Varicose 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    Tobacco use disorder    Obesity    Type 2 diabetes mellitus with diabetic peripheral angiopathy without gangrene, without long-term current use of insulin (H)    PAD (peripheral artery disease) (H24)    Acute ST elevation myocardial infarction (STEMI) of anterior wall (H)    Coronary atherosclerosis    Dependence on  nicotine from cigarettes    Dyslipidemia    HFrEF (heart failure with reduced ejection fraction) (H)    Low back pain    Pain in upper limb    COPD, mild (H)       Current Medications -   Current Outpatient Medications:     aspirin 81 MG EC tablet, Take 1 tablet (81 mg) by mouth daily, Disp: 90 tablet, Rfl: 3    blood glucose (CONTOUR NEXT TEST) test strip, TEST ONCE DAILY, Disp: 100 strip, Rfl: 0    blood glucose (NO BRAND SPECIFIED) test strip, Use to test blood sugars 2  times daily or as directed, Disp: 100 strip, Rfl: 3    blood glucose monitoring (NO BRAND SPECIFIED) meter device kit, Use to test blood sugar 1 times daily or as directed.  Also refills on strips and lancets, refills for a year, Disp: 1 kit, Rfl: 11    cilostazol (PLETAL) 50 MG tablet, Take 1 tablet (50 mg) by mouth 2 times daily, Disp: 60 tablet, Rfl: 3    glipiZIDE (GLUCOTROL XL) 10 MG 24 hr tablet, TAKE 2 TABLETS(20 MG) BY MOUTH DAILY, Disp: 180 tablet, Rfl: 0    insulin pen needle (32G X 4 MM) 32G X 4 MM miscellaneous, Use 1 pen needles daily or as directed., Disp: 100 each, Rfl: 3    insulin pen needle (32G X 4 MM) 32G X 4 MM miscellaneous, Use 1 pen needles daily or as directed., Disp: 100 each, Rfl: 3    JARDIANCE 25 MG TABS tablet, TAKE 1 TABLET(25 MG) BY MOUTH DAILY, Disp: 90 tablet, Rfl: 0    L-Methylfolate-B6-B12 3-35-2 MG TABS, 1 tab twice a day for 4 week, then take 1 tab daily thereafter, Disp: 90 tablet, Rfl: 3    lisinopril (ZESTRIL) 40 MG tablet, TAKE 1 TABLET BY MOUTH EVERY DAY., Disp: 90 tablet, Rfl: 3    meloxicam (MOBIC) 15 MG tablet, TAKE 1 TABLET(15 MG) BY MOUTH DAILY, Disp: 90 tablet, Rfl: 0    metFORMIN (GLUCOPHAGE) 1000 MG tablet, TAKE 1 TABLET BY MOUTH TWICE DAILY WITH MEALS., Disp: 180 tablet, Rfl: 0    metoprolol succinate ER (TOPROL XL) 25 MG 24 hr tablet, Take 0.5 tablets (12.5 mg) by mouth 2 times daily, Disp: 90 tablet, Rfl: 3    Multiple Vitamin (MULTIVITAMIN ADULT PO), , Disp: , Rfl:     nitroGLYcerin  (NITROSTAT) 0.4 MG sublingual tablet, For chest pain place 1 tablet under the tongue every 5 minutes for 3 doses. If symptoms persist 5 minutes after 1st dose call 911., Disp: 25 tablet, Rfl: 1    oxyCODONE-acetaminophen (PERCOCET) 5-325 MG tablet, Take 1 tablet by mouth every 6 hours as needed for pain, Disp: 25 tablet, Rfl: 0    prasugrel (EFFIENT) 10 MG TABS tablet, Take 10 mg by mouth daily, Disp: , Rfl:     pregabalin (LYRICA) 50 MG capsule, Take 1 capsule (50 mg) by mouth 2 times daily, Disp: 180 capsule, Rfl: 3    rosuvastatin (CRESTOR) 20 MG tablet, Take 1 tablet (20 mg) by mouth daily, Disp: 90 tablet, Rfl: 3    traZODone (DESYREL) 50 MG tablet, Take 1-2 tablets ( mg) by mouth at bedtime, Disp: 30 tablet, Rfl: 1    VICTOZA PEN 18 MG/3ML soln, INJECT 1.8MG UNDER THE SKIN ONCE DAILY, Disp: 9 mL, Rfl: 0    Allergies -   Allergies   Allergen Reactions    Bee Pollen      Swelling     Novocain [Procaine] Other (See Comments)     Per pt states having panic attacks, breathing.       Social History -   Social History     Socioeconomic History    Marital status: Single    Number of children: 0    Years of education: 12   Occupational History    Occupation: [employer]     Employer: [employer]   Tobacco Use    Smoking status: Former     Current packs/day: 1.00     Types: Cigarettes     Passive exposure: Never    Smokeless tobacco: Never   Vaping Use    Vaping status: Never Used   Substance and Sexual Activity    Alcohol use: Yes     Comment: 2 beers per day    Drug use: No    Sexual activity: Yes     Partners: Female   Other Topics Concern    Parent/sibling w/ CABG, MI or angioplasty before 65F 55M? No     Social Determinants of Health     Financial Resource Strain: Low Risk  (1/2/2024)    Financial Resource Strain     Within the past 12 months, have you or your family members you live with been unable to get utilities (heat, electricity) when it was really needed?: No   Food Insecurity: Low Risk  (1/2/2024)     Food Insecurity     Within the past 12 months, did you worry that your food would run out before you got money to buy more?: No     Within the past 12 months, did the food you bought just not last and you didn t have money to get more?: No   Transportation Needs: Low Risk  (1/2/2024)    Transportation Needs     Within the past 12 months, has lack of transportation kept you from medical appointments, getting your medicines, non-medical meetings or appointments, work, or from getting things that you need?: No    Received from [facility], [facility]    Social Connections   Interpersonal Safety: Low Risk  (1/2/2024)    Interpersonal Safety     Do you feel physically and emotionally safe where you currently live?: Yes     Within the past 12 months, have you been hit, slapped, kicked or otherwise physically hurt by someone?: No     Within the past 12 months, have you been humiliated or emotionally abused in other ways by your partner or ex-partner?: No   Housing Stability: Low Risk  (1/2/2024)    Housing Stability     Do you have housing? : Yes     Are you worried about losing your housing?: No       Family History -   Family History   Problem Relation Age of Onset    Alcohol/Drug Father      Review of Systems - As per HPI and PMHx, otherwise 10+ comprehensive system review is negative.    Physical Exam  General - The patient is in no distress.  Alert and oriented to person and place, answers questions and cooperates with examination appropriately.   Voice and Breathing - The patient was breathing comfortably without the use of accessory muscles. There was no wheezing, stridor, or stertor.  The patients voice was clear and strong.  Ears - The auricles are normal. The tympanic membranes are normal in appearance, bony landmarks are intact.  No retraction, perforation, or masses.  No fluid or purulence was seen in the external canal or the middle ear. No  evidence of infection of the middle ear or external canal, cerumen was normal in appearance.  Eyes - Extraocular movements intact.  Sclera were not icteric or injected.  Mouth - Examination of the oral cavity showed pink, healthy mucosa. No lesions or ulcerations noted.  The tongue was mobile and midline.  Throat - The walls of the oropharynx were smooth, symmetric, and had no lesions or ulcerations. The uvula was midline on elevation.    Neck - Soft, non-tender. Palpation of the occipital, submental, submandibular, internal jugular chain, and supraclavicular nodes did not demonstrate any abnormal lymph nodes or masses. Parotid glands had no masses. Palpation of the thyroid was soft and smooth, with no nodules or goiter appreciated.  The trachea was mobile and midline.  Neurological - Cranial nerves 2 through 12 were grossly intact. House-Brackmann grade 1 out of 6 bilaterally.   Cardiovascular - carotid pulses are 2+ bilaterally, regular rhythm      Assessment and Plan -     ICD-10-CM    1. SNHL (sensory-neural hearing loss), asymmetrical  H90.3 Adult Audiology  Referral     MR Brain w/o & w Contrast      2. Change in hearing of right ear  H91.91 Adult ENT  Referral          Zachary Chand is a 63 year old male who presents to me today with asymmetric sensorineural hearing loss.  His hearing in both ears in the upper tones is poor but with the new significant asymmetry in the right earlobe mid tones he is having substantial issues hearing.  Some of this may be due to noise exposure and age but the etiology of the sudden right-sided loss is uncertain.  I recommend MRI brain to rule out retrocochlear pathology.  Repeat audiogram in 6 months.  I recommend a hearing aid consultation and hearing aids.  He likely will need a BiCROS aid.    Robert Bruce MD  Otolaryngology  [facility]

## 2024-05-10 DIAGNOSIS — E11.65 TYPE 2 DIABETES MELLITUS WITH HYPERGLYCEMIA, WITHOUT LONG-TERM CURRENT USE OF INSULIN (H): ICD-10-CM

## 2024-06-03 DIAGNOSIS — E11.65 TYPE 2 DIABETES MELLITUS WITH HYPERGLYCEMIA, WITHOUT LONG-TERM CURRENT USE OF INSULIN (H): ICD-10-CM

## 2024-07-11 DIAGNOSIS — E11.65 TYPE 2 DIABETES MELLITUS WITH HYPERGLYCEMIA, WITHOUT LONG-TERM CURRENT USE OF INSULIN (H): ICD-10-CM

## 2024-07-11 RX ORDER — GLIPIZIDE 10 MG/1
TABLET, FILM COATED, EXTENDED RELEASE ORAL
Qty: 180 TABLET | Refills: 0 | OUTPATIENT
Start: 2024-07-11

## 2024-08-14 ENCOUNTER — OFFICE VISIT (OUTPATIENT)
Dept: FAMILY MEDICINE | Facility: CLINIC | Age: 64
End: 2024-08-14
Payer: COMMERCIAL

## 2024-08-14 VITALS
TEMPERATURE: 97.8 F | WEIGHT: 228 LBS | DIASTOLIC BLOOD PRESSURE: 68 MMHG | HEART RATE: 97 BPM | RESPIRATION RATE: 18 BRPM | OXYGEN SATURATION: 97 % | HEIGHT: 73 IN | BODY MASS INDEX: 30.22 KG/M2 | SYSTOLIC BLOOD PRESSURE: 118 MMHG

## 2024-08-14 DIAGNOSIS — I73.9 PAD (PERIPHERAL ARTERY DISEASE) (H): ICD-10-CM

## 2024-08-14 DIAGNOSIS — E11.42 DIABETIC POLYNEUROPATHY ASSOCIATED WITH TYPE 2 DIABETES MELLITUS (H): ICD-10-CM

## 2024-08-14 DIAGNOSIS — M54.50 CHRONIC BILATERAL LOW BACK PAIN WITHOUT SCIATICA: ICD-10-CM

## 2024-08-14 DIAGNOSIS — I10 HYPERTENSION GOAL BP (BLOOD PRESSURE) < 140/90: ICD-10-CM

## 2024-08-14 DIAGNOSIS — E11.65 TYPE 2 DIABETES MELLITUS WITH HYPERGLYCEMIA, WITHOUT LONG-TERM CURRENT USE OF INSULIN (H): ICD-10-CM

## 2024-08-14 DIAGNOSIS — J18.9 PNEUMONIA OF RIGHT UPPER LOBE DUE TO INFECTIOUS ORGANISM: Primary | ICD-10-CM

## 2024-08-14 DIAGNOSIS — E11.51 TYPE 2 DIABETES MELLITUS WITH DIABETIC PERIPHERAL ANGIOPATHY WITHOUT GANGRENE, WITHOUT LONG-TERM CURRENT USE OF INSULIN (H): ICD-10-CM

## 2024-08-14 DIAGNOSIS — G89.29 CHRONIC BILATERAL LOW BACK PAIN WITHOUT SCIATICA: ICD-10-CM

## 2024-08-14 DIAGNOSIS — E78.5 HYPERLIPIDEMIA LDL GOAL <70: ICD-10-CM

## 2024-08-14 DIAGNOSIS — E78.5 HYPERLIPIDEMIA LDL GOAL <100: ICD-10-CM

## 2024-08-14 PROCEDURE — 99214 OFFICE O/P EST MOD 30 MIN: CPT | Performed by: PHYSICIAN ASSISTANT

## 2024-08-14 RX ORDER — GLIPIZIDE 10 MG/1
10 TABLET, FILM COATED, EXTENDED RELEASE ORAL DAILY
Qty: 90 TABLET | Refills: 0 | Status: SHIPPED | OUTPATIENT
Start: 2024-08-14

## 2024-08-14 RX ORDER — ROSUVASTATIN CALCIUM 20 MG/1
20 TABLET, COATED ORAL DAILY
Qty: 90 TABLET | Refills: 0 | Status: SHIPPED | OUTPATIENT
Start: 2024-08-14

## 2024-08-14 RX ORDER — METOPROLOL SUCCINATE 25 MG/1
12.5 TABLET, EXTENDED RELEASE ORAL 2 TIMES DAILY
Qty: 90 TABLET | Refills: 0 | Status: SHIPPED | OUTPATIENT
Start: 2024-08-14

## 2024-08-14 RX ORDER — OXYCODONE AND ACETAMINOPHEN 5; 325 MG/1; MG/1
.5-1 TABLET ORAL EVERY 6 HOURS PRN
Qty: 15 TABLET | Refills: 0 | Status: SHIPPED | OUTPATIENT
Start: 2024-08-14

## 2024-08-14 ASSESSMENT — PAIN SCALES - GENERAL: PAINLEVEL: NO PAIN (0)

## 2024-08-14 NOTE — PROGRESS NOTES
Assessment & Plan   Problem List Items Addressed This Visit          Nervous and Auditory    Low back pain    Relevant Medications    oxyCODONE-acetaminophen (PERCOCET) 5-325 MG tablet       Endocrine    Type 2 diabetes mellitus with hyperglycemia, without long-term current use of insulin (H)    Relevant Medications    metFORMIN (GLUCOPHAGE) 1000 MG tablet    glipiZIDE (GLUCOTROL XL) 10 MG 24 hr tablet    Hyperlipidemia LDL goal <100    Relevant Medications    rosuvastatin (CRESTOR) 20 MG tablet    Type 2 diabetes mellitus with diabetic peripheral angiopathy without gangrene, without long-term current use of insulin (H)    Relevant Medications    metFORMIN (GLUCOPHAGE) 1000 MG tablet    glipiZIDE (GLUCOTROL XL) 10 MG 24 hr tablet       Circulatory    Hypertension goal BP (blood pressure) < 140/90    Relevant Medications    metoprolol succinate ER (TOPROL XL) 25 MG 24 hr tablet    PAD (peripheral artery disease) (H24)    Relevant Medications    rosuvastatin (CRESTOR) 20 MG tablet     Other Visit Diagnoses       Pneumonia of right upper lobe due to infectious organism    -  Primary    Hyperlipidemia LDL goal <70        Relevant Medications    rosuvastatin (CRESTOR) 20 MG tablet    Diabetic polyneuropathy associated with type 2 diabetes mellitus (H)        Relevant Medications    metFORMIN (GLUCOPHAGE) 1000 MG tablet    glipiZIDE (GLUCOTROL XL) 10 MG 24 hr tablet    oxyCODONE-acetaminophen (PERCOCET) 5-325 MG tablet           65yo M here for post ER f/u of pneumonia. Feeling improved. Patient had been off of both his Victoza and Jardiance, the latter due to excessive cost.  He was restarted on 10 mg of glipizide in the emergency room and has been tolerating this well.  He does know how he feels when he is hypoglycemic and he has not felt that since starting the medicine.  We will continue him on the glipizide and metformin for now and he agreed to follow-up with his primary care provider in the next 3 months for  "recheck of his A1c which was 9.8 when he was in the ER.  He was instructed to follow-up with his cardiologist and as he does not know whether he needs to be on Pletal, Effient or lisinopril, I will hold on restarting these now as he will be seeing cardiology in the near future anyway. Small prescription for Percocet given after reviewing his PDMP. Uses this for neck and back pain flares.     Complete history and physical exam as below. Afebrile with normal vital signs.    Dx discussed with and explained to the pt to their satisfaction.  All questions were answered at this time. Pt expressed understanding of and agreement with this dx, tx, and plan. No further workup warranted and standard medication warnings given. I have given the patient a list of pertinent indications for re-evaluation. Will go to the Emergency Department if symptoms worsen or new concerning symptoms arise. Patient left in no apparent distress.   [facility]  Prescription drug management  35 minutes spent by me on the date of the encounter doing chart review, history and exam, documentation and further activities per the note   MED REC REQUIRED  Post Medication Reconciliation Status: discharge medications reconciled and changed, per note/orders  BMI  Estimated body mass index is 30.35 kg/m  as calculated from the following:    Height as of this encounter: 6' 0.68\" (1.846 m).    Weight as of this encounter: 228 lb (103.4 kg).     See Patient Instructions      Lisa Sharma is a 64 year old, presenting for the following health issues:  ER F/U        8/14/2024     9:41 AM   Additional Questions   Roomed by Joanna Becerra CMA   Accompanied by N/A         8/14/2024     9:41 AM   Patient Reported Additional Medications   Patient reports taking the following new medications Azithromycin     History of Present Illness       Reason for visit:  Lungs  Symptom onset:  3-7 " "days ago    He eats 0-1 servings of fruits and vegetables daily.He consumes 1 sweetened beverage(s) daily.He exercises with enough effort to increase his heart rate 9 or less minutes per day.  He exercises with enough effort to increase his heart rate 4 days per week. He is missing 1 dose(s) of medications per week.       ED/UC Followup:    Facility:  [facility]  Date of visit: 08/09/2024  Reason for visit: Right sided chest pain/pneumonia  Current Status: Getting better  In ER for right sided chest pain. CT PE study showed RUL CAP. [place] improved 24 hours into cefuroxime/azithromycin course with his last doses today. Tickling dry cough lingering, but other symptoms have resolved. Had COVID 3-4 weeks ago and finished a course of Paxlovid, which greatly improved his symptoms at that time.      BGs have been less than 200 on 10mg glipizide, which was restarted in the ER. Jardiance has been cost prohibitive. He is unsure whether he should still be on Victoza, Effient, Pletal  or lisinopril. I have left these on his med list as he is going to follow up with cardiology in the near future.    Review of Systems  Constitutional, HEENT, cardiovascular, pulmonary, gi and gu systems are negative, except as otherwise noted.      Objective    /68   Pulse 97   Temp 97.8  F (36.6  C) (Temporal)   Resp 18   Ht 1.846 m (6' 0.68\")   Wt 103.4 kg (228 lb)   SpO2 97%   BMI 30.35 kg/m    Body mass index is 30.35 kg/m .  Physical Exam  Vitals and nursing note reviewed.   Constitutional:       General: He is not in acute distress.     Appearance: He is not ill-appearing or diaphoretic.   HENT:      Head: Normocephalic and atraumatic.      Mouth/Throat:      Mouth: Mucous membranes are moist.   Eyes:      Conjunctiva/sclera: Conjunctivae normal.   Cardiovascular:      Rate and Rhythm: Normal rate and regular rhythm.      Heart sounds: Normal heart sounds. No murmur heard.     No friction rub. No gallop.   Pulmonary:      " Effort: Pulmonary effort is normal. No respiratory distress.      Breath sounds: Normal breath sounds. No stridor. No wheezing, rhonchi or rales.   Skin:     General: Skin is warm and dry.   Neurological:      General: No focal deficit present.      Mental Status: He is alert. Mental status is at baseline.   Psychiatric:         Mood and Affect: Mood normal.         Behavior: Behavior normal.               Signed Electronically by: JERMAIN Clancy

## 2024-08-14 NOTE — PATIENT INSTRUCTIONS
Bubba Sharma,    Thank you for allowing [facility] to manage your care.    I'm glad to hear that you are improving on the medications.  I am very sorry to hear about the passing of your girlfriend.    Please take the medications as prescribed and contact your cardiology clinic to get in with them as soon as possible to discuss your medications.    Schedule a follow-up appointment with Michael Murphy in 3 months to monitor your diabetes.    If you develop worsening/changing symptoms at any time, please be seen in clinic/urgent care or call 911/go to the emergency department for evaluation.    I sent your prescriptions to your pharmacy.  Check to see if you are taking lisinopril at home.  If you are already taking this, continue taking it at your prescribed amount.  If you are not taking it, please contact us and we will get you on a low-dose going forward to protect your kidneys.      For your pain, please use Tylenol 650mg every 6 hours. Max acetaminophen (Tylenol) 3,000mg/24 hours    Narcotics Discharge Instructions: (Roxicodone, Dilaudid, Ultram)    You have been prescribed a narcotic pain medication that has risk for addiction with prolonged use, so please use sparingly.  Additionally, narcotics are medications that are sedating (will make you sleepy), so do not drive or operate machinery while taking this medication.  Avoid alcohol or other sedating medicines such as benzodiazepines while taking narcotics due to risk for increased sedation and difficulty breathing.      Narcotics will cause constipation.  If you need to take this medication please consider taking an over-the-counter stool softener and laxative, such as Senna Plus, to prevent constipation from developing.  Nausea is a side effect of narcotic use.  Possible additional side effects include vomiting, itching, and dizziness/lightheadedness.    If you have any questions or concerns, please feel free to call us at  [phone]    Sincerely,    Gregory Olson PA-C    Did you know?      You can schedule a video visit for follow-up appointments as well as future appointments for certain conditions.  Please see the below link.     [email or web address]    If you have not already done so,  I encourage you to sign up for [employer] ([email or web address]  This will allow you to review your results, securely communicate with a provider, and schedule virtual visits as well.

## 2024-08-27 DIAGNOSIS — E11.65 TYPE 2 DIABETES MELLITUS WITH HYPERGLYCEMIA, WITHOUT LONG-TERM CURRENT USE OF INSULIN (H): ICD-10-CM

## 2024-08-27 DIAGNOSIS — G89.29 CHRONIC BILATERAL LOW BACK PAIN WITHOUT SCIATICA: ICD-10-CM

## 2024-08-27 DIAGNOSIS — M54.50 CHRONIC BILATERAL LOW BACK PAIN WITHOUT SCIATICA: ICD-10-CM

## 2024-08-29 RX ORDER — MELOXICAM 15 MG/1
15 TABLET ORAL DAILY
Qty: 30 TABLET | Refills: 0 | Status: SHIPPED | OUTPATIENT
Start: 2024-08-29

## 2024-08-29 NOTE — TELEPHONE ENCOUNTER
08/29/2024 Talked to patient regarding appt. He will call back to reschedule.    Moy Vincent  Registration

## 2024-09-01 ENCOUNTER — HEALTH MAINTENANCE LETTER (OUTPATIENT)
Age: 64
End: 2024-09-01

## 2024-10-01 ENCOUNTER — TELEPHONE (OUTPATIENT)
Dept: FAMILY MEDICINE | Facility: CLINIC | Age: 64
End: 2024-10-01
Payer: COMMERCIAL

## 2024-10-01 NOTE — TELEPHONE ENCOUNTER
Patient Quality Outreach    Patient is due for the following:   Diabetes -  A1C, Eye Exam, and Microalbumin  Colon Cancer Screening    Next Steps:   Schedule a office visit for diabetes recheck    Type of outreach:    Sent [employer] message.      Questions for provider review:    None           Magda Trammell

## 2024-10-28 ENCOUNTER — TELEPHONE (OUTPATIENT)
Dept: FAMILY MEDICINE | Facility: CLINIC | Age: 64
End: 2024-10-28
Payer: COMMERCIAL

## 2024-10-28 NOTE — TELEPHONE ENCOUNTER
Forms received from: [employer]   Phone number listed: [phone]   Fax listed: [phone]  Date received: 10/28/24  Form description: Disability   Once forms are completed, please return to [employer] via fax.  Is patient requesting to be contacted when forms are completed: na  Phone: na  Form placed:  Michael Vences

## 2024-10-30 ENCOUNTER — VIRTUAL VISIT (OUTPATIENT)
Dept: FAMILY MEDICINE | Facility: CLINIC | Age: 64
End: 2024-10-30
Payer: COMMERCIAL

## 2024-10-30 DIAGNOSIS — G89.29 CHRONIC NECK PAIN: ICD-10-CM

## 2024-10-30 DIAGNOSIS — G89.29 CHRONIC BILATERAL LOW BACK PAIN WITHOUT SCIATICA: Primary | ICD-10-CM

## 2024-10-30 DIAGNOSIS — M54.2 CHRONIC NECK PAIN: ICD-10-CM

## 2024-10-30 DIAGNOSIS — E11.42 DIABETIC POLYNEUROPATHY ASSOCIATED WITH TYPE 2 DIABETES MELLITUS (H): ICD-10-CM

## 2024-10-30 DIAGNOSIS — M54.50 CHRONIC BILATERAL LOW BACK PAIN WITHOUT SCIATICA: Primary | ICD-10-CM

## 2024-10-30 PROCEDURE — 99443 PR PHYSICIAN TELEPHONE EVALUATION 21-30 MIN: CPT | Mod: 93 | Performed by: PHYSICIAN ASSISTANT

## 2024-10-30 NOTE — PROGRESS NOTES
Zachary is a 64 year old who is being evaluated via a billable telephone visit.    What phone number would you like to be contacted at? [phone]  How would you like to obtain your AVS? MyChart  Originating Location (pt. Location): Home    Distant Location (provider location):  On-site      Subjective   Zachary is a 64 year old, presenting for the following health issues:  Forms      10/30/2024     9:15 AM   Additional Questions   Roomed by MP         10/30/2024     9:15 AM   Patient Reported Additional Medications   Patient reports taking the following new medications None per patient     HPI   Patient needs forms completed    Chronic low back pain and neck pain and neuropathy. Has been unable to work for years.     Review of Systems  Constitutional, HEENT, cardiovascular, pulmonary, GI, , musculoskeletal, neuro, skin, endocrine and psych systems are negative, except as otherwise noted.      Objective           Vitals:  No vitals were obtained today due to virtual visit.    Physical Exam   General: Alert and no distress //Respiratory: No audible wheeze, cough, or shortness of breath // Psychiatric:  Appropriate affect, tone, and pace of words      Zachary was seen today for forms.    Diagnoses and all orders for this visit:    Chronic bilateral low back pain without sciatica    Diabetic polyneuropathy associated with type 2 diabetes mellitus (H)    Chronic neck pain      Paperwork completed.       Phone call duration: 22 minutes  Signed Electronically by: Michael Murphy PA-C

## 2024-10-31 DIAGNOSIS — F51.04 PSYCHOPHYSIOLOGICAL INSOMNIA: ICD-10-CM

## 2024-10-31 RX ORDER — TRAZODONE HYDROCHLORIDE 50 MG/1
TABLET, FILM COATED ORAL
Qty: 30 TABLET | Refills: 1 | Status: SHIPPED | OUTPATIENT
Start: 2024-10-31

## 2024-11-26 ENCOUNTER — TELEPHONE (OUTPATIENT)
Dept: FAMILY MEDICINE | Facility: CLINIC | Age: 64
End: 2024-11-26
Payer: COMMERCIAL

## 2024-11-26 NOTE — TELEPHONE ENCOUNTER
Called patient.  He did establish care with [facility] in [place].    Advised to call them for refills, as he had an appointment with them already.    Patient stated understanding and agreeable with the plan of care.     Clare HE RN  Triage Nurse  [facility]

## 2024-11-26 NOTE — TELEPHONE ENCOUNTER
Called patient he is living up in [place] he would like his medication refilled until he sets up an appt with a doctor up in [place] Please Send prescription to [facility] in [place] , He is not able to do an appt with Michael at this time

## 2024-12-03 ENCOUNTER — PATIENT OUTREACH (OUTPATIENT)
Dept: CARE COORDINATION | Facility: CLINIC | Age: 64
End: 2024-12-03
Payer: COMMERCIAL

## 2024-12-17 ENCOUNTER — PATIENT OUTREACH (OUTPATIENT)
Dept: CARE COORDINATION | Facility: CLINIC | Age: 64
End: 2024-12-17
Payer: COMMERCIAL

## 2025-01-04 ENCOUNTER — HEALTH MAINTENANCE LETTER (OUTPATIENT)
Age: 65
End: 2025-01-04

## 2025-01-06 ENCOUNTER — TRANSFERRED RECORDS (OUTPATIENT)
Dept: HEALTH INFORMATION MANAGEMENT | Facility: CLINIC | Age: 65
End: 2025-01-06
Payer: COMMERCIAL

## 2025-01-07 ENCOUNTER — TELEPHONE (OUTPATIENT)
Dept: OTOLARYNGOLOGY | Facility: CLINIC | Age: 65
End: 2025-01-07
Payer: COMMERCIAL

## 2025-01-07 NOTE — TELEPHONE ENCOUNTER
I spoke with Case. MRI brain shows no retrocochlear pathology. A small mastoid effusion right side, but he has a significant sensorineural hearing loss and not conductive loss. I recommend a BiCROS aid.

## 2025-02-02 ENCOUNTER — HEALTH MAINTENANCE LETTER (OUTPATIENT)
Age: 65
End: 2025-02-02

## 2025-04-19 ENCOUNTER — HEALTH MAINTENANCE LETTER (OUTPATIENT)
Age: 65
End: 2025-04-19

## 2025-04-24 ENCOUNTER — LAB REQUISITION (OUTPATIENT)
Dept: LAB | Facility: CLINIC | Age: 65
End: 2025-04-24
Payer: COMMERCIAL

## 2025-04-24 DIAGNOSIS — D48.9 NEOPLASM OF UNCERTAIN BEHAVIOR, UNSPECIFIED: ICD-10-CM

## 2025-04-24 PROCEDURE — 88305 TISSUE EXAM BY PATHOLOGIST: CPT | Mod: 26 | Performed by: DERMATOLOGY

## 2025-04-24 PROCEDURE — 88305 TISSUE EXAM BY PATHOLOGIST: CPT | Mod: TC,ORL | Performed by: DERMATOLOGY

## 2025-04-29 LAB
PATH REPORT.COMMENTS IMP SPEC: NORMAL
PATH REPORT.COMMENTS IMP SPEC: NORMAL
PATH REPORT.FINAL DX SPEC: NORMAL
PATH REPORT.GROSS SPEC: NORMAL
PATH REPORT.MICROSCOPIC SPEC OTHER STN: NORMAL
PATH REPORT.RELEVANT HX SPEC: NORMAL

## 2025-05-10 ENCOUNTER — HEALTH MAINTENANCE LETTER (OUTPATIENT)
Age: 65
End: 2025-05-10

## 2025-08-02 ENCOUNTER — HEALTH MAINTENANCE LETTER (OUTPATIENT)
Age: 65
End: 2025-08-02